# Patient Record
Sex: FEMALE | Race: BLACK OR AFRICAN AMERICAN | NOT HISPANIC OR LATINO | Employment: UNEMPLOYED | URBAN - METROPOLITAN AREA
[De-identification: names, ages, dates, MRNs, and addresses within clinical notes are randomized per-mention and may not be internally consistent; named-entity substitution may affect disease eponyms.]

---
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
## 2020-01-09 ENCOUNTER — ROUTINE PRENATAL (OUTPATIENT)
Dept: OBGYN | Facility: CLINIC | Age: 20
End: 2020-01-09
Payer: MEDICAID

## 2020-01-09 VITALS — DIASTOLIC BLOOD PRESSURE: 90 MMHG | WEIGHT: 154 LBS | SYSTOLIC BLOOD PRESSURE: 128 MMHG | BODY MASS INDEX: 28.17 KG/M2

## 2020-01-09 DIAGNOSIS — O09.93 ENCOUNTER FOR SUPERVISION OF HIGH RISK PREGNANCY IN THIRD TRIMESTER, ANTEPARTUM: Primary | ICD-10-CM

## 2020-01-09 PROCEDURE — 90040 PR PRENATAL FOLLOW UP: CPT | Performed by: NURSE PRACTITIONER

## 2020-01-09 NOTE — PROGRESS NOTES
Pt here today for OB follow up  Reports +FM  WT: 154 lb  BP: 128/90  Pt states no complaints or concerns today  Declines Tdap vaccine  KATIE sheet reprinted and explained today  Good # [phone] or [phone] (pt's mom # Asia Meredith)

## 2020-01-09 NOTE — PROGRESS NOTES
S:  Pt is  at 34w1d for routine OB follow up.  No concerns today. Pt has never gotten growth scan done, will schedule after this visit.  Reports good FM.  Denies VB, LOF, RUCs or vaginal DC.    O:  Please see above vitals.        FHTs: 145        Fundal ht: 32 cm.        S<D        UDS: positive for marijuana    A:  IUP at 34w1d  Patient Active Problem List    Diagnosis Date Noted   • Encounter for supervision of normal pregnancy in second trimester 10/04/2019     Priority: High   • Excessive weight gain during pregnancy in second trimester 2019     Priority: Medium        P:  1.  GBS @ 36 wks.          2.  Continue FKCs.          3.  Questions answered.          4.  Encouraged pt to tour L&D.          5.  Encourage adequate water intake.        6.  F/u 2 wks with Dr. Sofiya Lima, Dr. Weems or Dr. Sahni for next OB visit         7.  Growth scan ordered        8. Declines TDAP

## 2020-01-21 ENCOUNTER — HOSPITAL ENCOUNTER (INPATIENT)
Facility: MEDICAL CENTER | Age: 20
LOS: 5 days | End: 2020-01-26
Attending: OBSTETRICS & GYNECOLOGY | Admitting: OBSTETRICS & GYNECOLOGY
Payer: MEDICAID

## 2020-01-21 DIAGNOSIS — Z98.891 S/P C-SECTION: ICD-10-CM

## 2020-01-21 LAB
ALBUMIN SERPL BCP-MCNC: 3.7 G/DL (ref 3.2–4.9)
ALBUMIN/GLOB SERPL: 1 G/DL
ALP SERPL-CCNC: 105 U/L (ref 30–99)
ALT SERPL-CCNC: 9 U/L (ref 2–50)
ANION GAP SERPL CALC-SCNC: 12 MMOL/L (ref 0–11.9)
APPEARANCE UR: CLEAR
AST SERPL-CCNC: 14 U/L (ref 12–45)
BASOPHILS # BLD AUTO: 0.1 % (ref 0–1.8)
BASOPHILS # BLD: 0.01 K/UL (ref 0–0.12)
BILIRUB SERPL-MCNC: 0.3 MG/DL (ref 0.1–1.5)
BUN SERPL-MCNC: 8 MG/DL (ref 8–22)
CALCIUM SERPL-MCNC: 9 MG/DL (ref 8.5–10.5)
CHLORIDE SERPL-SCNC: 101 MMOL/L (ref 96–112)
CO2 SERPL-SCNC: 22 MMOL/L (ref 20–33)
COLOR UR AUTO: YELLOW
CREAT SERPL-MCNC: 0.87 MG/DL (ref 0.5–1.4)
CREAT UR-MCNC: 101.7 MG/DL
EOSINOPHIL # BLD AUTO: 0.01 K/UL (ref 0–0.51)
EOSINOPHIL NFR BLD: 0.1 % (ref 0–6.9)
ERYTHROCYTE [DISTWIDTH] IN BLOOD BY AUTOMATED COUNT: 41.7 FL (ref 35.9–50)
GLOBULIN SER CALC-MCNC: 3.6 G/DL (ref 1.9–3.5)
GLUCOSE SERPL-MCNC: 86 MG/DL (ref 65–99)
GLUCOSE UR QL STRIP.AUTO: NEGATIVE MG/DL
HCT VFR BLD AUTO: 36.6 % (ref 37–47)
HGB BLD-MCNC: 11.8 G/DL (ref 12–16)
IMM GRANULOCYTES # BLD AUTO: 0.08 K/UL (ref 0–0.11)
IMM GRANULOCYTES NFR BLD AUTO: 1.1 % (ref 0–0.9)
KETONES UR QL STRIP.AUTO: >=160 MG/DL
LEUKOCYTE ESTERASE UR QL STRIP.AUTO: ABNORMAL
LYMPHOCYTES # BLD AUTO: 1.22 K/UL (ref 1–4.8)
LYMPHOCYTES NFR BLD: 17.4 % (ref 22–41)
MAGNESIUM SERPL-MCNC: 5.5 MG/DL (ref 1.5–2.5)
MAGNESIUM SERPL-MCNC: 6.8 MG/DL (ref 1.5–2.5)
MCH RBC QN AUTO: 28 PG (ref 27–33)
MCHC RBC AUTO-ENTMCNC: 32.2 G/DL (ref 33.6–35)
MCV RBC AUTO: 86.9 FL (ref 81.4–97.8)
MONOCYTES # BLD AUTO: 0.45 K/UL (ref 0–0.85)
MONOCYTES NFR BLD AUTO: 6.4 % (ref 0–13.4)
NEUTROPHILS # BLD AUTO: 5.26 K/UL (ref 2–7.15)
NEUTROPHILS NFR BLD: 74.9 % (ref 44–72)
NITRITE UR QL STRIP.AUTO: NEGATIVE
NRBC # BLD AUTO: 0 K/UL
NRBC BLD-RTO: 0 /100 WBC
PH UR STRIP.AUTO: 6.5 [PH] (ref 5–8)
PLATELET # BLD AUTO: 155 K/UL (ref 164–446)
PMV BLD AUTO: 11.2 FL (ref 9–12.9)
POTASSIUM SERPL-SCNC: 3.4 MMOL/L (ref 3.6–5.5)
PROT SERPL-MCNC: 7.3 G/DL (ref 6–8.2)
PROT UR QL STRIP: 30 MG/DL
PROT UR-MCNC: 35 MG/DL (ref 0–15)
PROT/CREAT UR: 344 MG/G (ref 10–107)
RBC # BLD AUTO: 4.21 M/UL (ref 4.2–5.4)
RBC UR QL AUTO: NEGATIVE
SODIUM SERPL-SCNC: 135 MMOL/L (ref 135–145)
SP GR UR: 1.01 (ref 1–1.03)
URATE SERPL-MCNC: 6.6 MG/DL (ref 1.9–8.2)
WBC # BLD AUTO: 7 K/UL (ref 4.8–10.8)

## 2020-01-21 PROCEDURE — 81002 URINALYSIS NONAUTO W/O SCOPE: CPT

## 2020-01-21 PROCEDURE — 700102 HCHG RX REV CODE 250 W/ 637 OVERRIDE(OP): Performed by: OBSTETRICS & GYNECOLOGY

## 2020-01-21 PROCEDURE — 700102 HCHG RX REV CODE 250 W/ 637 OVERRIDE(OP): Performed by: PHYSICIAN ASSISTANT

## 2020-01-21 PROCEDURE — 700105 HCHG RX REV CODE 258: Performed by: OBSTETRICS & GYNECOLOGY

## 2020-01-21 PROCEDURE — 84156 ASSAY OF PROTEIN URINE: CPT

## 2020-01-21 PROCEDURE — 85025 COMPLETE CBC W/AUTO DIFF WBC: CPT

## 2020-01-21 PROCEDURE — A9270 NON-COVERED ITEM OR SERVICE: HCPCS | Performed by: OBSTETRICS & GYNECOLOGY

## 2020-01-21 PROCEDURE — 36415 COLL VENOUS BLD VENIPUNCTURE: CPT

## 2020-01-21 PROCEDURE — 3E0P7VZ INTRODUCTION OF HORMONE INTO FEMALE REPRODUCTIVE, VIA NATURAL OR ARTIFICIAL OPENING: ICD-10-PCS | Performed by: OBSTETRICS & GYNECOLOGY

## 2020-01-21 PROCEDURE — 96372 THER/PROPH/DIAG INJ SC/IM: CPT

## 2020-01-21 PROCEDURE — A9270 NON-COVERED ITEM OR SERVICE: HCPCS | Performed by: PHYSICIAN ASSISTANT

## 2020-01-21 PROCEDURE — 700111 HCHG RX REV CODE 636 W/ 250 OVERRIDE (IP): Performed by: OBSTETRICS & GYNECOLOGY

## 2020-01-21 PROCEDURE — 82570 ASSAY OF URINE CREATININE: CPT

## 2020-01-21 PROCEDURE — 700111 HCHG RX REV CODE 636 W/ 250 OVERRIDE (IP)

## 2020-01-21 PROCEDURE — 770002 HCHG ROOM/CARE - OB PRIVATE (112)

## 2020-01-21 PROCEDURE — 80053 COMPREHEN METABOLIC PANEL: CPT

## 2020-01-21 PROCEDURE — 84550 ASSAY OF BLOOD/URIC ACID: CPT

## 2020-01-21 PROCEDURE — 700101 HCHG RX REV CODE 250: Performed by: OBSTETRICS & GYNECOLOGY

## 2020-01-21 PROCEDURE — 700111 HCHG RX REV CODE 636 W/ 250 OVERRIDE (IP): Performed by: NURSE PRACTITIONER

## 2020-01-21 PROCEDURE — 83735 ASSAY OF MAGNESIUM: CPT

## 2020-01-21 RX ORDER — LABETALOL HYDROCHLORIDE 5 MG/ML
20-80 INJECTION, SOLUTION INTRAVENOUS PRN
Status: DISCONTINUED | OUTPATIENT
Start: 2020-01-21 | End: 2020-01-26

## 2020-01-21 RX ORDER — SODIUM CHLORIDE, SODIUM LACTATE, POTASSIUM CHLORIDE, CALCIUM CHLORIDE 600; 310; 30; 20 MG/100ML; MG/100ML; MG/100ML; MG/100ML
INJECTION, SOLUTION INTRAVENOUS CONTINUOUS
Status: DISCONTINUED | OUTPATIENT
Start: 2020-01-21 | End: 2020-01-26

## 2020-01-21 RX ORDER — MAGNESIUM SULFATE HEPTAHYDRATE 40 MG/ML
2 INJECTION, SOLUTION INTRAVENOUS ONCE
Status: COMPLETED | OUTPATIENT
Start: 2020-01-21 | End: 2020-01-21

## 2020-01-21 RX ORDER — ONDANSETRON 4 MG/1
4 TABLET, ORALLY DISINTEGRATING ORAL ONCE
Status: COMPLETED | OUTPATIENT
Start: 2020-01-21 | End: 2020-01-21

## 2020-01-21 RX ORDER — DEXTROSE, SODIUM CHLORIDE, SODIUM LACTATE, POTASSIUM CHLORIDE, AND CALCIUM CHLORIDE 5; .6; .31; .03; .02 G/100ML; G/100ML; G/100ML; G/100ML; G/100ML
INJECTION, SOLUTION INTRAVENOUS CONTINUOUS
Status: DISCONTINUED | OUTPATIENT
Start: 2020-01-21 | End: 2020-01-24

## 2020-01-21 RX ORDER — HYDRALAZINE HYDROCHLORIDE 20 MG/ML
5-10 INJECTION INTRAMUSCULAR; INTRAVENOUS PRN
Status: DISCONTINUED | OUTPATIENT
Start: 2020-01-21 | End: 2020-01-26

## 2020-01-21 RX ORDER — CALCIUM GLUCONATE 94 MG/ML
1 INJECTION, SOLUTION INTRAVENOUS
Status: DISCONTINUED | OUTPATIENT
Start: 2020-01-21 | End: 2020-01-26

## 2020-01-21 RX ORDER — ACETAMINOPHEN 325 MG/1
650 TABLET ORAL EVERY 6 HOURS PRN
Status: DISCONTINUED | OUTPATIENT
Start: 2020-01-21 | End: 2020-01-23

## 2020-01-21 RX ORDER — MAGNESIUM SULFATE HEPTAHYDRATE 40 MG/ML
INJECTION, SOLUTION INTRAVENOUS
Status: COMPLETED
Start: 2020-01-21 | End: 2020-01-21

## 2020-01-21 RX ORDER — ONDANSETRON 2 MG/ML
4 INJECTION INTRAMUSCULAR; INTRAVENOUS EVERY 6 HOURS PRN
Status: DISCONTINUED | OUTPATIENT
Start: 2020-01-21 | End: 2020-01-26

## 2020-01-21 RX ORDER — MAGNESIUM SULFATE HEPTAHYDRATE 40 MG/ML
4 INJECTION, SOLUTION INTRAVENOUS ONCE
Status: COMPLETED | OUTPATIENT
Start: 2020-01-21 | End: 2020-01-21

## 2020-01-21 RX ORDER — BETAMETHASONE SODIUM PHOSPHATE AND BETAMETHASONE ACETATE 3; 3 MG/ML; MG/ML
12 INJECTION, SUSPENSION INTRA-ARTICULAR; INTRALESIONAL; INTRAMUSCULAR; SOFT TISSUE EVERY 24 HOURS
Status: COMPLETED | OUTPATIENT
Start: 2020-01-21 | End: 2020-01-22

## 2020-01-21 RX ORDER — MAGNESIUM SULFATE HEPTAHYDRATE 40 MG/ML
1.5 INJECTION, SOLUTION INTRAVENOUS CONTINUOUS
Status: DISCONTINUED | OUTPATIENT
Start: 2020-01-21 | End: 2020-01-23

## 2020-01-21 RX ADMIN — ONDANSETRON 4 MG: 4 TABLET, ORALLY DISINTEGRATING ORAL at 10:56

## 2020-01-21 RX ADMIN — DINOPROSTONE 0.5 MG: 0.5 GEL VAGINAL at 14:52

## 2020-01-21 RX ADMIN — MAGNESIUM SULFATE HEPTAHYDRATE 2 G: 40 INJECTION, SOLUTION INTRAVENOUS at 12:08

## 2020-01-21 RX ADMIN — BETAMETHASONE SODIUM PHOSPHATE AND BETAMETHASONE ACETATE 12 MG: 3; 3 INJECTION, SUSPENSION INTRA-ARTICULAR; INTRALESIONAL; INTRAMUSCULAR at 12:45

## 2020-01-21 RX ADMIN — LABETALOL HYDROCHLORIDE 20 MG: 5 INJECTION, SOLUTION INTRAVENOUS at 23:31

## 2020-01-21 RX ADMIN — LABETALOL HYDROCHLORIDE 20 MG: 5 INJECTION, SOLUTION INTRAVENOUS at 11:57

## 2020-01-21 RX ADMIN — ONDANSETRON 4 MG: 2 INJECTION INTRAMUSCULAR; INTRAVENOUS at 18:58

## 2020-01-21 RX ADMIN — SODIUM CHLORIDE 2.5 MILLION UNITS: 9 INJECTION, SOLUTION INTRAVENOUS at 16:00

## 2020-01-21 RX ADMIN — SODIUM CHLORIDE 5 MILLION UNITS: 900 INJECTION INTRAVENOUS at 12:10

## 2020-01-21 RX ADMIN — ACETAMINOPHEN 650 MG: 325 TABLET, FILM COATED ORAL at 18:58

## 2020-01-21 RX ADMIN — MAGNESIUM SULFATE HEPTAHYDRATE 2 G/HR: 40 INJECTION, SOLUTION INTRAVENOUS at 12:43

## 2020-01-21 RX ADMIN — MISOPROSTOL 25 MCG: 100 TABLET ORAL at 21:00

## 2020-01-21 RX ADMIN — SODIUM CHLORIDE, POTASSIUM CHLORIDE, SODIUM LACTATE AND CALCIUM CHLORIDE: 600; 310; 30; 20 INJECTION, SOLUTION INTRAVENOUS at 12:06

## 2020-01-21 RX ADMIN — SODIUM CHLORIDE 2.5 MILLION UNITS: 9 INJECTION, SOLUTION INTRAVENOUS at 19:57

## 2020-01-21 RX ADMIN — MAGNESIUM SULFATE HEPTAHYDRATE 4 G: 40 INJECTION, SOLUTION INTRAVENOUS at 12:18

## 2020-01-21 ASSESSMENT — LIFESTYLE VARIABLES
CONSUMPTION TOTAL: NEGATIVE
ON A TYPICAL DAY WHEN YOU DRINK ALCOHOL HOW MANY DRINKS DO YOU HAVE: 0
AVERAGE NUMBER OF DAYS PER WEEK YOU HAVE A DRINK CONTAINING ALCOHOL: 0
EVER FELT BAD OR GUILTY ABOUT YOUR DRINKING: NO
EVER HAD A DRINK FIRST THING IN THE MORNING TO STEADY YOUR NERVES TO GET RID OF A HANGOVER: NO
HAVE YOU EVER FELT YOU SHOULD CUT DOWN ON YOUR DRINKING: NO
HOW MANY TIMES IN THE PAST YEAR HAVE YOU HAD 5 OR MORE DRINKS IN A DAY: 0
ALCOHOL_USE: NO
EVER_SMOKED: NEVER
TOTAL SCORE: 0
TOTAL SCORE: 0
HAVE PEOPLE ANNOYED YOU BY CRITICIZING YOUR DRINKING: NO
DOES PATIENT WANT TO STOP DRINKING: NO
TOTAL SCORE: 0

## 2020-01-21 ASSESSMENT — COPD QUESTIONNAIRES
COPD SCREENING SCORE: 0
HAVE YOU SMOKED AT LEAST 100 CIGARETTES IN YOUR ENTIRE LIFE: NO/DON'T KNOW
IN THE PAST 12 MONTHS DO YOU DO LESS THAN YOU USED TO BECAUSE OF YOUR BREATHING PROBLEMS: DISAGREE/UNSURE
DO YOU EVER COUGH UP ANY MUCUS OR PHLEGM?: NO/ONLY WITH OCCASIONAL COLDS OR INFECTIONS
DURING THE PAST 4 WEEKS HOW MUCH DID YOU FEEL SHORT OF BREATH: NONE/LITTLE OF THE TIME

## 2020-01-21 ASSESSMENT — PATIENT HEALTH QUESTIONNAIRE - PHQ9
1. LITTLE INTEREST OR PLEASURE IN DOING THINGS: NOT AT ALL
2. FEELING DOWN, DEPRESSED, IRRITABLE, OR HOPELESS: NOT AT ALL
SUM OF ALL RESPONSES TO PHQ9 QUESTIONS 1 AND 2: 0

## 2020-01-21 NOTE — PROGRESS NOTES
1020-pt presents from home with c/o n/v for a few days and round ligament pain, no c/o leaking, bleeding, pain or uc's, states baby is moving normally, placed on external monitors, vs taken and set for q 10 min due to elevation, ua dipped with 1+ protein, pt states that she has had a headache since yesterday, denies visual changes and RUQ pain, reflexes brisk, no clonus no swelling,   1040-TC KARISSA Massey CNM, report given, [facility] labs ordered  1050-labs drawn and sent  1135-TC Dr Weems, pressures elevated again, will be in to see pt  1140-Dr Weems in room, discussed admission for delivery with pt, vtx by US  1150-another elevated pressure, labetalol iv and hypertensive protocol ordered   1157-labetalol given  1200-report given to WILLIAN Seaman RN, POC discussed, transferred to LND for IOL and magnesium start

## 2020-01-21 NOTE — PROGRESS NOTES
Pt examined for prostaglandin gel    SSE: closed cervix. Prostaglandin gel deployed without difficulty.   SVE deferred as nurse's previous check was closed and thick  Bps are 140s/ 80-90s on Mg drip    FHT: 145 / mod sandi / reactive  John Sevier: q 5min    Plan  Recheck in 6hrs.

## 2020-01-21 NOTE — H&P
History and Physical    Mare Eason is a 19 y.o. female  at 35w6d who presents for nausea and vomiting. She has been having nausea for her entire third trimester and has been taking zofran. She ran out and her appt is not until tomorrow. She vomited 2 times today. She also has been having suprapubic pain and round ligament discomfort.   Today she admits to a headache.   First BP on admission was 173/105, 170/94, 140/94, 174/107, and 173/103. Patient denies hx elevated Bps.     Subjective:   CTXs: negative   Pain: negative  LOF: negative  Vaginal bleeding: negative   Fetal movement: positive    ROS: Pertinent positives documented in HPI and all other systems reviewed & are negative    OB History    Para Term  AB Living   1             SAB TAB Ectopic Molar Multiple Live Births                    # Outcome Date GA Lbr Adán/2nd Weight Sex Delivery Anes PTL Lv   1 Current                Past Medical History:   Diagnosis Date   • Head ache    • Physiological ovarian cysts 2015       No past surgical history on file.      Current Facility-Administered Medications:   •  betamethasone acetate-betamethasone sodium phosphate (CELESTONE) injection 12 mg, 12 mg, Intramuscular, Q24HR, Nabeel Dank, D.O.  •  labetalol (NORMODYNE/TRANDATE) injection 20-80 mg, 20-80 mg, Intravenous, PRN **OR** hydrALAZINE (APRESOLINE) injection 5-10 mg, 5-10 mg, Intravenous, PRN, Nabeel Dank, D.O.  •  lactated ringers infusion, , Intravenous, Continuous, Nabeel Lirianost, D.O.  •  ondansetron (ZOFRAN) syringe/vial injection 4 mg, 4 mg, Intravenous, Q6HRS PRN, Nabeel Dank, D.O.  •  magnesium sulfate IVPB premix 4 g, 4 g, Intravenous, Once, Nabeel Dank, D.O.  •  magnesium sulfate IVPB premix 2 g, 2 g, Intravenous, Once, Nabeel Lirianost, D.O.  •  magnesium sulfate 40 g/1000mL infusion, 2 g/hr, Intravenous, Continuous, Nabeel Dank, D.O.  •  calcium GLUConate injection 1 g, 1 g, Intravenous, Once PRN, Nabeel Lirianost,  D.O.  •  penicillin G potassium 5 Million Units in  mL IVPB, 5 Million Units, Intravenous, Once **FOLLOWED BY** penicillin G potassium 2.5 Million Units in  mL IVPB, 2.5 Million Units, Intravenous, Q4HRS, Nabeel Weems D.O.    Allergies: Patient has no known allergies.    Social History     Socioeconomic History   • Marital status: Single     Spouse name: Not on file   • Number of children: Not on file   • Years of education: Not on file   • Highest education level: Not on file   Occupational History   • Not on file   Social Needs   • Financial resource strain: Not on file   • Food insecurity:     Worry: Not on file     Inability: Not on file   • Transportation needs:     Medical: Not on file     Non-medical: Not on file   Tobacco Use   • Smoking status: Never Smoker   • Smokeless tobacco: Never Used   Substance and Sexual Activity   • Alcohol use: No   • Drug use: Yes     Types: Marijuana   • Sexual activity: Not Currently     Partners: Male     Comment: None    Lifestyle   • Physical activity:     Days per week: Not on file     Minutes per session: Not on file   • Stress: Not on file   Relationships   • Social connections:     Talks on phone: Not on file     Gets together: Not on file     Attends Church service: Not on file     Active member of club or organization: Not on file     Attends meetings of clubs or organizations: Not on file     Relationship status: Not on file   • Intimate partner violence:     Fear of current or ex partner: Not on file     Emotionally abused: Not on file     Physically abused: Not on file     Forced sexual activity: Not on file   Other Topics Concern   • Not on file   Social History Narrative   • Not on file     FamHx:   Congenital anomalies denies  Chromosomal disorders denies  Inherited MR denies  Blood dyscrasias denies    Prenatal care with [facility] with following problems:  Patient Active Problem List    Diagnosis Date Noted   • Encounter for supervision of normal  "pregnancy in second trimester 10/04/2019     Priority: High   • Excessive weight gain during pregnancy in second trimester 11/14/2019     Priority: Medium     Objective:      BP (!) 174/107   Pulse 93   Temp 36.8 °C (98.3 °F) (Temporal)   Resp 16   Ht 1.575 m (5' 2.01\")   Wt 69.9 kg (154 lb)   SpO2 98%     General:   no acute distress, alert   Skin:   normal   HEENT:  PERRLA   Lungs:   CTA bilateral   Heart:   chest is clear without rales or wheezing, no pedal edema   Abdomen:   soft, gravid, NT   EFW:  2800gr   Pelvis:  adequate with gynecoid pelvis   FHT:  150 BPM  Accels yes  Decels no  Variability moderate  Category I   Uterine Size: S=D   Presentations: Cephalic on BS us today   Cervix:  deferred to LD     Lab Review  Lab:   Blood type: O+     Recent Results (from the past 5880 hour(s))   POCT Pregnancy    Collection Time: 06/28/19  1:56 PM   Result Value Ref Range    POC Urine Pregnancy Test Positive Negative    Internal Control Positive Positive     Internal Control Negative Negative    POCT Urinalysis    Collection Time: 10/04/19  2:40 PM   Result Value Ref Range    POC Color      POC Appearance      POC Leukocyte Esterase small Negative    POC Nitrites negative Negative    POC Urobiligen      POC Protein negative Negative mg/dL    POC Urine PH 5.5 5.0 - 8.0    POC Blood negative Negative    POC Specific Gravity <=1.005 <1.005 - >1.030    POC Ketones negative Negative mg/dL    POC Bilirubin      POC Glucose negative Negative mg/dL   CBC WITH DIFFERENTIAL    Collection Time: 11/26/19 11:40 AM   Result Value Ref Range    WBC 9.1 4.8 - 10.8 K/uL    RBC 3.92 (L) 4.20 - 5.40 M/uL    Hemoglobin 11.4 (L) 12.0 - 16.0 g/dL    Hematocrit 35.0 (L) 37.0 - 47.0 %    MCV 89.3 81.4 - 97.8 fL    MCH 29.1 27.0 - 33.0 pg    MCHC 32.6 (L) 33.6 - 35.0 g/dL    RDW 41.6 35.9 - 50.0 fL    Platelet Count 160 (L) 164 - 446 K/uL    MPV 11.1 9.0 - 12.9 fL    Neutrophils-Polys 77.70 (H) 44.00 - 72.00 %    Lymphocytes 14.40 (L) " 22.00 - 41.00 %    Monocytes 4.90 0.00 - 13.40 %    Eosinophils 0.20 0.00 - 6.90 %    Basophils 0.30 0.00 - 1.80 %    Immature Granulocytes 2.50 (H) 0.00 - 0.90 %    Nucleated RBC 0.00 /100 WBC    Neutrophils (Absolute) 7.03 2.00 - 7.15 K/uL    Lymphs (Absolute) 1.30 1.00 - 4.80 K/uL    Monos (Absolute) 0.44 0.00 - 0.85 K/uL    Eos (Absolute) 0.02 0.00 - 0.51 K/uL    Baso (Absolute) 0.03 0.00 - 0.12 K/uL    Immature Granulocytes (abs) 0.23 (H) 0.00 - 0.11 K/uL    NRBC (Absolute) 0.00 K/uL   Basic Metabolic Panel    Collection Time: 11/26/19 11:40 AM   Result Value Ref Range    Sodium 137 135 - 145 mmol/L    Potassium 3.7 3.6 - 5.5 mmol/L    Chloride 104 96 - 112 mmol/L    Co2 24 20 - 33 mmol/L    Glucose 96 65 - 99 mg/dL    Bun 11 8 - 22 mg/dL    Creatinine 0.76 0.50 - 1.40 mg/dL    Calcium 9.1 8.5 - 10.5 mg/dL    Anion Gap 9.0 0.0 - 11.9   ESTIMATED GFR    Collection Time: 11/26/19 11:40 AM   Result Value Ref Range    GFR If African American >60 >60 mL/min/1.73 m 2    GFR If Non African American >60 >60 mL/min/1.73 m 2   PERIPHERAL SMEAR REVIEW    Collection Time: 11/26/19 11:40 AM   Result Value Ref Range    Peripheral Smear Review see below    URINALYSIS,CULTURE IF INDICATED    Collection Time: 11/26/19 12:46 PM   Result Value Ref Range    Color Yellow     Character Clear     Specific Gravity 1.011 <1.035    Ph 7.0 5.0 - 8.0    Glucose 500 (A) Negative mg/dL    Ketones Negative Negative mg/dL    Protein Negative Negative mg/dL    Bilirubin Negative Negative    Urobilinogen, Urine 0.2 Negative    Nitrite Negative Negative    Leukocyte Esterase Negative Negative    Occult Blood Negative Negative    Micro Urine Req see below    COMP METABOLIC PANEL    Collection Time: 12/15/19  7:45 AM   Result Value Ref Range    Sodium 136 135 - 145 mmol/L    Potassium 3.2 (L) 3.6 - 5.5 mmol/L    Chloride 107 96 - 112 mmol/L    Co2 21 20 - 33 mmol/L    Anion Gap 8.0 0.0 - 11.9    Glucose 104 (H) 65 - 99 mg/dL    Bun 6 (L) 8 - 22  mg/dL    Creatinine 0.80 0.50 - 1.40 mg/dL    Calcium 9.0 8.5 - 10.5 mg/dL    AST(SGOT) 19 12 - 45 U/L    ALT(SGPT) 15 2 - 50 U/L    Alkaline Phosphatase 79 30 - 99 U/L    Total Bilirubin 0.2 0.1 - 1.5 mg/dL    Albumin 3.7 3.2 - 4.9 g/dL    Total Protein 7.1 6.0 - 8.2 g/dL    Globulin 3.4 1.9 - 3.5 g/dL    A-G Ratio 1.1 g/dL   CBC WITH DIFFERENTIAL    Collection Time: 12/15/19  7:45 AM   Result Value Ref Range    WBC 5.3 4.8 - 10.8 K/uL    RBC 3.96 (L) 4.20 - 5.40 M/uL    Hemoglobin 11.3 (L) 12.0 - 16.0 g/dL    Hematocrit 34.5 (L) 37.0 - 47.0 %    MCV 87.1 81.4 - 97.8 fL    MCH 28.5 27.0 - 33.0 pg    MCHC 32.8 (L) 33.6 - 35.0 g/dL    RDW 40.8 35.9 - 50.0 fL    Platelet Count 144 (L) 164 - 446 K/uL    MPV 10.7 9.0 - 12.9 fL    Neutrophils-Polys 74.90 (H) 44.00 - 72.00 %    Lymphocytes 14.80 (L) 22.00 - 41.00 %    Monocytes 7.80 0.00 - 13.40 %    Eosinophils 0.40 0.00 - 6.90 %    Basophils 0.20 0.00 - 1.80 %    Immature Granulocytes 1.90 (H) 0.00 - 0.90 %    Nucleated RBC 0.00 /100 WBC    Neutrophils (Absolute) 3.95 2.00 - 7.15 K/uL    Lymphs (Absolute) 0.78 (L) 1.00 - 4.80 K/uL    Monos (Absolute) 0.41 0.00 - 0.85 K/uL    Eos (Absolute) 0.02 0.00 - 0.51 K/uL    Baso (Absolute) 0.01 0.00 - 0.12 K/uL    Immature Granulocytes (abs) 0.10 0.00 - 0.11 K/uL    NRBC (Absolute) 0.00 K/uL   ESTIMATED GFR    Collection Time: 12/15/19  7:45 AM   Result Value Ref Range    GFR If African American >60 >60 mL/min/1.73 m 2    GFR If Non African American >60 >60 mL/min/1.73 m 2   URINALYSIS CULTURE, IF INDICATED    Collection Time: 12/15/19  8:30 AM   Result Value Ref Range    Color Yellow     Character Clear     Specific Gravity 1.007 <1.035    Ph 7.0 5.0 - 8.0    Glucose Negative Negative mg/dL    Ketones Negative Negative mg/dL    Protein Negative Negative mg/dL    Bilirubin Negative Negative    Urobilinogen, Urine 0.2 Negative    Nitrite Negative Negative    Leukocyte Esterase Negative Negative    Occult Blood Negative Negative     Micro Urine Req see below    PROTEIN/CREAT RATIO URINE    Collection Time: 01/21/20 10:35 AM   Result Value Ref Range    Total Protein, Urine 35.0 (H) 0.0 - 15.0 mg/dL    Creatinine, Random Urine 101.70 mg/dL    Protein Creatinine Ratio 344 (H) 10 - 107 mg/g   POC UA    Collection Time: 01/21/20 10:45 AM   Result Value Ref Range    POC Color Yellow     POC Appearance Clear     POC Glucose Negative Negative mg/dL    POC Ketones >=160 (A) Negative mg/dL    POC Specific Gravity 1.015 1.005 - 1.030    POC Blood Negative Negative    POC Urine PH 6.5 5.0 - 8.0    POC Protein 30 (A) Negative mg/dL    POC Nitrites Negative Negative    POC Leukocyte Esterase Trace (A) Negative   CBC WITH DIFFERENTIAL    Collection Time: 01/21/20 10:50 AM   Result Value Ref Range    WBC 7.0 4.8 - 10.8 K/uL    RBC 4.21 4.20 - 5.40 M/uL    Hemoglobin 11.8 (L) 12.0 - 16.0 g/dL    Hematocrit 36.6 (L) 37.0 - 47.0 %    MCV 86.9 81.4 - 97.8 fL    MCH 28.0 27.0 - 33.0 pg    MCHC 32.2 (L) 33.6 - 35.0 g/dL    RDW 41.7 35.9 - 50.0 fL    Platelet Count 155 (L) 164 - 446 K/uL    MPV 11.2 9.0 - 12.9 fL    Neutrophils-Polys 74.90 (H) 44.00 - 72.00 %    Lymphocytes 17.40 (L) 22.00 - 41.00 %    Monocytes 6.40 0.00 - 13.40 %    Eosinophils 0.10 0.00 - 6.90 %    Basophils 0.10 0.00 - 1.80 %    Immature Granulocytes 1.10 (H) 0.00 - 0.90 %    Nucleated RBC 0.00 /100 WBC    Neutrophils (Absolute) 5.26 2.00 - 7.15 K/uL    Lymphs (Absolute) 1.22 1.00 - 4.80 K/uL    Monos (Absolute) 0.45 0.00 - 0.85 K/uL    Eos (Absolute) 0.01 0.00 - 0.51 K/uL    Baso (Absolute) 0.01 0.00 - 0.12 K/uL    Immature Granulocytes (abs) 0.08 0.00 - 0.11 K/uL    NRBC (Absolute) 0.00 K/uL   Comp Metabolic Panel    Collection Time: 01/21/20 10:50 AM   Result Value Ref Range    Sodium 135 135 - 145 mmol/L    Potassium 3.4 (L) 3.6 - 5.5 mmol/L    Chloride 101 96 - 112 mmol/L    Co2 22 20 - 33 mmol/L    Anion Gap 12.0 (H) 0.0 - 11.9    Glucose 86 65 - 99 mg/dL    Bun 8 8 - 22 mg/dL    Creatinine  0.87 0.50 - 1.40 mg/dL    Calcium 9.0 8.5 - 10.5 mg/dL    AST(SGOT) 14 12 - 45 U/L    ALT(SGPT) 9 2 - 50 U/L    Alkaline Phosphatase 105 (H) 30 - 99 U/L    Total Bilirubin 0.3 0.1 - 1.5 mg/dL    Albumin 3.7 3.2 - 4.9 g/dL    Total Protein 7.3 6.0 - 8.2 g/dL    Globulin 3.6 (H) 1.9 - 3.5 g/dL    A-G Ratio 1.0 g/dL   URIC ACID    Collection Time: 01/21/20 10:50 AM   Result Value Ref Range    Uric Acid 6.6 1.9 - 8.2 mg/dL   ESTIMATED GFR    Collection Time: 01/21/20 10:50 AM   Result Value Ref Range    GFR If African American >60 >60 mL/min/1.73 m 2    GFR If Non African American >60 >60 mL/min/1.73 m 2        Assessment:   Mare Eason at 35w6d with PEC with SF by BP criteria  Obstetrical history significant for   Patient Active Problem List    Diagnosis Date Noted   • Encounter for supervision of normal pregnancy in second trimester 10/04/2019     Priority: High   • Excessive weight gain during pregnancy in second trimester 11/14/2019     Priority: Medium   .      Plan:     Admit to L&D  GBS unknown  Fetal monitoring/toco  Plan for induction of labor for PEC with SF  Mg drip 6 gram bolus followed by 2gr/hr drip  Labetalol 20mg stat IV for severe range BP; hypertensive protocol ordered  SVE PRN for induction  PCN for GBS unknown  Betamethasone 12mg IM stat and repeat in 24 hours if still pregnant

## 2020-01-21 NOTE — PROGRESS NOTES
1215- Report received from GISELA Jaimes. Mag bolus running. PCN piggy backed running at 200 ml/hr with LR. POC discussed and assumed. Ice cold cloths given for comfort.   1350- Orders from Dr Weems to complete SVE. BP stable at 136/89. Orders to allow pt to drink water and/or juice.   1400- SVE closed/ thick/ high. Pt had a hard time with SVE but tolerated after some couching from RN.   1410- Dr Weems made aware of check. Order for gel IOL.   1452- Dr Weems at bedside to place gel via speculum. Pt put up in stirups and tolerated placement well. Pt educated about importance of remaining head down for the next hr. Pt verbalized understanding.   1848- Pt c/o painful HA rated at 7/10, pt denies visual disturbances. /83. Dr Weems called and made aware, orders to give 650 mg PO tylenol.   1900- Report given to GISELA Koch.

## 2020-01-22 ENCOUNTER — ANESTHESIA (OUTPATIENT)
Dept: OBGYN | Facility: MEDICAL CENTER | Age: 20
End: 2020-01-22
Payer: MEDICAID

## 2020-01-22 ENCOUNTER — ANESTHESIA EVENT (OUTPATIENT)
Dept: OBGYN | Facility: MEDICAL CENTER | Age: 20
End: 2020-01-22
Payer: MEDICAID

## 2020-01-22 LAB — MAGNESIUM SERPL-MCNC: 7 MG/DL (ref 1.5–2.5)

## 2020-01-22 PROCEDURE — 700105 HCHG RX REV CODE 258: Performed by: OBSTETRICS & GYNECOLOGY

## 2020-01-22 PROCEDURE — 700102 HCHG RX REV CODE 250 W/ 637 OVERRIDE(OP): Performed by: OBSTETRICS & GYNECOLOGY

## 2020-01-22 PROCEDURE — 700101 HCHG RX REV CODE 250: Performed by: ADVANCED PRACTICE MIDWIFE

## 2020-01-22 PROCEDURE — 700102 HCHG RX REV CODE 250 W/ 637 OVERRIDE(OP): Performed by: PHYSICIAN ASSISTANT

## 2020-01-22 PROCEDURE — 770002 HCHG ROOM/CARE - OB PRIVATE (112)

## 2020-01-22 PROCEDURE — 700111 HCHG RX REV CODE 636 W/ 250 OVERRIDE (IP): Performed by: ADVANCED PRACTICE MIDWIFE

## 2020-01-22 PROCEDURE — 83735 ASSAY OF MAGNESIUM: CPT

## 2020-01-22 PROCEDURE — A9270 NON-COVERED ITEM OR SERVICE: HCPCS | Performed by: PHYSICIAN ASSISTANT

## 2020-01-22 PROCEDURE — 700105 HCHG RX REV CODE 258: Performed by: ANESTHESIOLOGY

## 2020-01-22 PROCEDURE — 10H07YZ INSERTION OF OTHER DEVICE INTO PRODUCTS OF CONCEPTION, VIA NATURAL OR ARTIFICIAL OPENING: ICD-10-PCS | Performed by: OBSTETRICS & GYNECOLOGY

## 2020-01-22 PROCEDURE — 700111 HCHG RX REV CODE 636 W/ 250 OVERRIDE (IP)

## 2020-01-22 PROCEDURE — 59025 FETAL NON-STRESS TEST: CPT | Mod: 26 | Performed by: OBSTETRICS & GYNECOLOGY

## 2020-01-22 PROCEDURE — 700111 HCHG RX REV CODE 636 W/ 250 OVERRIDE (IP): Performed by: OBSTETRICS & GYNECOLOGY

## 2020-01-22 PROCEDURE — 10907ZC DRAINAGE OF AMNIOTIC FLUID, THERAPEUTIC FROM PRODUCTS OF CONCEPTION, VIA NATURAL OR ARTIFICIAL OPENING: ICD-10-PCS | Performed by: OBSTETRICS & GYNECOLOGY

## 2020-01-22 PROCEDURE — A9270 NON-COVERED ITEM OR SERVICE: HCPCS | Performed by: OBSTETRICS & GYNECOLOGY

## 2020-01-22 PROCEDURE — 700105 HCHG RX REV CODE 258

## 2020-01-22 PROCEDURE — 302128 INFUSION PUMP: Performed by: ADVANCED PRACTICE MIDWIFE

## 2020-01-22 PROCEDURE — 36415 COLL VENOUS BLD VENIPUNCTURE: CPT

## 2020-01-22 PROCEDURE — 3E033VJ INTRODUCTION OF OTHER HORMONE INTO PERIPHERAL VEIN, PERCUTANEOUS APPROACH: ICD-10-PCS | Performed by: OBSTETRICS & GYNECOLOGY

## 2020-01-22 RX ORDER — SODIUM CHLORIDE, SODIUM LACTATE, POTASSIUM CHLORIDE, AND CALCIUM CHLORIDE .6; .31; .03; .02 G/100ML; G/100ML; G/100ML; G/100ML
250 INJECTION, SOLUTION INTRAVENOUS PRN
Status: DISCONTINUED | OUTPATIENT
Start: 2020-01-22 | End: 2020-01-24 | Stop reason: HOSPADM

## 2020-01-22 RX ORDER — LIDOCAINE HYDROCHLORIDE 20 MG/ML
JELLY TOPICAL
Status: COMPLETED | OUTPATIENT
Start: 2020-01-22 | End: 2020-01-22

## 2020-01-22 RX ORDER — SODIUM CHLORIDE, SODIUM LACTATE, POTASSIUM CHLORIDE, AND CALCIUM CHLORIDE .6; .31; .03; .02 G/100ML; G/100ML; G/100ML; G/100ML
1000 INJECTION, SOLUTION INTRAVENOUS
Status: COMPLETED | OUTPATIENT
Start: 2020-01-22 | End: 2020-01-22

## 2020-01-22 RX ORDER — ROPIVACAINE HYDROCHLORIDE 2 MG/ML
INJECTION, SOLUTION EPIDURAL; INFILTRATION; PERINEURAL
Status: COMPLETED
Start: 2020-01-22 | End: 2020-01-22

## 2020-01-22 RX ORDER — SODIUM CHLORIDE, SODIUM LACTATE, POTASSIUM CHLORIDE, CALCIUM CHLORIDE 600; 310; 30; 20 MG/100ML; MG/100ML; MG/100ML; MG/100ML
INJECTION, SOLUTION INTRAVENOUS
Status: COMPLETED
Start: 2020-01-22 | End: 2020-01-22

## 2020-01-22 RX ORDER — ROPIVACAINE HYDROCHLORIDE 2 MG/ML
INJECTION, SOLUTION EPIDURAL; INFILTRATION; PERINEURAL CONTINUOUS
Status: DISCONTINUED | OUTPATIENT
Start: 2020-01-22 | End: 2020-01-24

## 2020-01-22 RX ADMIN — SODIUM CHLORIDE 2.5 MILLION UNITS: 9 INJECTION, SOLUTION INTRAVENOUS at 09:36

## 2020-01-22 RX ADMIN — FENTANYL CITRATE 100 MCG: 0.05 INJECTION, SOLUTION INTRAMUSCULAR; INTRAVENOUS at 09:48

## 2020-01-22 RX ADMIN — MAGNESIUM SULFATE HEPTAHYDRATE 2 G/HR: 40 INJECTION, SOLUTION INTRAVENOUS at 07:44

## 2020-01-22 RX ADMIN — SODIUM CHLORIDE, SODIUM GLUCONATE, SODIUM ACETATE, POTASSIUM CHLORIDE AND MAGNESIUM CHLORIDE: 526; 502; 368; 37; 30 INJECTION, SOLUTION INTRAVENOUS at 19:21

## 2020-01-22 RX ADMIN — ACETAMINOPHEN 650 MG: 325 TABLET, FILM COATED ORAL at 02:23

## 2020-01-22 RX ADMIN — SODIUM CHLORIDE 2.5 MILLION UNITS: 9 INJECTION, SOLUTION INTRAVENOUS at 04:01

## 2020-01-22 RX ADMIN — ONDANSETRON 4 MG: 2 INJECTION INTRAMUSCULAR; INTRAVENOUS at 02:23

## 2020-01-22 RX ADMIN — BETAMETHASONE SODIUM PHOSPHATE AND BETAMETHASONE ACETATE 12 MG: 3; 3 INJECTION, SUSPENSION INTRA-ARTICULAR; INTRALESIONAL; INTRAMUSCULAR at 13:25

## 2020-01-22 RX ADMIN — SODIUM CHLORIDE, SODIUM LACTATE, POTASSIUM CHLORIDE, CALCIUM CHLORIDE AND DEXTROSE MONOHYDRATE: 5; 600; 310; 30; 20 INJECTION, SOLUTION INTRAVENOUS at 18:17

## 2020-01-22 RX ADMIN — MISOPROSTOL 25 MCG: 100 TABLET ORAL at 04:18

## 2020-01-22 RX ADMIN — SODIUM CHLORIDE, POTASSIUM CHLORIDE, SODIUM LACTATE AND CALCIUM CHLORIDE 1000 ML: 600; 310; 30; 20 INJECTION, SOLUTION INTRAVENOUS at 14:10

## 2020-01-22 RX ADMIN — SODIUM CHLORIDE, POTASSIUM CHLORIDE, SODIUM LACTATE AND CALCIUM CHLORIDE: 600; 310; 30; 20 INJECTION, SOLUTION INTRAVENOUS at 01:04

## 2020-01-22 RX ADMIN — ROPIVACAINE HYDROCHLORIDE 200 MG: 2 INJECTION, SOLUTION EPIDURAL; INFILTRATION at 14:54

## 2020-01-22 RX ADMIN — Medication 2 MILLI-UNITS/MIN: at 11:00

## 2020-01-22 RX ADMIN — ONDANSETRON 4 MG: 2 INJECTION INTRAMUSCULAR; INTRAVENOUS at 14:03

## 2020-01-22 RX ADMIN — SODIUM CHLORIDE 2.5 MILLION UNITS: 9 INJECTION, SOLUTION INTRAVENOUS at 12:27

## 2020-01-22 RX ADMIN — SODIUM CHLORIDE 2.5 MILLION UNITS: 9 INJECTION, SOLUTION INTRAVENOUS at 00:02

## 2020-01-22 RX ADMIN — SODIUM CHLORIDE 2.5 MILLION UNITS: 9 INJECTION, SOLUTION INTRAVENOUS at 22:16

## 2020-01-22 RX ADMIN — SODIUM CHLORIDE, SODIUM GLUCONATE, SODIUM ACETATE, POTASSIUM CHLORIDE AND MAGNESIUM CHLORIDE: 526; 502; 368; 37; 30 INJECTION, SOLUTION INTRAVENOUS at 14:30

## 2020-01-22 RX ADMIN — SODIUM CHLORIDE 2.5 MILLION UNITS: 9 INJECTION, SOLUTION INTRAVENOUS at 16:26

## 2020-01-22 RX ADMIN — ROPIVACAINE HYDROCHLORIDE 200 MG: 2 INJECTION, SOLUTION EPIDURAL; INFILTRATION; PERINEURAL at 14:54

## 2020-01-22 RX ADMIN — FENTANYL CITRATE 100 MCG: 0.05 INJECTION, SOLUTION INTRAMUSCULAR; INTRAVENOUS at 12:28

## 2020-01-22 RX ADMIN — FENTANYL CITRATE 100 MCG: 0.05 INJECTION, SOLUTION INTRAMUSCULAR; INTRAVENOUS at 14:02

## 2020-01-22 RX ADMIN — ONDANSETRON 4 MG: 2 INJECTION INTRAMUSCULAR; INTRAVENOUS at 20:33

## 2020-01-22 RX ADMIN — LIDOCAINE HYDROCHLORIDE 1 APPLICATION: 20 JELLY TOPICAL at 09:32

## 2020-01-22 RX ADMIN — SODIUM CHLORIDE, SODIUM LACTATE, POTASSIUM CHLORIDE, AND CALCIUM CHLORIDE 1000 ML: .6; .31; .03; .02 INJECTION, SOLUTION INTRAVENOUS at 14:10

## 2020-01-22 ASSESSMENT — PATIENT HEALTH QUESTIONNAIRE - PHQ9
2. FEELING DOWN, DEPRESSED, IRRITABLE, OR HOPELESS: NOT AT ALL
SUM OF ALL RESPONSES TO PHQ9 QUESTIONS 1 AND 2: 0
1. LITTLE INTEREST OR PLEASURE IN DOING THINGS: NOT AT ALL

## 2020-01-22 NOTE — PROGRESS NOTES
Pt resting comfortably, states she is feeling some UCs, but is also sleeping through them. D/w pt plan to replace another Cytotec as pt is still closed per RN.    NST: Cat 1  TOCO: UCs q 6-8 min  Mag Sulfate continued  BPs: 110s-130/50-70s  Cytotec placed in post fornix    A/P: IUP at 36w0d - Labetalol given 2330 otherwise BPs stable. Continue Mag, PCN for GBS unknown, pain control prn, will place Cooks catheter once 1cm dilated, anticipate .

## 2020-01-22 NOTE — PROGRESS NOTES
EDC - 20 EGA - 36.0    0700 - Report received from BREANA Lorenzo RN. POC discussed, care assumed.   0744 -  Full Assessment complete. VSS. No complaints of regular, painful contractions, ROM, or vaginal bleeding at this time. Pt reports good FM. Pt denies HA/visual disturbances, epigastric pain. DTR's 2+, no clonus. POC discussed with pt and family members, all questions answered.   0800 - ANTON Marcos CNM updated on pt. Orders received for lidcaine jelly for balloon placement for patient comfort. CNM to come to bedside when time to recheck patient. POC discussed with pt and family.   0825 - severe BP noted. BP cuff re-adjusted. BP WNL. ANTON Marcos CNM notified. Will continue to monitor.  930 - ANTON Marcos CNM at bedside. Update and POC discussed with pt and FOB. Lidocaine jelly place cervically  By ANTON Marcos CNM. SVE=1cm/70/-1. Cooks catheter balloon placed by ANTON Marcos CNM. Pt given 100mcg fentanyl IV slow push over 2 min-see MAR for discomfort during balloon placement. 60ml/uterine and 40ml/vaginal side. Orders received to start pitocin at 1100.   1000 - Lab called with critical result of Magnesium at 7.0. Critical lab result read back to .   This critical lab result is within parameters established by Dr. Taylor for this patient. ANTON Marcos CNM also notified of lab result. Orders received to decrease magnesium infusion to 1.5g/hr-See MAR  1228 - pt medicated for contraction pain 7/10 -see MAR  1325 - 2nd dose betamethasone given IM in right vastus-see MAR  1402 - pt medicated for pain 7/10 and nausea-see MAR. Pt requesting epidural. Consent signed.   1438 - Dr. Henry at bedside. Time out called at 1440. Epidural placed at this time by Dr Henry. Test dose at 1450 - No reaction detected - VSS. Dermatome level of T8. Epidural infusion settings are : 8mL/hr continuous infusion, 6mL bolus every 10 minutes with a 30mL/hr dose limit.   1626 - checked balloon for placement. Pt still 1cm. Balloon  still in place. Dr. Taylor and ANTON Marcos CNM updated.  1720 - pt repositioned to left side. EFM and toco adjusted.  1834 - [place] adjusted  1900 - Report given to JEAN-CLAUDE Reese RN. POC discussed.

## 2020-01-22 NOTE — PROGRESS NOTES
S: Pt is laying in bed with partner upon entry to room. Denies pain at current. Reports contractions as pressure but not painful. Discussed graves bulb vs. Cook catheter with patient. She reports understanding.       O:    Vitals:    20 0744 20 0804 20 0826 20 0931   BP:  (!) 164/101 141/100 126/74   Pulse:  83 78 83   Resp: 18      Temp: 36.7 °C (98 °F)      TempSrc: Temporal      SpO2:       Weight:       Height:               FHTs:  Baseline 140, pos accels, no decels, moderatd variability        [place]: Contractions q 3-5 minutes, moderate to palpation        SVE: FT/70/-2 upon initial exam. Cook catheter attempted with use of stylet x1 and unable to place due to patient intolerance. Fentanyl provided to patient for pain relief. Second attempt at Cook catheter was successful and 60ml/40ml placed. Of note, cervix is midposition but not easily manipulated and resistant to digital change.   Resulting SVE: /70/-2     A/P:    1.  IUP @ 36w0d   2.  Cat I FHTs    3.  Preeclampsia with severe features- continue BP monitoring. Magnesium and labetolol per protocol.     4.  Latent labor- discussed initiation of pitocin. Education provided on all interventions with family. May have clear fluids at this time if tolerated.  Anticipate     MJ Obrien, WILLIE

## 2020-01-22 NOTE — ANESTHESIA PREPROCEDURE EVALUATION
Relevant Problems   No relevant active problems       Physical Exam    Airway   Mallampati: II  TM distance: >3 FB  Neck ROM: full       Cardiovascular - normal exam  Rhythm: regular  Rate: normal     Dental - normal exam         Pulmonary   Breath sounds clear to auscultation     Abdominal - normal exam     Neurological - normal exam                 Anesthesia Plan    ASA 3   ASA physical status 3 criteria: hypertension - poorly controlled    Plan - epidural           Plan Factors:   Patient was not previously instructed to abstain from smoking on day of procedure.  Patient did not smoke on day of procedure.                Informed Consent:    Anesthetic plan and risks discussed with patient.    Use of blood products discussed with: patient whom consented to blood products.

## 2020-01-22 NOTE — PROGRESS NOTES
19 y.o.  EDC  (35.6 wks)   1st betamethasone given at 1245   Magnesium running at 2g/hr    1900-report received from KARISSA Wood RN. Tylenol and zofran given, see MAR. She's starting to feel occasional UCs, but is otherwise comfortable. FOB at bedside.   -SVE=closed/thick and firm. Pt not feeling any UCs anymore. RAUL GUTIERREZ updated. Order received to place cytotec and reevaluate in 4-6 hrs.   0100-SVE=unchanged. Pt adolfo too much for RN to place cytotec. Will reevaluate in 2 hours.   0300-RAUL GUTIERREZ updated on pt's UC pattern. PA to place cytotec.   0700-report given to MICHELLE Juarez RN

## 2020-01-22 NOTE — ANESTHESIA PROCEDURE NOTES
Epidural Block  Date/Time: 1/22/2020 2:35 PM  Performed by: Jose Henry M.D.  Authorized by: Jose Henry M.D.     Patient Location:  OB  Start Time:  1/22/2020 2:35 PM  End Time:  1/22/2020 2:44 PM  Reason for Block: labor analgesia    patient identified, IV checked, site marked, risks and benefits discussed, surgical consent, monitors and equipment checked, pre-op evaluation and timeout performed    Patient Position:  Sitting  Prep: ChloraPrep    Monitoring:  Blood pressure and continuous pulse oximetry  Approach:  Midline  Location:  L3-L4  Injection Technique:  MARK air  Strength:  1%  Dose:  3ml  Needle Type:  Tuohy  Needle Gauge:  17 G  Needle Length:  3.5 in  Loss of resistance::  5  Catheter at Skin Depth:  15  Test Dose Result:  Negative

## 2020-01-22 NOTE — PROGRESS NOTES
L&D Progress Note    Name:   Mare Eason   Date/Time:  2020 3:40 PM  Gestational Age:  36w0d  Admit Date:   2020  Admitting Dx:      Pregnancy at 36 weeks gestation  Preeclampsia complicating pregnancy  Indication for care in labor or delivery  Patient is status post prostaglandin gel for cervical ripening followed by 2 doses of Cytotec.  She received cervical balloon this morning for mechanical cervical dilation    Subjective:  Patient reports painful uterine contractions and received 2 doses of IV fentanyl and requested epidural.  Epidural was just performed.  Cervical balloon is still in place.  Patient denies any headache scotomata or right upper quadrant pain.  She reports normal fetal movements without any bleeding or leaking.    Objective:   Vitals:    20 1505 20 1506 20 1510 20 1515   BP:  137/84 128/79 132/82   Pulse: 73 69 78 80   Resp:       Temp:       TempSrc:       SpO2: 100%  100% 98%   Weight:       Height:         Fetal heart variability: moderate.  Fetal heart rate baseline is about 115 bpm with accelerations and no decelerations.  Moderate fetal heart rate variability is present.  On tocometer contractions were 1 to 3 minutes apart.  Fetal heart rate tracing is reactive  Cervical exam: Last exam was 1 cm dilated 75% effaced -1 station prior to cervical balloon placement        Meds:   Epidural : .  yes  Magnesium sulfate: . yes  Pitocin: .  no    Labs:  Recent Results (from the past 72 hour(s))   PROTEIN/CREAT RATIO URINE    Collection Time: 20 10:35 AM   Result Value Ref Range    Total Protein, Urine 35.0 (H) 0.0 - 15.0 mg/dL    Creatinine, Random Urine 101.70 mg/dL    Protein Creatinine Ratio 344 (H) 10 - 107 mg/g   POC UA    Collection Time: 20 10:45 AM   Result Value Ref Range    POC Color Yellow     POC Appearance Clear     POC Glucose Negative Negative mg/dL    POC Ketones >=160 (A) Negative mg/dL    POC Specific Gravity  1.015 1.005 - 1.030    POC Blood Negative Negative    POC Urine PH 6.5 5.0 - 8.0    POC Protein 30 (A) Negative mg/dL    POC Nitrites Negative Negative    POC Leukocyte Esterase Trace (A) Negative   CBC WITH DIFFERENTIAL    Collection Time: 01/21/20 10:50 AM   Result Value Ref Range    WBC 7.0 4.8 - 10.8 K/uL    RBC 4.21 4.20 - 5.40 M/uL    Hemoglobin 11.8 (L) 12.0 - 16.0 g/dL    Hematocrit 36.6 (L) 37.0 - 47.0 %    MCV 86.9 81.4 - 97.8 fL    MCH 28.0 27.0 - 33.0 pg    MCHC 32.2 (L) 33.6 - 35.0 g/dL    RDW 41.7 35.9 - 50.0 fL    Platelet Count 155 (L) 164 - 446 K/uL    MPV 11.2 9.0 - 12.9 fL    Neutrophils-Polys 74.90 (H) 44.00 - 72.00 %    Lymphocytes 17.40 (L) 22.00 - 41.00 %    Monocytes 6.40 0.00 - 13.40 %    Eosinophils 0.10 0.00 - 6.90 %    Basophils 0.10 0.00 - 1.80 %    Immature Granulocytes 1.10 (H) 0.00 - 0.90 %    Nucleated RBC 0.00 /100 WBC    Neutrophils (Absolute) 5.26 2.00 - 7.15 K/uL    Lymphs (Absolute) 1.22 1.00 - 4.80 K/uL    Monos (Absolute) 0.45 0.00 - 0.85 K/uL    Eos (Absolute) 0.01 0.00 - 0.51 K/uL    Baso (Absolute) 0.01 0.00 - 0.12 K/uL    Immature Granulocytes (abs) 0.08 0.00 - 0.11 K/uL    NRBC (Absolute) 0.00 K/uL   Comp Metabolic Panel    Collection Time: 01/21/20 10:50 AM   Result Value Ref Range    Sodium 135 135 - 145 mmol/L    Potassium 3.4 (L) 3.6 - 5.5 mmol/L    Chloride 101 96 - 112 mmol/L    Co2 22 20 - 33 mmol/L    Anion Gap 12.0 (H) 0.0 - 11.9    Glucose 86 65 - 99 mg/dL    Bun 8 8 - 22 mg/dL    Creatinine 0.87 0.50 - 1.40 mg/dL    Calcium 9.0 8.5 - 10.5 mg/dL    AST(SGOT) 14 12 - 45 U/L    ALT(SGPT) 9 2 - 50 U/L    Alkaline Phosphatase 105 (H) 30 - 99 U/L    Total Bilirubin 0.3 0.1 - 1.5 mg/dL    Albumin 3.7 3.2 - 4.9 g/dL    Total Protein 7.3 6.0 - 8.2 g/dL    Globulin 3.6 (H) 1.9 - 3.5 g/dL    A-G Ratio 1.0 g/dL   URIC ACID    Collection Time: 01/21/20 10:50 AM   Result Value Ref Range    Uric Acid 6.6 1.9 - 8.2 mg/dL   ESTIMATED GFR    Collection Time: 01/21/20 10:50 AM    Result Value Ref Range    GFR If African American >60 >60 mL/min/1.73 m 2    GFR If Non African American >60 >60 mL/min/1.73 m 2   SERIUM MAGNESIUM LEVEL 2 HRS AFTER LOADING DOSE INFUSED    Collection Time: 01/21/20  3:17 PM   Result Value Ref Range    Magnesium 5.5 (HH) 1.5 - 2.5 mg/dL   SERUM MAGNESIUM LEVELS EVERY 6 HRS UNTIL DESIRED RANGE (5-8 MG/DL) ACHIEVED    Collection Time: 01/21/20  9:07 PM   Result Value Ref Range    Magnesium 6.8 (HH) 1.5 - 2.5 mg/dL   SERUM MAGNESIUM LEVELS DAILY X 3 DAYS, STARTING AFTER DESIRED MAGNESIUM LEVEL ACHIEVED    Collection Time: 01/22/20  8:48 AM   Result Value Ref Range    Magnesium 7.0 (HH) 1.5 - 2.5 mg/dL         Assessment:   Gestational Age: 36w0d  Induction of labor for preeclampsia  Patient is on magnesium sulfate for seizure prophylaxis  Patient is undergoing mechanical cervical dilation with cervical balloon  Fetal heart rate tracing is reassuring/reactive    Patient Active Problem List    Diagnosis Date Noted   • Encounter for supervision of normal pregnancy in second trimester 10/04/2019     Priority: High   • Excessive weight gain during pregnancy in second trimester 11/14/2019     Priority: Medium       Plan:  Plan of care discussed with patient and patient agrees with management  We will continue with present management    Sunil Taylor M.D.

## 2020-01-23 LAB
ALBUMIN SERPL BCP-MCNC: 3.2 G/DL (ref 3.2–4.9)
ALBUMIN/GLOB SERPL: 0.9 G/DL
ALP SERPL-CCNC: 107 U/L (ref 30–99)
ALT SERPL-CCNC: 10 U/L (ref 2–50)
ANION GAP SERPL CALC-SCNC: 11 MMOL/L (ref 0–11.9)
AST SERPL-CCNC: 17 U/L (ref 12–45)
BILIRUB SERPL-MCNC: 0.2 MG/DL (ref 0.1–1.5)
BUN SERPL-MCNC: 7 MG/DL (ref 8–22)
CALCIUM SERPL-MCNC: 8.6 MG/DL (ref 8.5–10.5)
CHLORIDE SERPL-SCNC: 100 MMOL/L (ref 96–112)
CO2 SERPL-SCNC: 24 MMOL/L (ref 20–33)
CREAT SERPL-MCNC: 0.98 MG/DL (ref 0.5–1.4)
EKG IMPRESSION: NORMAL
ERYTHROCYTE [DISTWIDTH] IN BLOOD BY AUTOMATED COUNT: 44.9 FL (ref 35.9–50)
GLOBULIN SER CALC-MCNC: 3.5 G/DL (ref 1.9–3.5)
GLUCOSE SERPL-MCNC: 114 MG/DL (ref 65–99)
HCT VFR BLD AUTO: 37.5 % (ref 37–47)
HGB BLD-MCNC: 11.9 G/DL (ref 12–16)
MAGNESIUM SERPL-MCNC: 6.3 MG/DL (ref 1.5–2.5)
MCH RBC QN AUTO: 28.3 PG (ref 27–33)
MCHC RBC AUTO-ENTMCNC: 31.7 G/DL (ref 33.6–35)
MCV RBC AUTO: 89.3 FL (ref 81.4–97.8)
PLATELET # BLD AUTO: 180 K/UL (ref 164–446)
PMV BLD AUTO: 11.7 FL (ref 9–12.9)
POTASSIUM SERPL-SCNC: 4.1 MMOL/L (ref 3.6–5.5)
PROT SERPL-MCNC: 6.7 G/DL (ref 6–8.2)
RBC # BLD AUTO: 4.2 M/UL (ref 4.2–5.4)
SODIUM SERPL-SCNC: 135 MMOL/L (ref 135–145)
WBC # BLD AUTO: 13.6 K/UL (ref 4.8–10.8)

## 2020-01-23 PROCEDURE — 700102 HCHG RX REV CODE 250 W/ 637 OVERRIDE(OP): Performed by: STUDENT IN AN ORGANIZED HEALTH CARE EDUCATION/TRAINING PROGRAM

## 2020-01-23 PROCEDURE — 59515 CESAREAN DELIVERY: CPT | Performed by: OBSTETRICS & GYNECOLOGY

## 2020-01-23 PROCEDURE — 700111 HCHG RX REV CODE 636 W/ 250 OVERRIDE (IP): Performed by: ADVANCED PRACTICE MIDWIFE

## 2020-01-23 PROCEDURE — 36415 COLL VENOUS BLD VENIPUNCTURE: CPT

## 2020-01-23 PROCEDURE — 80053 COMPREHEN METABOLIC PANEL: CPT

## 2020-01-23 PROCEDURE — 93010 ELECTROCARDIOGRAM REPORT: CPT | Performed by: INTERNAL MEDICINE

## 2020-01-23 PROCEDURE — 700105 HCHG RX REV CODE 258: Performed by: OBSTETRICS & GYNECOLOGY

## 2020-01-23 PROCEDURE — 700111 HCHG RX REV CODE 636 W/ 250 OVERRIDE (IP): Performed by: OBSTETRICS & GYNECOLOGY

## 2020-01-23 PROCEDURE — 700111 HCHG RX REV CODE 636 W/ 250 OVERRIDE (IP)

## 2020-01-23 PROCEDURE — 700105 HCHG RX REV CODE 258: Performed by: ANESTHESIOLOGY

## 2020-01-23 PROCEDURE — A9270 NON-COVERED ITEM OR SERVICE: HCPCS | Performed by: OBSTETRICS & GYNECOLOGY

## 2020-01-23 PROCEDURE — 700111 HCHG RX REV CODE 636 W/ 250 OVERRIDE (IP): Performed by: NURSE PRACTITIONER

## 2020-01-23 PROCEDURE — 700102 HCHG RX REV CODE 250 W/ 637 OVERRIDE(OP): Performed by: OBSTETRICS & GYNECOLOGY

## 2020-01-23 PROCEDURE — 85027 COMPLETE CBC AUTOMATED: CPT

## 2020-01-23 PROCEDURE — A9270 NON-COVERED ITEM OR SERVICE: HCPCS | Performed by: ANESTHESIOLOGY

## 2020-01-23 PROCEDURE — 304966 HCHG RECOVERY SVSC TIME ADDL 1/2 HR: Performed by: OBSTETRICS & GYNECOLOGY

## 2020-01-23 PROCEDURE — 700111 HCHG RX REV CODE 636 W/ 250 OVERRIDE (IP): Performed by: ANESTHESIOLOGY

## 2020-01-23 PROCEDURE — 306828 HCHG ANES-TIME GENERAL: Performed by: OBSTETRICS & GYNECOLOGY

## 2020-01-23 PROCEDURE — 83735 ASSAY OF MAGNESIUM: CPT

## 2020-01-23 PROCEDURE — 93005 ELECTROCARDIOGRAM TRACING: CPT | Performed by: OBSTETRICS & GYNECOLOGY

## 2020-01-23 PROCEDURE — 700102 HCHG RX REV CODE 250 W/ 637 OVERRIDE(OP): Performed by: ANESTHESIOLOGY

## 2020-01-23 PROCEDURE — 700111 HCHG RX REV CODE 636 W/ 250 OVERRIDE (IP): Performed by: STUDENT IN AN ORGANIZED HEALTH CARE EDUCATION/TRAINING PROGRAM

## 2020-01-23 PROCEDURE — 700101 HCHG RX REV CODE 250: Performed by: ANESTHESIOLOGY

## 2020-01-23 PROCEDURE — 700101 HCHG RX REV CODE 250

## 2020-01-23 PROCEDURE — 770002 HCHG ROOM/CARE - OB PRIVATE (112)

## 2020-01-23 PROCEDURE — A9270 NON-COVERED ITEM OR SERVICE: HCPCS | Performed by: STUDENT IN AN ORGANIZED HEALTH CARE EDUCATION/TRAINING PROGRAM

## 2020-01-23 PROCEDURE — 305385 HCHG SURGICAL SERVICES 1/4 HOUR: Performed by: OBSTETRICS & GYNECOLOGY

## 2020-01-23 PROCEDURE — 304964 HCHG RECOVERY ROOM TIME 1HR: Performed by: OBSTETRICS & GYNECOLOGY

## 2020-01-23 RX ORDER — MORPHINE SULFATE 0.5 MG/ML
INJECTION, SOLUTION EPIDURAL; INTRATHECAL; INTRAVENOUS PRN
Status: DISCONTINUED | OUTPATIENT
Start: 2020-01-23 | End: 2020-01-23 | Stop reason: SURG

## 2020-01-23 RX ORDER — CITRIC ACID/SODIUM CITRATE 334-500MG
30 SOLUTION, ORAL ORAL ONCE
Status: COMPLETED | OUTPATIENT
Start: 2020-01-23 | End: 2020-01-23

## 2020-01-23 RX ORDER — HYDROMORPHONE HYDROCHLORIDE 1 MG/ML
0.6 INJECTION, SOLUTION INTRAMUSCULAR; INTRAVENOUS; SUBCUTANEOUS
Status: DISCONTINUED | OUTPATIENT
Start: 2020-01-23 | End: 2020-01-24 | Stop reason: HOSPADM

## 2020-01-23 RX ORDER — OXYCODONE HCL 5 MG/5 ML
5 SOLUTION, ORAL ORAL
Status: COMPLETED | OUTPATIENT
Start: 2020-01-23 | End: 2020-01-23

## 2020-01-23 RX ORDER — OXYCODONE HCL 5 MG/5 ML
SOLUTION, ORAL ORAL
Status: COMPLETED
Start: 2020-01-23 | End: 2020-01-23

## 2020-01-23 RX ORDER — ROPIVACAINE HYDROCHLORIDE 2 MG/ML
INJECTION, SOLUTION EPIDURAL; INFILTRATION; PERINEURAL
Status: COMPLETED
Start: 2020-01-23 | End: 2020-01-23

## 2020-01-23 RX ORDER — VITAMIN A ACETATE, BETA CAROTENE, ASCORBIC ACID, CHOLECALCIFEROL, .ALPHA.-TOCOPHEROL ACETATE, DL-, THIAMINE MONONITRATE, RIBOFLAVIN, NIACINAMIDE, PYRIDOXINE HYDROCHLORIDE, FOLIC ACID, CYANOCOBALAMIN, CALCIUM CARBONATE, FERROUS FUMARATE, ZINC OXIDE, CUPRIC OXIDE 3080; 12; 120; 400; 1; 1.84; 3; 20; 22; 920; 25; 200; 27; 10; 2 [IU]/1; UG/1; MG/1; [IU]/1; MG/1; MG/1; MG/1; MG/1; MG/1; [IU]/1; MG/1; MG/1; MG/1; MG/1; MG/1
1 TABLET, FILM COATED ORAL EVERY MORNING
Status: DISCONTINUED | OUTPATIENT
Start: 2020-01-24 | End: 2020-01-26 | Stop reason: HOSPADM

## 2020-01-23 RX ORDER — DIPHENHYDRAMINE HYDROCHLORIDE 50 MG/ML
12.5 INJECTION INTRAMUSCULAR; INTRAVENOUS
Status: DISCONTINUED | OUTPATIENT
Start: 2020-01-23 | End: 2020-01-24 | Stop reason: HOSPADM

## 2020-01-23 RX ORDER — MAGNESIUM SULFATE HEPTAHYDRATE 40 MG/ML
2 INJECTION, SOLUTION INTRAVENOUS CONTINUOUS
Status: DISCONTINUED | OUTPATIENT
Start: 2020-01-23 | End: 2020-01-24

## 2020-01-23 RX ORDER — ACETAMINOPHEN 500 MG
1000 TABLET ORAL EVERY 6 HOURS
Status: DISCONTINUED | OUTPATIENT
Start: 2020-01-23 | End: 2020-01-23

## 2020-01-23 RX ORDER — OXYCODONE HYDROCHLORIDE 5 MG/1
5 TABLET ORAL EVERY 4 HOURS PRN
Status: ACTIVE | OUTPATIENT
Start: 2020-01-23 | End: 2020-01-24

## 2020-01-23 RX ORDER — HYDROMORPHONE HYDROCHLORIDE 1 MG/ML
0.4 INJECTION, SOLUTION INTRAMUSCULAR; INTRAVENOUS; SUBCUTANEOUS
Status: DISCONTINUED | OUTPATIENT
Start: 2020-01-23 | End: 2020-01-24 | Stop reason: HOSPADM

## 2020-01-23 RX ORDER — ONDANSETRON 4 MG/1
4 TABLET, ORALLY DISINTEGRATING ORAL EVERY 4 HOURS PRN
Status: DISCONTINUED | OUTPATIENT
Start: 2020-01-23 | End: 2020-01-26 | Stop reason: HOSPADM

## 2020-01-23 RX ORDER — KETAMINE HYDROCHLORIDE 50 MG/ML
INJECTION, SOLUTION INTRAMUSCULAR; INTRAVENOUS PRN
Status: DISCONTINUED | OUTPATIENT
Start: 2020-01-23 | End: 2020-01-23 | Stop reason: SURG

## 2020-01-23 RX ORDER — METOCLOPRAMIDE HYDROCHLORIDE 5 MG/ML
10 INJECTION INTRAMUSCULAR; INTRAVENOUS ONCE
Status: COMPLETED | OUTPATIENT
Start: 2020-01-23 | End: 2020-01-23

## 2020-01-23 RX ORDER — METHYLERGONOVINE MALEATE 0.2 MG/ML
INJECTION INTRAVENOUS
Status: COMPLETED
Start: 2020-01-23 | End: 2020-01-23

## 2020-01-23 RX ORDER — SODIUM CHLORIDE, SODIUM GLUCONATE, SODIUM ACETATE, POTASSIUM CHLORIDE AND MAGNESIUM CHLORIDE 526; 502; 368; 37; 30 MG/100ML; MG/100ML; MG/100ML; MG/100ML; MG/100ML
INJECTION, SOLUTION INTRAVENOUS
Status: DISCONTINUED | OUTPATIENT
Start: 2020-01-22 | End: 2020-01-23 | Stop reason: SURG

## 2020-01-23 RX ORDER — BISACODYL 10 MG
10 SUPPOSITORY, RECTAL RECTAL PRN
Status: DISCONTINUED | OUTPATIENT
Start: 2020-01-23 | End: 2020-01-26 | Stop reason: HOSPADM

## 2020-01-23 RX ORDER — OXYCODONE HCL 5 MG/5 ML
10 SOLUTION, ORAL ORAL
Status: COMPLETED | OUTPATIENT
Start: 2020-01-23 | End: 2020-01-23

## 2020-01-23 RX ORDER — IBUPROFEN 800 MG/1
800 TABLET ORAL EVERY 8 HOURS PRN
Status: DISCONTINUED | OUTPATIENT
Start: 2020-01-23 | End: 2020-01-23

## 2020-01-23 RX ORDER — LIDOCAINE HCL/EPINEPHRINE/PF 2%-1:200K
VIAL (ML) INJECTION PRN
Status: DISCONTINUED | OUTPATIENT
Start: 2020-01-23 | End: 2020-01-23 | Stop reason: HOSPADM

## 2020-01-23 RX ORDER — HYDROMORPHONE HYDROCHLORIDE 1 MG/ML
0.2 INJECTION, SOLUTION INTRAMUSCULAR; INTRAVENOUS; SUBCUTANEOUS
Status: DISCONTINUED | OUTPATIENT
Start: 2020-01-23 | End: 2020-01-24

## 2020-01-23 RX ORDER — DIPHENHYDRAMINE HYDROCHLORIDE 50 MG/ML
25 INJECTION INTRAMUSCULAR; INTRAVENOUS EVERY 6 HOURS PRN
Status: DISCONTINUED | OUTPATIENT
Start: 2020-01-23 | End: 2020-01-26 | Stop reason: HOSPADM

## 2020-01-23 RX ORDER — OXYCODONE HYDROCHLORIDE 5 MG/1
5 TABLET ORAL EVERY 4 HOURS PRN
Status: DISCONTINUED | OUTPATIENT
Start: 2020-01-23 | End: 2020-01-26 | Stop reason: HOSPADM

## 2020-01-23 RX ORDER — DOCUSATE SODIUM 100 MG/1
100 CAPSULE, LIQUID FILLED ORAL 2 TIMES DAILY PRN
Status: DISCONTINUED | OUTPATIENT
Start: 2020-01-23 | End: 2020-01-26 | Stop reason: HOSPADM

## 2020-01-23 RX ORDER — NIFEDIPINE 10 MG/1
10 CAPSULE ORAL ONCE
Status: COMPLETED | OUTPATIENT
Start: 2020-01-23 | End: 2020-01-23

## 2020-01-23 RX ORDER — ACETAMINOPHEN 500 MG
1000 TABLET ORAL EVERY 6 HOURS
Status: DISPENSED | OUTPATIENT
Start: 2020-01-23 | End: 2020-01-24

## 2020-01-23 RX ORDER — CARBOPROST TROMETHAMINE 250 UG/ML
INJECTION, SOLUTION INTRAMUSCULAR
Status: COMPLETED
Start: 2020-01-23 | End: 2020-01-23

## 2020-01-23 RX ORDER — OXYCODONE HYDROCHLORIDE 10 MG/1
10 TABLET ORAL EVERY 4 HOURS PRN
Status: DISCONTINUED | OUTPATIENT
Start: 2020-01-23 | End: 2020-01-26 | Stop reason: HOSPADM

## 2020-01-23 RX ORDER — CITRIC ACID/SODIUM CITRATE 334-500MG
SOLUTION, ORAL ORAL
Status: COMPLETED
Start: 2020-01-23 | End: 2020-01-23

## 2020-01-23 RX ORDER — KETOROLAC TROMETHAMINE 30 MG/ML
30 INJECTION, SOLUTION INTRAMUSCULAR; INTRAVENOUS EVERY 6 HOURS
Status: DISCONTINUED | OUTPATIENT
Start: 2020-01-24 | End: 2020-01-23

## 2020-01-23 RX ORDER — CEFAZOLIN SODIUM 1 G/3ML
INJECTION, POWDER, FOR SOLUTION INTRAMUSCULAR; INTRAVENOUS PRN
Status: DISCONTINUED | OUTPATIENT
Start: 2020-01-23 | End: 2020-01-23 | Stop reason: SURG

## 2020-01-23 RX ORDER — OXYCODONE HYDROCHLORIDE 10 MG/1
10 TABLET ORAL EVERY 4 HOURS PRN
Status: DISCONTINUED | OUTPATIENT
Start: 2020-01-23 | End: 2020-01-24

## 2020-01-23 RX ORDER — DIPHENHYDRAMINE HCL 25 MG
25 TABLET ORAL EVERY 6 HOURS PRN
Status: DISCONTINUED | OUTPATIENT
Start: 2020-01-23 | End: 2020-01-26 | Stop reason: HOSPADM

## 2020-01-23 RX ORDER — SODIUM CHLORIDE, SODIUM LACTATE, POTASSIUM CHLORIDE, CALCIUM CHLORIDE 600; 310; 30; 20 MG/100ML; MG/100ML; MG/100ML; MG/100ML
INJECTION, SOLUTION INTRAVENOUS CONTINUOUS
Status: DISCONTINUED | OUTPATIENT
Start: 2020-01-23 | End: 2020-01-24

## 2020-01-23 RX ORDER — HALOPERIDOL 5 MG/ML
1 INJECTION INTRAMUSCULAR
Status: DISCONTINUED | OUTPATIENT
Start: 2020-01-23 | End: 2020-01-24 | Stop reason: HOSPADM

## 2020-01-23 RX ORDER — METOPROLOL TARTRATE 1 MG/ML
1 INJECTION, SOLUTION INTRAVENOUS
Status: DISCONTINUED | OUTPATIENT
Start: 2020-01-23 | End: 2020-01-24 | Stop reason: HOSPADM

## 2020-01-23 RX ORDER — ONDANSETRON 2 MG/ML
INJECTION INTRAMUSCULAR; INTRAVENOUS PRN
Status: DISCONTINUED | OUTPATIENT
Start: 2020-01-23 | End: 2020-01-23 | Stop reason: SURG

## 2020-01-23 RX ORDER — HYDRALAZINE HYDROCHLORIDE 20 MG/ML
5 INJECTION INTRAMUSCULAR; INTRAVENOUS
Status: DISCONTINUED | OUTPATIENT
Start: 2020-01-23 | End: 2020-01-24 | Stop reason: HOSPADM

## 2020-01-23 RX ORDER — NIFEDIPINE 10 MG/1
10 CAPSULE ORAL ONCE
Status: COMPLETED | OUTPATIENT
Start: 2020-01-23 | End: 2020-01-24

## 2020-01-23 RX ORDER — HYDROMORPHONE HYDROCHLORIDE 1 MG/ML
0.4 INJECTION, SOLUTION INTRAMUSCULAR; INTRAVENOUS; SUBCUTANEOUS
Status: DISCONTINUED | OUTPATIENT
Start: 2020-01-23 | End: 2020-01-24

## 2020-01-23 RX ORDER — SIMETHICONE 80 MG
80 TABLET,CHEWABLE ORAL 3 TIMES DAILY PRN
Status: DISCONTINUED | OUTPATIENT
Start: 2020-01-23 | End: 2020-01-26 | Stop reason: HOSPADM

## 2020-01-23 RX ORDER — HYDROMORPHONE HYDROCHLORIDE 1 MG/ML
0.2 INJECTION, SOLUTION INTRAMUSCULAR; INTRAVENOUS; SUBCUTANEOUS
Status: DISCONTINUED | OUTPATIENT
Start: 2020-01-23 | End: 2020-01-24 | Stop reason: HOSPADM

## 2020-01-23 RX ORDER — ONDANSETRON 2 MG/ML
4 INJECTION INTRAMUSCULAR; INTRAVENOUS
Status: COMPLETED | OUTPATIENT
Start: 2020-01-23 | End: 2020-01-23

## 2020-01-23 RX ORDER — KETOROLAC TROMETHAMINE 30 MG/ML
30 INJECTION, SOLUTION INTRAMUSCULAR; INTRAVENOUS EVERY 6 HOURS
Status: COMPLETED | OUTPATIENT
Start: 2020-01-24 | End: 2020-01-24

## 2020-01-23 RX ORDER — SODIUM CHLORIDE, SODIUM GLUCONATE, SODIUM ACETATE, POTASSIUM CHLORIDE AND MAGNESIUM CHLORIDE 526; 502; 368; 37; 30 MG/100ML; MG/100ML; MG/100ML; MG/100ML; MG/100ML
1500 INJECTION, SOLUTION INTRAVENOUS ONCE
Status: COMPLETED | OUTPATIENT
Start: 2020-01-23 | End: 2020-01-23

## 2020-01-23 RX ORDER — MEPERIDINE HYDROCHLORIDE 25 MG/ML
12.5 INJECTION INTRAMUSCULAR; INTRAVENOUS; SUBCUTANEOUS
Status: DISCONTINUED | OUTPATIENT
Start: 2020-01-23 | End: 2020-01-24 | Stop reason: HOSPADM

## 2020-01-23 RX ORDER — ACETAMINOPHEN 325 MG/1
650 TABLET ORAL EVERY 4 HOURS PRN
Status: DISCONTINUED | OUTPATIENT
Start: 2020-01-23 | End: 2020-01-26

## 2020-01-23 RX ORDER — MIDAZOLAM HYDROCHLORIDE 1 MG/ML
INJECTION INTRAMUSCULAR; INTRAVENOUS PRN
Status: DISCONTINUED | OUTPATIENT
Start: 2020-01-23 | End: 2020-01-23 | Stop reason: SURG

## 2020-01-23 RX ORDER — IBUPROFEN 800 MG/1
800 TABLET ORAL EVERY 8 HOURS
Status: DISCONTINUED | OUTPATIENT
Start: 2020-01-24 | End: 2020-01-23

## 2020-01-23 RX ORDER — DEXAMETHASONE SODIUM PHOSPHATE 4 MG/ML
INJECTION, SOLUTION INTRA-ARTICULAR; INTRALESIONAL; INTRAMUSCULAR; INTRAVENOUS; SOFT TISSUE PRN
Status: DISCONTINUED | OUTPATIENT
Start: 2020-01-23 | End: 2020-01-23 | Stop reason: SURG

## 2020-01-23 RX ADMIN — SODIUM CHLORIDE, POTASSIUM CHLORIDE, SODIUM LACTATE AND CALCIUM CHLORIDE: 600; 310; 30; 20 INJECTION, SOLUTION INTRAVENOUS at 08:54

## 2020-01-23 RX ADMIN — ONDANSETRON 4 MG: 2 INJECTION INTRAMUSCULAR; INTRAVENOUS at 19:50

## 2020-01-23 RX ADMIN — METOCLOPRAMIDE 10 MG: 5 INJECTION, SOLUTION INTRAMUSCULAR; INTRAVENOUS at 19:03

## 2020-01-23 RX ADMIN — OXYCODONE HYDROCHLORIDE 5 MG: 5 SOLUTION ORAL at 20:58

## 2020-01-23 RX ADMIN — SODIUM CHLORIDE, SODIUM GLUCONATE, SODIUM ACETATE, POTASSIUM CHLORIDE AND MAGNESIUM CHLORIDE 1000 ML: 526; 502; 368; 37; 30 INJECTION, SOLUTION INTRAVENOUS at 18:48

## 2020-01-23 RX ADMIN — SODIUM CHLORIDE 2.5 MILLION UNITS: 9 INJECTION, SOLUTION INTRAVENOUS at 06:00

## 2020-01-23 RX ADMIN — MAGNESIUM SULFATE HEPTAHYDRATE 1.5 G/HR: 40 INJECTION, SOLUTION INTRAVENOUS at 08:55

## 2020-01-23 RX ADMIN — OXYTOCIN 20 ML: 10 INJECTION, SOLUTION INTRAMUSCULAR; INTRAVENOUS at 19:41

## 2020-01-23 RX ADMIN — SODIUM CHLORIDE 2.5 MILLION UNITS: 9 INJECTION, SOLUTION INTRAVENOUS at 02:00

## 2020-01-23 RX ADMIN — AZITHROMYCIN MONOHYDRATE 500 MG: 500 INJECTION, POWDER, LYOPHILIZED, FOR SOLUTION INTRAVENOUS at 18:45

## 2020-01-23 RX ADMIN — ROPIVACAINE HYDROCHLORIDE: 2 INJECTION, SOLUTION EPIDURAL; INFILTRATION; PERINEURAL at 02:15

## 2020-01-23 RX ADMIN — ROPIVACAINE HYDROCHLORIDE 200 MG: 2 INJECTION, SOLUTION EPIDURAL; INFILTRATION at 12:05

## 2020-01-23 RX ADMIN — ONDANSETRON 4 MG: 4 TABLET, ORALLY DISINTEGRATING ORAL at 08:53

## 2020-01-23 RX ADMIN — Medication 20 MILLI-UNITS/MIN: at 10:04

## 2020-01-23 RX ADMIN — SODIUM CHLORIDE 2.5 MILLION UNITS: 9 INJECTION, SOLUTION INTRAVENOUS at 14:29

## 2020-01-23 RX ADMIN — DEXAMETHASONE SODIUM PHOSPHATE 4 MG: 4 INJECTION, SOLUTION INTRA-ARTICULAR; INTRALESIONAL; INTRAMUSCULAR; INTRAVENOUS; SOFT TISSUE at 19:50

## 2020-01-23 RX ADMIN — ACETAMINOPHEN 650 MG: 325 TABLET, FILM COATED ORAL at 16:36

## 2020-01-23 RX ADMIN — FAMOTIDINE 20 MG: 10 INJECTION INTRAVENOUS at 19:03

## 2020-01-23 RX ADMIN — NIFEDIPINE 10 MG: 10 CAPSULE ORAL at 22:17

## 2020-01-23 RX ADMIN — SODIUM CHLORIDE 2.5 MILLION UNITS: 9 INJECTION, SOLUTION INTRAVENOUS at 10:39

## 2020-01-23 RX ADMIN — MIDAZOLAM HYDROCHLORIDE 1 MG: 1 INJECTION, SOLUTION INTRAMUSCULAR; INTRAVENOUS at 19:58

## 2020-01-23 RX ADMIN — CEFAZOLIN 2 G: 330 INJECTION, POWDER, FOR SOLUTION INTRAMUSCULAR; INTRAVENOUS at 19:24

## 2020-01-23 RX ADMIN — SIMETHICONE CHEW TAB 80 MG 80 MG: 80 TABLET ORAL at 14:26

## 2020-01-23 RX ADMIN — FENTANYL CITRATE 25 MCG: 0.05 INJECTION, SOLUTION INTRAMUSCULAR; INTRAVENOUS at 20:27

## 2020-01-23 RX ADMIN — SODIUM CITRATE AND CITRIC ACID MONOHYDRATE 30 ML: 500; 334 SOLUTION ORAL at 19:02

## 2020-01-23 RX ADMIN — ONDANSETRON 4 MG: 2 INJECTION INTRAMUSCULAR; INTRAVENOUS at 22:38

## 2020-01-23 RX ADMIN — KETAMINE HYDROCHLORIDE 10 MG: 50 INJECTION INTRAMUSCULAR; INTRAVENOUS at 19:59

## 2020-01-23 RX ADMIN — FENTANYL CITRATE 25 MCG: 0.05 INJECTION, SOLUTION INTRAMUSCULAR; INTRAVENOUS at 19:57

## 2020-01-23 RX ADMIN — ROPIVACAINE HYDROCHLORIDE: 2 INJECTION, SOLUTION EPIDURAL; INFILTRATION at 02:15

## 2020-01-23 RX ADMIN — LIDOCAINE HYDROCHLORIDE,EPINEPHRINE BITARTRATE 20 ML: 20; .005 INJECTION, SOLUTION EPIDURAL; INFILTRATION; INTRACAUDAL; PERINEURAL at 19:25

## 2020-01-23 RX ADMIN — CARBOPROST TROMETHAMINE 250 MCG: 250 INJECTION, SOLUTION INTRAMUSCULAR at 20:20

## 2020-01-23 RX ADMIN — KETAMINE HYDROCHLORIDE 10 MG: 50 INJECTION INTRAMUSCULAR; INTRAVENOUS at 19:38

## 2020-01-23 RX ADMIN — MAGNESIUM SULFATE IN WATER 2 G/HR: 40 INJECTION, SOLUTION INTRAVENOUS at 21:05

## 2020-01-23 RX ADMIN — OXYTOCIN 2000 ML/HR: 10 INJECTION, SOLUTION INTRAMUSCULAR; INTRAVENOUS at 19:45

## 2020-01-23 RX ADMIN — MORPHINE SULFATE 1.5 MG: 0.5 INJECTION, SOLUTION EPIDURAL; INTRATHECAL; INTRAVENOUS at 20:18

## 2020-01-23 RX ADMIN — FENTANYL CITRATE 100 MCG: 0.05 INJECTION, SOLUTION INTRAMUSCULAR; INTRAVENOUS at 21:19

## 2020-01-23 RX ADMIN — OXYTOCIN 125 ML/HR: 10 INJECTION, SOLUTION INTRAMUSCULAR; INTRAVENOUS at 21:22

## 2020-01-23 ASSESSMENT — PATIENT HEALTH QUESTIONNAIRE - PHQ9
SUM OF ALL RESPONSES TO PHQ9 QUESTIONS 1 AND 2: 0
1. LITTLE INTEREST OR PLEASURE IN DOING THINGS: NOT AT ALL
2. FEELING DOWN, DEPRESSED, IRRITABLE, OR HOPELESS: NOT AT ALL

## 2020-01-23 ASSESSMENT — PAIN SCALES - GENERAL
PAIN_LEVEL: 0
PAIN_LEVEL: 1

## 2020-01-23 NOTE — PROGRESS NOTES
Mare Eason   36w0d    Subjective: Pt comfortable with epidural in place.      uterine contractions:yes, pain: .no  nausea/vomiting: .noepigastric pain:.no:      fetal movement: normal, vaginal bleeding: .no    Objective:   Vitals:    01/22/20 1948 01/22/20 2007 01/22/20 2027 01/22/20 2048   BP: 111/55 111/58 129/83 125/79   Pulse: 97 (!) 110 93 85   Resp:       Temp:       TempSrc:       SpO2:       Weight:       Height:         Fetal heart variability: moderate  Fetal Heart Rate decelerations: none  Fetal Heart Rate accelerations: yes  Baseline FHR: 125 per minute  Contractions: 2.5-3 minutes apart  Cervical Exam external os 2 cm, internal os 3-4 cm/70%/-3 but ballotable  Fetal position: Cephalic  Membranes: ruptured: .no  AROM: .no, Meconium: .N\A  IUPC: .no, FSE .no    Meds:     Epidural : .yes  Magnesium sulfate: .yes  Pitocin: .yes at 16 mu/min    Labs:    Lab:   Recent Results (from the past 48 hour(s))   PROTEIN/CREAT RATIO URINE    Collection Time: 01/21/20 10:35 AM   Result Value Ref Range    Total Protein, Urine 35.0 (H) 0.0 - 15.0 mg/dL    Creatinine, Random Urine 101.70 mg/dL    Protein Creatinine Ratio 344 (H) 10 - 107 mg/g   POC UA    Collection Time: 01/21/20 10:45 AM   Result Value Ref Range    POC Color Yellow     POC Appearance Clear     POC Glucose Negative Negative mg/dL    POC Ketones >=160 (A) Negative mg/dL    POC Specific Gravity 1.015 1.005 - 1.030    POC Blood Negative Negative    POC Urine PH 6.5 5.0 - 8.0    POC Protein 30 (A) Negative mg/dL    POC Nitrites Negative Negative    POC Leukocyte Esterase Trace (A) Negative   CBC WITH DIFFERENTIAL    Collection Time: 01/21/20 10:50 AM   Result Value Ref Range    WBC 7.0 4.8 - 10.8 K/uL    RBC 4.21 4.20 - 5.40 M/uL    Hemoglobin 11.8 (L) 12.0 - 16.0 g/dL    Hematocrit 36.6 (L) 37.0 - 47.0 %    MCV 86.9 81.4 - 97.8 fL    MCH 28.0 27.0 - 33.0 pg    MCHC 32.2 (L) 33.6 - 35.0 g/dL    RDW 41.7 35.9 - 50.0 fL    Platelet Count 155  (L) 164 - 446 K/uL    MPV 11.2 9.0 - 12.9 fL    Neutrophils-Polys 74.90 (H) 44.00 - 72.00 %    Lymphocytes 17.40 (L) 22.00 - 41.00 %    Monocytes 6.40 0.00 - 13.40 %    Eosinophils 0.10 0.00 - 6.90 %    Basophils 0.10 0.00 - 1.80 %    Immature Granulocytes 1.10 (H) 0.00 - 0.90 %    Nucleated RBC 0.00 /100 WBC    Neutrophils (Absolute) 5.26 2.00 - 7.15 K/uL    Lymphs (Absolute) 1.22 1.00 - 4.80 K/uL    Monos (Absolute) 0.45 0.00 - 0.85 K/uL    Eos (Absolute) 0.01 0.00 - 0.51 K/uL    Baso (Absolute) 0.01 0.00 - 0.12 K/uL    Immature Granulocytes (abs) 0.08 0.00 - 0.11 K/uL    NRBC (Absolute) 0.00 K/uL   Comp Metabolic Panel    Collection Time: 01/21/20 10:50 AM   Result Value Ref Range    Sodium 135 135 - 145 mmol/L    Potassium 3.4 (L) 3.6 - 5.5 mmol/L    Chloride 101 96 - 112 mmol/L    Co2 22 20 - 33 mmol/L    Anion Gap 12.0 (H) 0.0 - 11.9    Glucose 86 65 - 99 mg/dL    Bun 8 8 - 22 mg/dL    Creatinine 0.87 0.50 - 1.40 mg/dL    Calcium 9.0 8.5 - 10.5 mg/dL    AST(SGOT) 14 12 - 45 U/L    ALT(SGPT) 9 2 - 50 U/L    Alkaline Phosphatase 105 (H) 30 - 99 U/L    Total Bilirubin 0.3 0.1 - 1.5 mg/dL    Albumin 3.7 3.2 - 4.9 g/dL    Total Protein 7.3 6.0 - 8.2 g/dL    Globulin 3.6 (H) 1.9 - 3.5 g/dL    A-G Ratio 1.0 g/dL   URIC ACID    Collection Time: 01/21/20 10:50 AM   Result Value Ref Range    Uric Acid 6.6 1.9 - 8.2 mg/dL   ESTIMATED GFR    Collection Time: 01/21/20 10:50 AM   Result Value Ref Range    GFR If African American >60 >60 mL/min/1.73 m 2    GFR If Non African American >60 >60 mL/min/1.73 m 2   SERIUM MAGNESIUM LEVEL 2 HRS AFTER LOADING DOSE INFUSED    Collection Time: 01/21/20  3:17 PM   Result Value Ref Range    Magnesium 5.5 (HH) 1.5 - 2.5 mg/dL   SERUM MAGNESIUM LEVELS EVERY 6 HRS UNTIL DESIRED RANGE (5-8 MG/DL) ACHIEVED    Collection Time: 01/21/20  9:07 PM   Result Value Ref Range    Magnesium 6.8 (HH) 1.5 - 2.5 mg/dL   SERUM MAGNESIUM LEVELS DAILY X 3 DAYS, STARTING AFTER DESIRED MAGNESIUM LEVEL  ACHIEVED    Collection Time: 01/22/20  8:48 AM   Result Value Ref Range    Magnesium 7.0 (HH) 1.5 - 2.5 mg/dL       Ass:   36w0d IOL for severe preeclampsia, currently on magnesium sulfate   BP's 120-130/60-80   S/p prostaglandin gel, misoprostol x 2, CRB with pitocin  Labor State: Early latent labor.  GBS unknown being treated with penicillin    P.   1. CRB removed manually  2. Will position in high fowlers and continue pitocin to try to bring the head down more.  Reassess in 1-2 hours and AROM if appropriate  3. Continue magnesium sulfate infusion and hypertensive protocol    WILLIE Eid Dr attending MD

## 2020-01-23 NOTE — PROGRESS NOTES
Mare Eason   36w0d    Subjective: Pt resting comfortably with epidural in place.  She has a mild headache but declines tylenol at this time.     uterine contractions:yes, pain: .no  nausea/vomiting: .yes, mild nausea epigastric pain:.no:      fetal movement: normal, vaginal bleeding: .no    Objective:   Vitals:    01/22/20 2027 01/22/20 2048 01/22/20 2200 01/22/20 2226   BP: 129/83 125/79 135/91 139/88   Pulse: 93 85 75 78   Resp:       Temp:       TempSrc:       SpO2:   99%    Weight:       Height:         Fetal heart variability: moderate  Fetal Heart Rate decelerations: none  Fetal Heart Rate accelerations: yes  Baseline FHR: 125 per minute  Contractions: 1.5-7 minutes apart  Cervical Exam 3/80/-2  Fetal position: Cephalic  Membranes: ruptured: .yes  AROM: .yes, Meconium: .no  IUPC: .yes, FSE .no    Meds:     Epidural : .yes  Magnesium sulfate: .yes  Pitocin: .yes at 18 mu/min    Labs:    Lab:   Recent Results (from the past 48 hour(s))   PROTEIN/CREAT RATIO URINE    Collection Time: 01/21/20 10:35 AM   Result Value Ref Range    Total Protein, Urine 35.0 (H) 0.0 - 15.0 mg/dL    Creatinine, Random Urine 101.70 mg/dL    Protein Creatinine Ratio 344 (H) 10 - 107 mg/g   POC UA    Collection Time: 01/21/20 10:45 AM   Result Value Ref Range    POC Color Yellow     POC Appearance Clear     POC Glucose Negative Negative mg/dL    POC Ketones >=160 (A) Negative mg/dL    POC Specific Gravity 1.015 1.005 - 1.030    POC Blood Negative Negative    POC Urine PH 6.5 5.0 - 8.0    POC Protein 30 (A) Negative mg/dL    POC Nitrites Negative Negative    POC Leukocyte Esterase Trace (A) Negative   CBC WITH DIFFERENTIAL    Collection Time: 01/21/20 10:50 AM   Result Value Ref Range    WBC 7.0 4.8 - 10.8 K/uL    RBC 4.21 4.20 - 5.40 M/uL    Hemoglobin 11.8 (L) 12.0 - 16.0 g/dL    Hematocrit 36.6 (L) 37.0 - 47.0 %    MCV 86.9 81.4 - 97.8 fL    MCH 28.0 27.0 - 33.0 pg    MCHC 32.2 (L) 33.6 - 35.0 g/dL    RDW 41.7 35.9 -  50.0 fL    Platelet Count 155 (L) 164 - 446 K/uL    MPV 11.2 9.0 - 12.9 fL    Neutrophils-Polys 74.90 (H) 44.00 - 72.00 %    Lymphocytes 17.40 (L) 22.00 - 41.00 %    Monocytes 6.40 0.00 - 13.40 %    Eosinophils 0.10 0.00 - 6.90 %    Basophils 0.10 0.00 - 1.80 %    Immature Granulocytes 1.10 (H) 0.00 - 0.90 %    Nucleated RBC 0.00 /100 WBC    Neutrophils (Absolute) 5.26 2.00 - 7.15 K/uL    Lymphs (Absolute) 1.22 1.00 - 4.80 K/uL    Monos (Absolute) 0.45 0.00 - 0.85 K/uL    Eos (Absolute) 0.01 0.00 - 0.51 K/uL    Baso (Absolute) 0.01 0.00 - 0.12 K/uL    Immature Granulocytes (abs) 0.08 0.00 - 0.11 K/uL    NRBC (Absolute) 0.00 K/uL   Comp Metabolic Panel    Collection Time: 01/21/20 10:50 AM   Result Value Ref Range    Sodium 135 135 - 145 mmol/L    Potassium 3.4 (L) 3.6 - 5.5 mmol/L    Chloride 101 96 - 112 mmol/L    Co2 22 20 - 33 mmol/L    Anion Gap 12.0 (H) 0.0 - 11.9    Glucose 86 65 - 99 mg/dL    Bun 8 8 - 22 mg/dL    Creatinine 0.87 0.50 - 1.40 mg/dL    Calcium 9.0 8.5 - 10.5 mg/dL    AST(SGOT) 14 12 - 45 U/L    ALT(SGPT) 9 2 - 50 U/L    Alkaline Phosphatase 105 (H) 30 - 99 U/L    Total Bilirubin 0.3 0.1 - 1.5 mg/dL    Albumin 3.7 3.2 - 4.9 g/dL    Total Protein 7.3 6.0 - 8.2 g/dL    Globulin 3.6 (H) 1.9 - 3.5 g/dL    A-G Ratio 1.0 g/dL   URIC ACID    Collection Time: 01/21/20 10:50 AM   Result Value Ref Range    Uric Acid 6.6 1.9 - 8.2 mg/dL   ESTIMATED GFR    Collection Time: 01/21/20 10:50 AM   Result Value Ref Range    GFR If African American >60 >60 mL/min/1.73 m 2    GFR If Non African American >60 >60 mL/min/1.73 m 2   SERIUM MAGNESIUM LEVEL 2 HRS AFTER LOADING DOSE INFUSED    Collection Time: 01/21/20  3:17 PM   Result Value Ref Range    Magnesium 5.5 (HH) 1.5 - 2.5 mg/dL   SERUM MAGNESIUM LEVELS EVERY 6 HRS UNTIL DESIRED RANGE (5-8 MG/DL) ACHIEVED    Collection Time: 01/21/20  9:07 PM   Result Value Ref Range    Magnesium 6.8 (HH) 1.5 - 2.5 mg/dL   SERUM MAGNESIUM LEVELS DAILY X 3 DAYS, STARTING AFTER  DESIRED MAGNESIUM LEVEL ACHIEVED    Collection Time: 01/22/20  8:48 AM   Result Value Ref Range    Magnesium 7.0 (HH) 1.5 - 2.5 mg/dL       Ass:   36w0d IOL for severe preeclampsia, currently on magnesium sulfate  BP's normal with only 1 elevated at 135/91  S/p prostaglandin gel, mosoprostol x 2, CRB and now pitocin  Labor State: Early latent labor.  GBS unknown treated adequately with penicillin    P.   1. AROM performed easily for large amount of clear fluid  2. Contine pitocin to maintain adequate MVUs  3. Reassess 2-3 hours or PRN.    WILLIE Eid Dr attending MD

## 2020-01-23 NOTE — PROGRESS NOTES
1900- report received from ANNETTE Juarez RN pt currently sleeping. Did not disturb  2030- Zofran given See MAR    2145- K. Stori at bedside for balloon removal. SVE 3-4/70/ballotable. POC to keep increasing pitocin, recheck at 0000 for potential AROM. Pt will be repositioned. Every 30 minutes.   0000- Janie Stori at bedside for SVE 3-4/80/-2. AROM cl. IUPC inserted.   0400-Janie Stori at bedside for   SVE 3-4/90/-2.  0500- Pitocin turned off for 30 minutes per KJesús Stori. Will restart at 10 miliunits/hr  0529- Pitocin restarted See MAR   0700- report given to BLAYNE Marin RN

## 2020-01-23 NOTE — CARE PLAN
Problem: Pain  Goal: Patient will have relaxed facial expressions and be able to rest between uterine contractions  Outcome: PROGRESSING AS EXPECTED     Problem: Risk for Infection, Impaired Wound Healing  Goal: Remain free from signs and symptoms of infection  Outcome: PROGRESSING AS EXPECTED     Problem: Safety  Goal: Will remain free from injury  Outcome: PROGRESSING AS EXPECTED     Problem: Infection  Goal: Will remain free from infection  Outcome: PROGRESSING AS EXPECTED

## 2020-01-23 NOTE — PROGRESS NOTES
Marejuhi Eason   36w1d    Subjective: Pt sleeping between position changes    uterine contractions:yes, pain: .no  nausea/vomiting: .noepigastric pain:.no:      fetal movement: normal, vaginal bleeding: .no    Objective:   Vitals:    01/23/20 0100 01/23/20 0130 01/23/20 0200 01/23/20 0300   BP: 113/59 129/77 135/82 130/74   Pulse: 87 80 87 95   Resp:       Temp:  37 °C (98.6 °F)     TempSrc:  Temporal     SpO2: 97% 94% 93% 96%   Weight:       Height:         Fetal heart variability: moderate  Fetal Heart Rate decelerations:     Fetal Heart Rate accelerations: yes  Baseline FHR: 120 per minute  Contractions: ineffective pattern, coupling and tripling, -200  Cervical Exam 3-4/80/-2  Fetal position: Cephalic  Membranes: ruptured: .yes  AROM: .yes, Meconium: .no  IUPC: .yes, FSE .no    Meds:     Epidural : .yes  Magnesium sulfate: .yes  Pitocin: .yes at 16 mu/min    Labs:    Lab:   Recent Results (from the past 48 hour(s))   PROTEIN/CREAT RATIO URINE    Collection Time: 01/21/20 10:35 AM   Result Value Ref Range    Total Protein, Urine 35.0 (H) 0.0 - 15.0 mg/dL    Creatinine, Random Urine 101.70 mg/dL    Protein Creatinine Ratio 344 (H) 10 - 107 mg/g   POC UA    Collection Time: 01/21/20 10:45 AM   Result Value Ref Range    POC Color Yellow     POC Appearance Clear     POC Glucose Negative Negative mg/dL    POC Ketones >=160 (A) Negative mg/dL    POC Specific Gravity 1.015 1.005 - 1.030    POC Blood Negative Negative    POC Urine PH 6.5 5.0 - 8.0    POC Protein 30 (A) Negative mg/dL    POC Nitrites Negative Negative    POC Leukocyte Esterase Trace (A) Negative   CBC WITH DIFFERENTIAL    Collection Time: 01/21/20 10:50 AM   Result Value Ref Range    WBC 7.0 4.8 - 10.8 K/uL    RBC 4.21 4.20 - 5.40 M/uL    Hemoglobin 11.8 (L) 12.0 - 16.0 g/dL    Hematocrit 36.6 (L) 37.0 - 47.0 %    MCV 86.9 81.4 - 97.8 fL    MCH 28.0 27.0 - 33.0 pg    MCHC 32.2 (L) 33.6 - 35.0 g/dL    RDW 41.7 35.9 - 50.0 fL     Platelet Count 155 (L) 164 - 446 K/uL    MPV 11.2 9.0 - 12.9 fL    Neutrophils-Polys 74.90 (H) 44.00 - 72.00 %    Lymphocytes 17.40 (L) 22.00 - 41.00 %    Monocytes 6.40 0.00 - 13.40 %    Eosinophils 0.10 0.00 - 6.90 %    Basophils 0.10 0.00 - 1.80 %    Immature Granulocytes 1.10 (H) 0.00 - 0.90 %    Nucleated RBC 0.00 /100 WBC    Neutrophils (Absolute) 5.26 2.00 - 7.15 K/uL    Lymphs (Absolute) 1.22 1.00 - 4.80 K/uL    Monos (Absolute) 0.45 0.00 - 0.85 K/uL    Eos (Absolute) 0.01 0.00 - 0.51 K/uL    Baso (Absolute) 0.01 0.00 - 0.12 K/uL    Immature Granulocytes (abs) 0.08 0.00 - 0.11 K/uL    NRBC (Absolute) 0.00 K/uL   Comp Metabolic Panel    Collection Time: 01/21/20 10:50 AM   Result Value Ref Range    Sodium 135 135 - 145 mmol/L    Potassium 3.4 (L) 3.6 - 5.5 mmol/L    Chloride 101 96 - 112 mmol/L    Co2 22 20 - 33 mmol/L    Anion Gap 12.0 (H) 0.0 - 11.9    Glucose 86 65 - 99 mg/dL    Bun 8 8 - 22 mg/dL    Creatinine 0.87 0.50 - 1.40 mg/dL    Calcium 9.0 8.5 - 10.5 mg/dL    AST(SGOT) 14 12 - 45 U/L    ALT(SGPT) 9 2 - 50 U/L    Alkaline Phosphatase 105 (H) 30 - 99 U/L    Total Bilirubin 0.3 0.1 - 1.5 mg/dL    Albumin 3.7 3.2 - 4.9 g/dL    Total Protein 7.3 6.0 - 8.2 g/dL    Globulin 3.6 (H) 1.9 - 3.5 g/dL    A-G Ratio 1.0 g/dL   URIC ACID    Collection Time: 01/21/20 10:50 AM   Result Value Ref Range    Uric Acid 6.6 1.9 - 8.2 mg/dL   ESTIMATED GFR    Collection Time: 01/21/20 10:50 AM   Result Value Ref Range    GFR If African American >60 >60 mL/min/1.73 m 2    GFR If Non African American >60 >60 mL/min/1.73 m 2   SERIUM MAGNESIUM LEVEL 2 HRS AFTER LOADING DOSE INFUSED    Collection Time: 01/21/20  3:17 PM   Result Value Ref Range    Magnesium 5.5 (HH) 1.5 - 2.5 mg/dL   SERUM MAGNESIUM LEVELS EVERY 6 HRS UNTIL DESIRED RANGE (5-8 MG/DL) ACHIEVED    Collection Time: 01/21/20  9:07 PM   Result Value Ref Range    Magnesium 6.8 (HH) 1.5 - 2.5 mg/dL   SERUM MAGNESIUM LEVELS DAILY X 3 DAYS, STARTING AFTER DESIRED  MAGNESIUM LEVEL ACHIEVED    Collection Time: 01/22/20  8:48 AM   Result Value Ref Range    Magnesium 7.0 (HH) 1.5 - 2.5 mg/dL       Ass:   36w1d IOL for severe preeclampsia  S/p gel, miso and cook now on pitocin  Labor State: Early latent labor.  GBS unknown adequately tx with penicillin    P.   1. Report to Dr Taylor  2. Will continue with pitocin augmentation  3. Reassess in 2-4 hours or PRN    WILLIE Eid Dr attending MD

## 2020-01-23 NOTE — PROGRESS NOTES
Labor Progress Note:      S:  Pt reports doing well.  Denies HA/abd pain/vision changes.  Comfortable w/ epidural.    Vitals:    20 0746 20 0807 20 0826 20 0846   BP: 144/87 159/102 139/91 142/93   Pulse:  71 72 84   Resp:       Temp:       TempSrc:       SpO2: 99%  100% 99%   Weight:       Height:           SVE: deferred; 3-/-2 @ 0730    FHT: 120/ variability/mod variability/no decels  toco: q2-3 ctx, MVUs  Approximately 180      A/P: 19 y.o.  @ 36w1d admitted for IOL for preE w/ severe features  - labor: pt still latent, s/p cervical ripening and now on pit and s/p AROM around midnight last night.  Pit currently @ 18mUnits, cont to titrate  - preE with severe features by Bps.  Serum labs wnl on arrival, will repeat now   Excellent UOP, pt asx    - FHT: cat I  - GBS: unknown, on PCN  - treated for CT in October w/ neg test of reinfection in Nov  -  Rh +/-, RI      Gianna Temple MD  [facility], [facility]        1630 Addendum:  Late entry secondary to pt care.  Pt seen around 1440.  Uncomfortable with ctx. Otherwise no complaints    Patient Vitals for the past 4 hrs:   BP Pulse SpO2   20 1600 121/76 88 96 %   20 1526 127/81 86 98 %   20 1508 142/86 83 --   20 1429 154/91 84 --   20 1400 (!) 166/103 80 100 %   20 1330 158/96 72 100 %   20 1300 (!) 161/94 72 100 %     Repeat SVE     FHT cat I  MVUs 160, still inadequate    UOP >100cc    Cont pit; prolonged latent phase.  Ruptured since around midnight (16hrs), if without change at next assessment will recommend c/s for failed induction.      Gianna Temple MD  [facility], [facility]      181 Addendum:  Repeat SVE unchanged; .      Discussed lack of change despite injection started approximately 2 and half days ago.  Has been ruptured now for 18 hours, on Pitocin for greater than 30 hours without entry into active phase.  Recommend primary   delivery no for failed induction of labor.  Discussed that the maternal and fetal status currently is reassuring and is not an emergency, but that the longer she stays at this dilation the less likely it is we will be able to get her into labor and the greater her surgical risk would be if and when she does end up needing a .    Pt initially wants some time to consider, would like to call and discuss with her mother.  On return to the room, patient reports she does desire a primary  delivery.  Her mother is on the way and she would like to wait until her mother arrives to start.  I discussed w/ pt risks of surgery including pain, bleeding, infection, risk of damage to intraabdominal structures including bowel/bladder/ureters.  Pt confirms she is accepting of blood transfusion in case of emergency.  Reviewed risks of transfusion including transfusion reaction (fever, damage to heart/lungs/kidneys), risk of exposure to infectious disease including HIV and hepatitis.  All questions answered.  Consent signed      Gianna Temple MD  [facility], [facility]

## 2020-01-24 LAB
ALBUMIN SERPL BCP-MCNC: 2.9 G/DL (ref 3.2–4.9)
ALBUMIN SERPL BCP-MCNC: 3.2 G/DL (ref 3.2–4.9)
ALBUMIN/GLOB SERPL: 1 G/DL
ALBUMIN/GLOB SERPL: 1 G/DL
ALP SERPL-CCNC: 104 U/L (ref 30–99)
ALP SERPL-CCNC: 93 U/L (ref 30–99)
ALT SERPL-CCNC: 10 U/L (ref 2–50)
ALT SERPL-CCNC: 11 U/L (ref 2–50)
ANION GAP SERPL CALC-SCNC: 13 MMOL/L (ref 0–11.9)
ANION GAP SERPL CALC-SCNC: 8 MMOL/L (ref 0–11.9)
AST SERPL-CCNC: 19 U/L (ref 12–45)
AST SERPL-CCNC: 21 U/L (ref 12–45)
BILIRUB SERPL-MCNC: 0.2 MG/DL (ref 0.1–1.5)
BILIRUB SERPL-MCNC: 0.2 MG/DL (ref 0.1–1.5)
BUN SERPL-MCNC: 7 MG/DL (ref 8–22)
BUN SERPL-MCNC: 8 MG/DL (ref 8–22)
CALCIUM SERPL-MCNC: 7.6 MG/DL (ref 8.5–10.5)
CALCIUM SERPL-MCNC: 8.3 MG/DL (ref 8.5–10.5)
CHLORIDE SERPL-SCNC: 100 MMOL/L (ref 96–112)
CHLORIDE SERPL-SCNC: 95 MMOL/L (ref 96–112)
CO2 SERPL-SCNC: 25 MMOL/L (ref 20–33)
CO2 SERPL-SCNC: 30 MMOL/L (ref 20–33)
CREAT SERPL-MCNC: 0.75 MG/DL (ref 0.5–1.4)
CREAT SERPL-MCNC: 0.87 MG/DL (ref 0.5–1.4)
ERYTHROCYTE [DISTWIDTH] IN BLOOD BY AUTOMATED COUNT: 42.3 FL (ref 35.9–50)
GLOBULIN SER CALC-MCNC: 3 G/DL (ref 1.9–3.5)
GLOBULIN SER CALC-MCNC: 3.1 G/DL (ref 1.9–3.5)
GLUCOSE SERPL-MCNC: 100 MG/DL (ref 65–99)
GLUCOSE SERPL-MCNC: 139 MG/DL (ref 65–99)
HCT VFR BLD AUTO: 35.3 % (ref 37–47)
HGB BLD-MCNC: 11.7 G/DL (ref 12–16)
MAGNESIUM SERPL-MCNC: 6.3 MG/DL (ref 1.5–2.5)
MAGNESIUM SERPL-MCNC: 6.9 MG/DL (ref 1.5–2.5)
MCH RBC QN AUTO: 28.7 PG (ref 27–33)
MCHC RBC AUTO-ENTMCNC: 33.1 G/DL (ref 33.6–35)
MCV RBC AUTO: 86.5 FL (ref 81.4–97.8)
PLATELET # BLD AUTO: 167 K/UL (ref 164–446)
PMV BLD AUTO: 11.3 FL (ref 9–12.9)
POTASSIUM SERPL-SCNC: 3.7 MMOL/L (ref 3.6–5.5)
POTASSIUM SERPL-SCNC: 3.9 MMOL/L (ref 3.6–5.5)
PROT SERPL-MCNC: 5.9 G/DL (ref 6–8.2)
PROT SERPL-MCNC: 6.3 G/DL (ref 6–8.2)
RBC # BLD AUTO: 4.08 M/UL (ref 4.2–5.4)
SODIUM SERPL-SCNC: 133 MMOL/L (ref 135–145)
SODIUM SERPL-SCNC: 138 MMOL/L (ref 135–145)
WBC # BLD AUTO: 15 K/UL (ref 4.8–10.8)

## 2020-01-24 PROCEDURE — 85027 COMPLETE CBC AUTOMATED: CPT

## 2020-01-24 PROCEDURE — 700102 HCHG RX REV CODE 250 W/ 637 OVERRIDE(OP): Performed by: ANESTHESIOLOGY

## 2020-01-24 PROCEDURE — 700111 HCHG RX REV CODE 636 W/ 250 OVERRIDE (IP): Performed by: OBSTETRICS & GYNECOLOGY

## 2020-01-24 PROCEDURE — 59514 CESAREAN DELIVERY ONLY: CPT

## 2020-01-24 PROCEDURE — 83735 ASSAY OF MAGNESIUM: CPT

## 2020-01-24 PROCEDURE — 700102 HCHG RX REV CODE 250 W/ 637 OVERRIDE(OP): Performed by: OBSTETRICS & GYNECOLOGY

## 2020-01-24 PROCEDURE — 700105 HCHG RX REV CODE 258: Performed by: OBSTETRICS & GYNECOLOGY

## 2020-01-24 PROCEDURE — A9270 NON-COVERED ITEM OR SERVICE: HCPCS | Performed by: ANESTHESIOLOGY

## 2020-01-24 PROCEDURE — 770002 HCHG ROOM/CARE - OB PRIVATE (112)

## 2020-01-24 PROCEDURE — 80053 COMPREHEN METABOLIC PANEL: CPT

## 2020-01-24 PROCEDURE — A9270 NON-COVERED ITEM OR SERVICE: HCPCS | Performed by: OBSTETRICS & GYNECOLOGY

## 2020-01-24 PROCEDURE — 36415 COLL VENOUS BLD VENIPUNCTURE: CPT

## 2020-01-24 PROCEDURE — 700111 HCHG RX REV CODE 636 W/ 250 OVERRIDE (IP): Performed by: STUDENT IN AN ORGANIZED HEALTH CARE EDUCATION/TRAINING PROGRAM

## 2020-01-24 PROCEDURE — 303615 HCHG EPIDURAL/SPINAL ANESTHESIA FOR LABOR

## 2020-01-24 RX ORDER — HYDRALAZINE HYDROCHLORIDE 20 MG/ML
5 INJECTION INTRAMUSCULAR; INTRAVENOUS ONCE
Status: COMPLETED | OUTPATIENT
Start: 2020-01-24 | End: 2020-01-24

## 2020-01-24 RX ORDER — NIFEDIPINE 10 MG/1
10 CAPSULE ORAL ONCE
Status: COMPLETED | OUTPATIENT
Start: 2020-01-24 | End: 2020-01-24

## 2020-01-24 RX ORDER — NIFEDIPINE 30 MG/1
30 TABLET, EXTENDED RELEASE ORAL
Status: DISCONTINUED | OUTPATIENT
Start: 2020-01-24 | End: 2020-01-26 | Stop reason: HOSPADM

## 2020-01-24 RX ADMIN — NIFEDIPINE 10 MG: 10 CAPSULE ORAL at 00:19

## 2020-01-24 RX ADMIN — ACETAMINOPHEN 1000 MG: 500 TABLET, FILM COATED ORAL at 14:24

## 2020-01-24 RX ADMIN — KETOROLAC TROMETHAMINE 30 MG: 30 INJECTION, SOLUTION INTRAMUSCULAR at 18:11

## 2020-01-24 RX ADMIN — ONDANSETRON 4 MG: 4 TABLET, ORALLY DISINTEGRATING ORAL at 01:55

## 2020-01-24 RX ADMIN — KETOROLAC TROMETHAMINE 30 MG: 30 INJECTION, SOLUTION INTRAMUSCULAR at 01:21

## 2020-01-24 RX ADMIN — KETOROLAC TROMETHAMINE 30 MG: 30 INJECTION, SOLUTION INTRAMUSCULAR at 12:04

## 2020-01-24 RX ADMIN — HYDRALAZINE HYDROCHLORIDE 5 MG: 20 INJECTION INTRAMUSCULAR; INTRAVENOUS at 01:18

## 2020-01-24 RX ADMIN — OXYCODONE HYDROCHLORIDE 10 MG: 10 TABLET ORAL at 01:09

## 2020-01-24 RX ADMIN — ACETAMINOPHEN 1000 MG: 500 TABLET, FILM COATED ORAL at 09:30

## 2020-01-24 RX ADMIN — DIPHENHYDRAMINE HYDROCHLORIDE 25 MG: 50 INJECTION INTRAMUSCULAR; INTRAVENOUS at 01:55

## 2020-01-24 RX ADMIN — MAGNESIUM SULFATE IN WATER 2 G/HR: 40 INJECTION, SOLUTION INTRAVENOUS at 16:15

## 2020-01-24 RX ADMIN — ACETAMINOPHEN 1000 MG: 500 TABLET, FILM COATED ORAL at 00:20

## 2020-01-24 RX ADMIN — NIFEDIPINE 30 MG: 30 TABLET, FILM COATED, EXTENDED RELEASE ORAL at 06:14

## 2020-01-24 RX ADMIN — HYDRALAZINE HYDROCHLORIDE 5 MG: 20 INJECTION INTRAMUSCULAR; INTRAVENOUS at 00:47

## 2020-01-24 RX ADMIN — SODIUM CHLORIDE, POTASSIUM CHLORIDE, SODIUM LACTATE AND CALCIUM CHLORIDE: 600; 310; 30; 20 INJECTION, SOLUTION INTRAVENOUS at 12:25

## 2020-01-24 RX ADMIN — NIFEDIPINE 10 MG: 10 CAPSULE ORAL at 06:52

## 2020-01-24 RX ADMIN — KETOROLAC TROMETHAMINE 30 MG: 30 INJECTION, SOLUTION INTRAMUSCULAR at 06:15

## 2020-01-24 NOTE — CONSULTS
Per MD's note, MOB does not want to breast feed.  Diet order for infant reads Similac formula only.

## 2020-01-24 NOTE — PROGRESS NOTES
On call note :   19 y.o.  , female , S/P C/S at 36.1 weeks for Failed IOL. Patient with Elevated BP: with severe Features .   PAtient now POD # 1 , with need for post op BP cpontrol , via IV meds . Patient now on PO meds , and feels well   Past Medical History:   Diagnosis Date   • Head ache    • Physiological ovarian cysts 2015     Patient Active Problem List    Diagnosis Date Noted   • Encounter for supervision of normal pregnancy in second trimester 10/04/2019     Priority: High   • Excessive weight gain during pregnancy in second trimester 2019     Priority: Medium     OB History    Para Term  AB Living   1 1 0 1 0 1   SAB TAB Ectopic Molar Multiple Live Births   0 0 0 0 0 1       Current Facility-Administered Medications:   •  NIFEdipine SR (PROCARDIA-XL) tablet 30 mg, 30 mg, Oral, Q DAY, Gianna Temple M.D., 30 mg at 20 0614  •  acetaminophen (TYLENOL) tablet 650 mg, 650 mg, Oral, Q4HRS PRN, Nabeel Weems D.O.  •  ondansetron (ZOFRAN ODT) dispertab 4 mg, 4 mg, Oral, Q4HRS PRN, Stewart Olsen M.D., 4 mg at 20 0155  •  simethicone (MYLICON) chewable tab 80 mg, 80 mg, Oral, TID PRN, Stewart Olsen M.D., 80 mg at 20 1426  •  acetaminophen (TYLENOL) tablet 1,000 mg, 1,000 mg, Oral, Q6HR, Clay Patel M.D., 1,000 mg at 20 1424  •  oxyCODONE immediate-release (ROXICODONE) tablet 5 mg, 5 mg, Oral, Q4HRS PRN, Clay Patel M.D.  •  magnesium sulfate 40 g/1000mL infusion, 2 g/hr, Intravenous, Continuous, Gianna Temple M.D., Last Rate: 50 mL/hr at 20, 2 g/hr at 20  •  docusate sodium (COLACE) capsule 100 mg, 100 mg, Oral, BID PRN, Gianna Temple M.D.  •  bisacodyl (DULCOLAX) suppository 10 mg, 10 mg, Rectal, PRN, Gianna Temple M.D.  •  magnesium hydroxide (MILK OF MAGNESIA) suspension 30 mL, 30 mL, Oral, Q6HRS PRN, Gianna Temple M.D.  •  prenatal plus vitamin (STUARTNATAL 1+1) 27-1 MG tablet 1 Tab,  1 Tab, Oral, QAM, Gianna Temple M.D.  •  ketorolac (TORADOL) injection 30 mg, 30 mg, Intravenous, Q6HRS, 30 mg at 01/24/20 1204 **OR** ketorolac (TORADOL) injection 30 mg, 30 mg, Intramuscular, Q6HRS, Gianna Temple M.D.  •  oxyCODONE immediate-release (ROXICODONE) tablet 5 mg, 5 mg, Oral, Q4HRS PRN, Gianna Temple M.D.  •  oxyCODONE immediate release (ROXICODONE) tablet 10 mg, 10 mg, Oral, Q4HRS PRN, Gianna Temple M.D.  •  diphenhydrAMINE (BENADRYL) tablet/capsule 25 mg, 25 mg, Oral, Q6HRS PRN **OR** diphenhydrAMINE (BENADRYL) injection 25 mg, 25 mg, Intravenous, Q6HRS PRN, Gianna Temple M.D., 25 mg at 01/24/20 0155  •  oxytocin (PITOCIN) 20 UNITS/1000ML LR (postpartum),  mL/hr, Intravenous, Continuous, Angeles Negro, C.N.M., Last Rate: 125 mL/hr at 01/23/20 2122, 125 mL/hr at 01/23/20 2122  •  [DISCONTINUED] fentaNYL (SUBLIMAZE) injection 100 mcg, 100 mcg, Intravenous, Q HOUR PRN, 100 mcg at 01/23/20 2119 **OR** fentaNYL (SUBLIMAZE) injection 50 mcg, 50 mcg, Intravenous, Q HOUR PRN, Dennis Marcos, C.N.M.  •  labetalol (NORMODYNE/TRANDATE) injection 20-80 mg, 20-80 mg, Intravenous, PRN, 20 mg at 01/21/20 2331 **OR** hydrALAZINE (APRESOLINE) injection 5-10 mg, 5-10 mg, Intravenous, PRN, Nabeel Weems, D.O.  •  lactated ringers infusion, , Intravenous, Continuous, Nabeel Weems, D.O., Last Rate: 75 mL/hr at 01/24/20 1225  •  ondansetron (ZOFRAN) syringe/vial injection 4 mg, 4 mg, Intravenous, Q6HRS PRN, Nabeel Weems D.O., 4 mg at 01/22/20 2033  •  calcium GLUConate injection 1 g, 1 g, Intravenous, Once PRN, Nabeel Weems D.O.      Vitals:    01/24/20 0800 01/24/20 0900 01/24/20 1034 01/24/20 1200   BP: 132/72 139/79 135/94    Pulse: 90 (!) 104 69    Resp:    17   Temp:    36.6 °C (97.8 °F)   TempSrc:    Temporal   SpO2: 99% 99%     Weight:       Height:         I/O last 3 completed shifts:  In: 2587.5 [I.V.:2387.5]  Out: 19672 [Urine:84074]    Alert , oriented ,  NAD  Abd: soft , bandage dry , fundus firm   + BS   Ext : min- Trace Edema     Labs :   Last labs at midnite     Ass:   POD  # 1 , C/S for Failed IOL  Severe BP elevations   On continuous Magnesium     P.   Check CMP   D/C magnesium , this PM

## 2020-01-24 NOTE — ANESTHESIA POSTPROCEDURE EVALUATION
Patient: Mare Eason    Procedure Summary     Date:  20 Room / Location:  [facility] OR 02 / LABOR AND DELIVERY    Anesthesia Start:  1430 Anesthesia Stop:      Procedure:   SECTION, PRIMARY (N/A Abdomen) Diagnosis:       Failure to progress in labor      (same delivered)    Surgeon:  Gianna Temple M.D. Responsible Provider:  Clay Patel M.D.    Anesthesia Type:  epidural ASA Status:  3 - Emergent          Final Anesthesia Type: epidural  Last vitals  BP   Blood Pressure: 157/104    Temp   36.8 °C (98.3 °F)    Pulse   Pulse: (!) 103   Resp   18    SpO2   98 %      Anesthesia Post Evaluation    Patient location during evaluation: PACU  Patient participation: complete - patient participated  Level of consciousness: awake and alert  Pain score: 1    Airway patency: patent  Anesthetic complications: no  Cardiovascular status: hemodynamically stable  Respiratory status: acceptable  Hydration status: euvolemic    PONV: none           Nurse Pain Score: 5 (NPRS)

## 2020-01-24 NOTE — PROGRESS NOTES
2150- Ania Garner notifed. Pt has low HR and /110> STAT EKG ordered and recent   2207- EKG tech at bedside   2217- 10 mg nifedipine given per MD order  2250- 10 more mg of nifedipine ordered.> med held per MD  current /87.

## 2020-01-24 NOTE — ANESTHESIA POSTPROCEDURE EVALUATION
Patient: Mare Eason    Procedure Summary     Date:  20 Room / Location:  [facility] OR 02 / LABOR AND DELIVERY    Anesthesia Start:  1430 Anesthesia Stop:      Procedure:   SECTION, PRIMARY (N/A Abdomen) Diagnosis:       Failure to progress in labor      (same delivered)    Surgeon:  Gianna Temple M.D. Responsible Provider:  Clay Patel M.D.    Anesthesia Type:  epidural ASA Status:  3 - Emergent          Final Anesthesia Type: epidural  Last vitals  BP   Blood Pressure: 157/104    Temp   36.8 °C (98.3 °F)    Pulse   Pulse: (!) 103   Resp   18    SpO2   98 %      Anesthesia Post Evaluation    Patient location during evaluation: PACU  Patient participation: complete - patient participated  Level of consciousness: awake and alert  Pain score: 0    Airway patency: patent  Anesthetic complications: no  Cardiovascular status: hemodynamically stable  Respiratory status: acceptable  Hydration status: euvolemic    PONV: none           Nurse Pain Score: 5 (NPRS)

## 2020-01-24 NOTE — ANESTHESIA TIME REPORT
Anesthesia Start and Stop Event Times     Date Time Event    2020 1430 Ready for Procedure     1430 Anesthesia Start    2020 Anesthesia Stop        Responsible Staff  20 to 20    Name Role Begin End    Jose Henry M.D. Anesth 1430 0649    Clay Patel M.D. Anesth 0649         Preop Diagnosis (Free Text):  Pre-op Diagnosis     Failure to progress        Preop Diagnosis (Codes):  Diagnosis Information     Diagnosis Code(s): Failure to progress in labor [O62.2]        Post op Diagnosis  Status post primary low transverse  section      Premium Reason  A. 3PM - 7AM    Comments: Elaine 20 14:30 - 06:46 on 20 then RENU 20 06:46-19:24, then c section E, 19:24-20:30

## 2020-01-24 NOTE — PROGRESS NOTES
0040) Assumed care of patient at this time    0047) Prn hydralazine administered per MD order    0118) Report to Dr. Garner concerning patient's BP, repeat 5 mg dose of hydralazine ordered by MD, orders verified and administered per MD order.     0153) Critical magnesium result of 6.3 received from Lab, critical lab level read back to Eneida to confirm, report to Dr. Garner to update.     0630) Dr. Temple in to see patient at bedside    0700) Report to JEAN-CLAUDE Oliveira RN, POC discussed

## 2020-01-24 NOTE — ANESTHESIA QCDR
2019 [facility] Clinical Data Registry (for Quality Improvement)     Postoperative nausea/vomiting risk protocol (Adult = 18 yrs and Pediatric 3-17 yrs)- (430 and 463)  General inhalation anesthetic (NOT TIVA) with PONV risk factors: Yes  Provision of anti-emetic therapy with at least 2 different classes of agents: Yes   Patient DID NOT receive anti-emetic therapy and reason is documented in Medical Record:  N/A    Multimodal Pain Management- (477)  Non-emergent surgery AND patient age >= 18:   Use of Multimodal Pain Management, two or more drugs and/or interventions, NOT including systemic opioids:   Exception: Documented allergy to multiple classes of analgesics:     Smoking Abstinence (404)  Patient is current smoker (cigarette, pipe, e-cig, marijuanna):   Elective Surgery:   Abstinence instructions provided prior to day of surgery:   Patient abstained from smoking on day of surgery:     Pre-Op Beta-Blocker in Isolated CABG (44)  Isolated CABG AND patient age >= 18:   Beta-blocker admin within 24 hours of surgical incision:   Exception:of medical reason(s) for not administering beta blocker within 24 hours prior to surgical incision (e.g., not  indicated,other medical reason):     PACU assessment of acute postoperative pain prior to Anesthesia Care End- Applies to Patients Age = 18- (ABG7)  Initial PACU pain score is which of the following: < 7/10  Patient unable to report pain score: N/A    Post-anesthetic transfer of care checklist/protocol to PACU/ICU- (426 and 427)  Upon conclusion of case, patient transferred to which of the following locations: PACU/Non-ICU  Use of transfer checklist/protocol:   Exclusion: Service Performed in Patient Hospital Room (and thus did not require transfer):   Unplanned admission to ICU related to anesthesia service up through end of PACU care- (MD51)  Unplanned admission to ICU (not initially anticipated at anesthesia start time): No

## 2020-01-24 NOTE — PROGRESS NOTES
PostPartum day #1:    Subjective:   Pt reports feeling well, pain is tolerable with pain medication.  Pt hs not yet ambulated.  Has Ribera catheter in place.  Denies headache, vision changes, right upper quadrant pain.  Is tolerating clears and feels very hungry.  Does not want to breast-feed.  Has not yet been able to see baby.  Minimal VB.    24hr VS:  Temp:  [36.2 °C (97.1 °F)-37.4 °C (99.3 °F)] 36.2 °C (97.1 °F)  Pulse:  [] 73  Resp:  [16] 16  BP: ()/() 165/102  SpO2:  [96 %-100 %] 97 %        Intake/Output Summary (Last 24 hours) at 1/24/2020 0652  Last data filed at 1/24/2020 0600  Gross per 24 hour   Intake 500 ml   Output 03573 ml   Net -9500 ml     UOP this AM >100cc/hr    gen: AAO, NAD  CV: RRR  Resp: clear to auscultation bilaterally  Abd: soft, ND, appropriately tender to palpation, no rebound/guarding, +BS; fundus palpable below umbilcus   Incision: Dressing in place, clean/dry/intact,  Ext: NT, tr edema bilaterally, 2+DTRs    Meds:   Current Facility-Administered Medications:   •  NIFEdipine SR (PROCARDIA-XL) tablet 30 mg, 30 mg, Oral, Q DAY, Gianna Temple M.D., 30 mg at 01/24/20 0614  •  NIFEdipine IR (PROCARDIA) capsule 10 mg, 10 mg, Oral, Once, Gianna Temple M.D.  •  acetaminophen (TYLENOL) tablet 650 mg, 650 mg, Oral, Q4HRS PRN, Nabeel Weems D.O.  •  ondansetron (ZOFRAN ODT) dispertab 4 mg, 4 mg, Oral, Q4HRS PRN, Stewart Olsen M.D., 4 mg at 01/24/20 0155  •  simethicone (MYLICON) chewable tab 80 mg, 80 mg, Oral, TID PRN, Stewart Olsen M.D., 80 mg at 01/23/20 1426  •  acetaminophen (TYLENOL) tablet 1,000 mg, 1,000 mg, Oral, Q6HR, Clay Patel M.D., Stopped at 01/24/20 0315  •  oxyCODONE immediate-release (ROXICODONE) tablet 5 mg, 5 mg, Oral, Q4HRS PRN, Clay Patel M.D.  •  magnesium sulfate 40 g/1000mL infusion, 2 g/hr, Intravenous, Continuous, Gianna Temple M.D., Last Rate: 50 mL/hr at 01/23/20 2105, 2 g/hr at 01/23/20 2105  •  docusate  sodium (COLACE) capsule 100 mg, 100 mg, Oral, BID PRN, Gianna Temple M.D.  •  bisacodyl (DULCOLAX) suppository 10 mg, 10 mg, Rectal, PRN, Gianna Temple M.D.  •  magnesium hydroxide (MILK OF MAGNESIA) suspension 30 mL, 30 mL, Oral, Q6HRS PRN, Gianna Temple M.D.  •  prenatal plus vitamin (STUARTNATAL 1+1) 27-1 MG tablet 1 Tab, 1 Tab, Oral, QAM, Gianna Temple M.D.  •  ketorolac (TORADOL) injection 30 mg, 30 mg, Intravenous, Q6HRS, 30 mg at 01/24/20 0615 **OR** ketorolac (TORADOL) injection 30 mg, 30 mg, Intramuscular, Q6HRS, Gianna Temple M.D.  •  oxyCODONE immediate-release (ROXICODONE) tablet 5 mg, 5 mg, Oral, Q4HRS PRN, Gianna Temple M.D.  •  oxyCODONE immediate release (ROXICODONE) tablet 10 mg, 10 mg, Oral, Q4HRS PRN, Gianna Temple M.D.  •  diphenhydrAMINE (BENADRYL) tablet/capsule 25 mg, 25 mg, Oral, Q6HRS PRN **OR** diphenhydrAMINE (BENADRYL) injection 25 mg, 25 mg, Intravenous, Q6HRS PRN, Gianna Temple M.D., 25 mg at 01/24/20 0155  •  oxytocin (PITOCIN) 20 UNITS/1000ML LR (postpartum),  mL/hr, Intravenous, Continuous, Angeles Negro C.N.M., Last Rate: 125 mL/hr at 01/23/20 2122, 125 mL/hr at 01/23/20 2122  •  [DISCONTINUED] fentaNYL (SUBLIMAZE) injection 100 mcg, 100 mcg, Intravenous, Q HOUR PRN, 100 mcg at 01/23/20 2119 **OR** fentaNYL (SUBLIMAZE) injection 50 mcg, 50 mcg, Intravenous, Q HOUR PRN, Carrissia Marcos, C.N.MJesús  •  labetalol (NORMODYNE/TRANDATE) injection 20-80 mg, 20-80 mg, Intravenous, PRN, 20 mg at 01/21/20 2331 **OR** hydrALAZINE (APRESOLINE) injection 5-10 mg, 5-10 mg, Intravenous, PRN, Nabeel Weems, D.O.  •  lactated ringers infusion, , Intravenous, Continuous, Nabeel Weems, D.O., Last Rate: 75 mL/hr at 01/23/20 0854  •  ondansetron (ZOFRAN) syringe/vial injection 4 mg, 4 mg, Intravenous, Q6HRS PRN, Nabeel Weems, D.O., 4 mg at 01/22/20 2033  •  calcium GLUConate injection 1 g, 1 g, Intravenous, Once PRN, Nabeel Weems,  D.O.      Lab:   Results for MASOOD RIVERS (MRN [id number]) as of 2020 06:20   Ref. Range 2020 00:44   Sodium Latest Ref Range: 135 - 145 mmol/L 138   Potassium Latest Ref Range: 3.6 - 5.5 mmol/L 3.7   Chloride Latest Ref Range: 96 - 112 mmol/L 100   Co2 Latest Ref Range: 20 - 33 mmol/L 25   Anion Gap Latest Ref Range: 0.0 - 11.9  13.0 (H)   Glucose Latest Ref Range: 65 - 99 mg/dL 139 (H)   Bun Latest Ref Range: 8 - 22 mg/dL 7 (L)   Creatinine Latest Ref Range: 0.50 - 1.40 mg/dL 0.87   GFR If  Latest Ref Range: >60 mL/min/1.73 m 2 >60   GFR If Non  Latest Ref Range: >60 mL/min/1.73 m 2 >60   Calcium Latest Ref Range: 8.5 - 10.5 mg/dL 8.3 (L)   AST(SGOT) Latest Ref Range: 12 - 45 U/L 21   ALT(SGPT) Latest Ref Range: 2 - 50 U/L 10   Alkaline Phosphatase Latest Ref Range: 30 - 99 U/L 104 (H)   Total Bilirubin Latest Ref Range: 0.1 - 1.5 mg/dL 0.2   Albumin Latest Ref Range: 3.2 - 4.9 g/dL 3.2   Total Protein Latest Ref Range: 6.0 - 8.2 g/dL 6.3   Globulin Latest Ref Range: 1.9 - 3.5 g/dL 3.1   A-G Ratio Latest Units: g/dL 1.0   Magnesium Latest Ref Range: 1.5 - 2.5 mg/dL 6.3 (HH)     Results for MASOOD RIVERS (MRN [id number]) as of 2020 06:20   Ref. Range 2020 00:44   WBC Latest Ref Range: 4.8 - 10.8 K/uL 15.0 (H)   RBC Latest Ref Range: 4.20 - 5.40 M/uL 4.08 (L)   Hemoglobin Latest Ref Range: 12.0 - 16.0 g/dL 11.7 (L)   Hematocrit Latest Ref Range: 37.0 - 47.0 % 35.3 (L)   MCV Latest Ref Range: 81.4 - 97.8 fL 86.5   MCH Latest Ref Range: 27.0 - 33.0 pg 28.7   MCHC Latest Ref Range: 33.6 - 35.0 g/dL 33.1 (L)   RDW Latest Ref Range: 35.9 - 50.0 fL 42.3   Platelet Count Latest Ref Range: 164 - 446 K/uL 167     A/P: 19 y.o.  POD 1 s/p primary LTCS  delivery @ 36w1d for failed IOL for preE with severe features.  - Doing well postpartum, awaiting return of bowel function as well as ambulation and spontaneous void after magnesium  sulfate discontinued this evening.  Postop hemoglobin 11.7  - preE w/ SF by Bps.  S/p IV antihypertensives initially on admission then again after c/s overnight required 10mg IR nifediping (additional 10 given then immediately vomited back up), 5mg IV hydralazine x2, started on procardia 30XL daily.  Additional 10mg IR nifedipine now for persistently severe Bps, may need to uptitrate [id number] (just got first dose approx 30min ago)   Serum labs wnl, repeated this AM   Mag sulfate x24hrs postpartum, due off today @ 1945  - infant: in  nursery; does not desire to BF  - Rh+/RI  - contraception: undecided  - ppx: SCDs      dispo: cont postop care, anticipate transfer to postpartum if blood pressure stabilizes    Gianna Temple MD  [facility], Women's Health

## 2020-01-24 NOTE — PROGRESS NOTES
0700- report received from Real HIGGINS, POC discussed  0730- infant brought to room, bands verified  0745- assessments completed on pt and infant. Pt instructed to call RN with any needs or questions  0945- pt assisted with bed bath. Pt stood at side of bed with RN, pt stable on feet.  1900- report to Gerhard HIGGINS

## 2020-01-24 NOTE — OR SURGEON
Operative Report:  20      PreOp Diagnosis:   1. SIUP @ 36w1d  2. Pre-eclampsia with severe features  3. Failed induction of labor    PostOp Diagnosis:   1.  Pre-eclampsia with severe features  2. S/p primary low transverse  delivery    Procedure(s):   SECTION, PRIMARY - Wound Class: Clean Contaminated    Surgeon(s):  Gianna Temple M.D.    Indication: 20yo G1 admitted @ 35w6d for IOL secondary to preeclampsia with severe features.  The patient underwent cervical ripening followed by Pitocin for greater than 30 hours and rupture of membranes for greater than 18 hours without progression to active phase of labor.  The patient was taken for primary  delivery for failed induction of labor.      Anesthesiologist/Type of Anesthesia:  Anesthesiologist: Jose Henry M.D.; Clay Patel M.D./Spinal    Surgical Staff:  Circulator: Kristen Reese R.N.  Scrub Person: Ludmilajenniffer Romeroes  First Assist: Angeles Negro C.N.M.  L&D Baby  Nurse: Cris aCstanon R.N.    Specimens removed if any  * No specimens in log *    Estimated Blood Loss: 800cc  IVF: 1000cc   UOP: 250cc    Findings: normal uterus, tubes, ovaries.  Vigorous female infant, vertex presentation.  Apgars 8 and 8, weight 2145g.    Complications: none    Principal Procedure As Follows:  The pt ws taken to the operating room where epidural anesthesia was found to be adequate.  The patient was prepped and draped in a normal supine position with a wedge under the right hip.  A pfannensteil incision was made and taken down to the fascia with the scalpel, which was incised bilaterally with the scalpel.  The fascial incision was extended laterally with the Chaves scissors.  The rectus muscles were dissected off the rectus sheath with Chaves scissors superiorly and inferiorly.  There was separation of the rectus sheath superiorly and the peritoneum was entered bluntly, extended with stretching.  The bladder blade was placed.   The lower uterine segment was incised transversely and the endometrial cavity entered with a blunt finger.  The hysterotomy was extended with cephalad-caudad stretching.  The infant's head was manually elevated to the level of the hysterotomy and delivered, followed atraumatically by the anterior shoulder, posterior shoulder and body with help of fundal pressure.  The infant was stimulated, bulb suctioned, and cord clamping was delayed x30 seconds then the cord was clamped x2, cut and the infant handed to nurse.  Cord gases were sent.  The placenta delivered with gentle cord traction and uterine massage and was noted to be intact with 3-vessel cord. The uterus was exteriorized, cleared of all clots and debris and closed in running locked fashion with 0 chromic.  There was an inferior extension noted on the right side which was also closed with a running lock fashion with hemostasis noted.  Additional figure-of-eight was required to the left of midline. The uterus was returned to the abdomen, the gutters cleared of all clots and debris and the hysterotomy re-examined and noted to be hemostatic.  The rectus muscles were inspected, found to be hemostatic.  The fascia was closed with 0 Vicryl.  The subcutaneous tissues was irrigated. Bleeders were coagulated with the bovie.  The subcutaneous tissues were approximated with running 2-0 plain and the skin with subcuticular suture with 4-0 monocryl.  Dermabond was placed, let dry and followed by pressure dressing.  All counts were correct x3.  The uterus was expressed with some additional clots found, no active bleeding noted however there was concern for mild lower uterine segment atony for which 0.25mg was given IM prior to leaving the OR.  Pt went to recovery in good condition.  Infant went to  nursery.      Kayleen Temple M.D.

## 2020-01-24 NOTE — ANESTHESIA PREPROCEDURE EVALUATION
Relevant Problems   No relevant active problems       Physical Exam    Airway   Mallampati: II  TM distance: >3 FB  Neck ROM: full       Cardiovascular - normal exam  Rhythm: regular  Rate: normal  (-) murmur     Dental - normal exam         Pulmonary - normal exam  Breath sounds clear to auscultation     Abdominal    Neurological - normal exam                 Anesthesia Plan    ASA 2- EMERGENT   ASA physical status emergent criteria: non-reassuring fetal heart tones    Plan - epidural                 Postoperative Plan: Postoperative administration of opioids is intended.    Pertinent diagnostic labs and testing reviewed    Informed Consent:    Anesthetic plan and risks discussed with patient.

## 2020-01-25 PROCEDURE — A9270 NON-COVERED ITEM OR SERVICE: HCPCS | Performed by: OBSTETRICS & GYNECOLOGY

## 2020-01-25 PROCEDURE — A9270 NON-COVERED ITEM OR SERVICE: HCPCS | Performed by: STUDENT IN AN ORGANIZED HEALTH CARE EDUCATION/TRAINING PROGRAM

## 2020-01-25 PROCEDURE — 770002 HCHG ROOM/CARE - OB PRIVATE (112)

## 2020-01-25 PROCEDURE — 700102 HCHG RX REV CODE 250 W/ 637 OVERRIDE(OP): Performed by: STUDENT IN AN ORGANIZED HEALTH CARE EDUCATION/TRAINING PROGRAM

## 2020-01-25 PROCEDURE — 700102 HCHG RX REV CODE 250 W/ 637 OVERRIDE(OP): Performed by: OBSTETRICS & GYNECOLOGY

## 2020-01-25 PROCEDURE — 700112 HCHG RX REV CODE 229: Performed by: OBSTETRICS & GYNECOLOGY

## 2020-01-25 RX ORDER — NIFEDIPINE 10 MG/1
10 CAPSULE ORAL ONCE
Status: COMPLETED | OUTPATIENT
Start: 2020-01-25 | End: 2020-01-25

## 2020-01-25 RX ORDER — LABETALOL 100 MG/1
200 TABLET, FILM COATED ORAL TWICE DAILY
Status: DISCONTINUED | OUTPATIENT
Start: 2020-01-25 | End: 2020-01-26 | Stop reason: HOSPADM

## 2020-01-25 RX ORDER — IBUPROFEN 200 MG
950 CAPSULE ORAL DAILY
Status: DISCONTINUED | OUTPATIENT
Start: 2020-01-25 | End: 2020-01-26 | Stop reason: HOSPADM

## 2020-01-25 RX ADMIN — ACETAMINOPHEN 650 MG: 325 TABLET, FILM COATED ORAL at 15:21

## 2020-01-25 RX ADMIN — OXYCODONE HYDROCHLORIDE 5 MG: 5 TABLET ORAL at 06:07

## 2020-01-25 RX ADMIN — CALCIUM CITRATE 200 MG (950 MG) TABLET 950 MG: at 15:21

## 2020-01-25 RX ADMIN — OXYCODONE HYDROCHLORIDE 5 MG: 5 TABLET ORAL at 21:02

## 2020-01-25 RX ADMIN — ACETAMINOPHEN 650 MG: 325 TABLET, FILM COATED ORAL at 21:02

## 2020-01-25 RX ADMIN — VITAMIN A, VITAMIN C, VITAMIN D-3, VITAMIN E, VITAMIN B-1, VITAMIN B-2, NIACIN, VITAMIN B-6, CALCIUM, IRON, ZINC, COPPER 1 TABLET: 4000; 120; 400; 22; 1.84; 3; 20; 10; 1; 12; 200; 27; 25; 2 TABLET ORAL at 06:07

## 2020-01-25 RX ADMIN — NIFEDIPINE 30 MG: 30 TABLET, FILM COATED, EXTENDED RELEASE ORAL at 06:07

## 2020-01-25 RX ADMIN — NIFEDIPINE 10 MG: 10 CAPSULE ORAL at 06:58

## 2020-01-25 RX ADMIN — DOCUSATE SODIUM 100 MG: 100 CAPSULE, LIQUID FILLED ORAL at 06:07

## 2020-01-25 RX ADMIN — LABETALOL HYDROCHLORIDE 200 MG: 100 TABLET, FILM COATED ORAL at 21:02

## 2020-01-25 RX ADMIN — LABETALOL HYDROCHLORIDE 200 MG: 100 TABLET, FILM COATED ORAL at 08:23

## 2020-01-25 ASSESSMENT — EDINBURGH POSTNATAL DEPRESSION SCALE (EPDS)
I HAVE FELT SAD OR MISERABLE: NO, NOT AT ALL
I HAVE BEEN ANXIOUS OR WORRIED FOR NO GOOD REASON: NO, NOT AT ALL
THE THOUGHT OF HARMING MYSELF HAS OCCURRED TO ME: NEVER
I HAVE BLAMED MYSELF UNNECESSARILY WHEN THINGS WENT WRONG: NO, NEVER
I HAVE BEEN ABLE TO LAUGH AND SEE THE FUNNY SIDE OF THINGS: AS MUCH AS I ALWAYS COULD
I HAVE FELT SCARED OR PANICKY FOR NO GOOD REASON: NO, NOT AT ALL
THINGS HAVE BEEN GETTING ON TOP OF ME: NO, I HAVE BEEN COPING AS WELL AS EVER
I HAVE BEEN SO UNHAPPY THAT I HAVE HAD DIFFICULTY SLEEPING: NOT AT ALL
I HAVE LOOKED FORWARD WITH ENJOYMENT TO THINGS: AS MUCH AS I EVER DID
I HAVE BEEN SO UNHAPPY THAT I HAVE BEEN CRYING: NO, NEVER

## 2020-01-25 NOTE — PROGRESS NOTES
"Name:   Mare Eason   Date/Time:  1/25/2020 7:08 AM  Gestational Age:  36w1d  Admit Date:   1/21/2020  Admitting Dx:   Pregnancy  Indication for care in labor or delivery    POD # 2 from C/S for failed IOL secondary to preeclampsia with severe features. Patient has been off magnesium since 1900 on 1/24.    Subjective:   Abdominal pain yes   Ambulating   yes  Tolerating PO  yes  Flatus    yes  Bleeding   yes  Voiding   yes   Dizziness   no  Breast feeding  no  Breast tenderness  no    BP (!) 166/102   Pulse 80   Temp 37.1 °C (98.8 °F) (Temporal)   Resp 17   Ht 1.575 m (5' 2.01\")   Wt 69.9 kg (154 lb)   SpO2 98%   Breast Exam: Tenderness .no, Engourgement .yes, Mastitis .no  Abdomen soft, non-tender. BS normal. No masses,  No organomegaly  Incisionno evidence of infection, separation or keloid formation.  Fundus Tenderness:no, Below umbilicus:Yes, 1cm below  Perineumperineum intact  Extremitiesno cyanosis, clubbing, no edema    Meds:   No current facility-administered medications on file prior to encounter.      Current Outpatient Medications on File Prior to Encounter   Medication Sig Dispense Refill   • ondansetron (ZOFRAN ODT) 4 MG TABLET DISPERSIBLE Take 1 Tab by mouth every 8 hours as needed for Nausea. 10 Tab 1   • metoclopramide (REGLAN) 10 MG Tab 1 tab po at hs, and 1 tab po 30 minutes prior to meals (Patient not taking: Reported on 1/9/2020) 30 Tab 1   • ondansetron (ZOFRAN ODT) 4 MG TABLET DISPERSIBLE Take 1 Tab by mouth every 8 hours as needed. 10 Tab 0   • Prenatal MV-Min-Fe Fum-FA-DHA (PRENATAL 1 PO) Take  by mouth.         Lab results:  Calcium: 7.6  AST: 19  ALT: 11      Assessment and Plan  good candidate for Nexplanon   PPD#2    # Preeclampsia:  Patient has been off Mag since 1900 yesterday. Had labile high pressures this AM. Was given 10 Nifedipine IR.  - Procardia 30 XL daily  - Labetalol 200 BID  - Will monitor closely    # Hypocalcemia:  - Calcitrate 950 daily    No areas of " skin breakdown/redness; surgical incision intact/healing, Taking adequate diet and fluids, No heavy bleeding or foul vaginal discharge, voiding without difficulty     Continue routine postpartum care, encourage ambulation, pain control, anticipate D/C home PPD#3-4.    Case was discussed with the attending physician, Dr. Durán.     Stweart Olsen M.D.

## 2020-01-25 NOTE — PROGRESS NOTES
Patient up from L&D. Report received from GISELA Riggs. Bands verified. Cuddles tag on and flashing. Whiteboards updated, POC discussed. Declines pain at this time and will call when needing pain medication. Patient oriented to unit and room. Call light and emergency call light use discussed. Call light within reach. Patient encouraged to call with any needs and or concerns.

## 2020-01-25 NOTE — DISCHARGE PLANNING
Discharge Planning Assessment Post Partum     Reason for Referral: History of THC.  Infant is negative.  Address: [address] [place] [address]. [address] [address]  Phone: [phone]  Type of Living Situation: living with FOB and mother  Mom Diagnosis: Pregnancy,   Baby Diagnosis:   Primary Language: English     Name of Baby: Omar Eason (: 20)  Father of the Baby: Elbert Eason   Involved in baby’s care? Yes  Contact Information: same as MOB's number: [phone]     Prenatal Care: Yes  Mom's PCP: None  PCP for new baby: Pediatrician list provided     Support System: FOB  Coping/Bonding between mother & baby: Yes  Source of Feeding: bottle feeding  Supplies for Infant: prepared for infant     Mom's Insurance: Medicaid  Baby Covered on Insurance:Yes  Mother Employed/School: Not currently  Other children in the home/names & ages: none, 1st baby     Financial Hardship/Income: denies   Mom's Mental status: alert and oriented  Services used prior to admit: Medicaid and WIC     CPS History: No  Psychiatric History: No  Domestic Violence History: No  Drug/ETOH History: history of marijuana use-MOB stated she stopped using when she became pregnant.  Infant's UDS is negative.     Resources Provided: pediatrician list, children and family resource list, and post partum support resources were provided  Referrals Made: diaper bank referral provided      Clearance for Discharge: Infant is cleared to discharge home with parents.

## 2020-01-25 NOTE — PROGRESS NOTES
1900 Report received from JEAN-CLAUDE Oliveira RN at bedside and assumed care. POC discussed with pt and s/o and encouraged to state needs or questions at any time.    1910 Dr. Durán given update, orders received to DC magnesium and transfer pt to PP.    2050 Pt assisted up to the bathroom and voided.    2100 Pt transferred to PP via wheelchair. Infant transferred via crib cart by father alongside RN. Pt and infant stable.    2105 Report given to Ajit HIGGINS at bedside. Bands verified and cuddles active.

## 2020-01-25 NOTE — CARE PLAN
Problem: Altered physiologic condition related to postoperative  delivery  Goal: Patient physiologically stable as evidenced by normal lochia, palpable uterine involution and vital signs within normal limits  Outcome: PROGRESSING AS EXPECTED  Note:   Some elevated pressure noted. Other VSS. Fundus firm with minimal lochia noted. Patient educated on when to pull emergency call light.      Problem: Potential for postpartum infection related to surgical incision, compromised uterine condition, urinary tract or respiratory compromise  Goal: Patient will be afebrile and free from signs and symptoms of infection  Outcome: PROGRESSING AS EXPECTED  Note:   Patient afebrile. No s/sx of infection. Incision NASIM, well approximated. No redness or drainage noted.

## 2020-01-26 PROCEDURE — 700102 HCHG RX REV CODE 250 W/ 637 OVERRIDE(OP): Performed by: OBSTETRICS & GYNECOLOGY

## 2020-01-26 PROCEDURE — A9270 NON-COVERED ITEM OR SERVICE: HCPCS | Performed by: OBSTETRICS & GYNECOLOGY

## 2020-01-26 PROCEDURE — 700111 HCHG RX REV CODE 636 W/ 250 OVERRIDE (IP): Performed by: NURSE PRACTITIONER

## 2020-01-26 PROCEDURE — 90715 TDAP VACCINE 7 YRS/> IM: CPT | Performed by: NURSE PRACTITIONER

## 2020-01-26 PROCEDURE — A9270 NON-COVERED ITEM OR SERVICE: HCPCS | Performed by: STUDENT IN AN ORGANIZED HEALTH CARE EDUCATION/TRAINING PROGRAM

## 2020-01-26 PROCEDURE — 700102 HCHG RX REV CODE 250 W/ 637 OVERRIDE(OP): Performed by: STUDENT IN AN ORGANIZED HEALTH CARE EDUCATION/TRAINING PROGRAM

## 2020-01-26 PROCEDURE — 700111 HCHG RX REV CODE 636 W/ 250 OVERRIDE (IP): Performed by: STUDENT IN AN ORGANIZED HEALTH CARE EDUCATION/TRAINING PROGRAM

## 2020-01-26 PROCEDURE — 90471 IMMUNIZATION ADMIN: CPT

## 2020-01-26 RX ORDER — LABETALOL 200 MG/1
200 TABLET, FILM COATED ORAL 2 TIMES DAILY
Qty: 60 TAB | Refills: 1 | Status: SHIPPED | OUTPATIENT
Start: 2020-01-26 | End: 2020-03-19

## 2020-01-26 RX ORDER — ACETAMINOPHEN 325 MG/1
650 TABLET ORAL EVERY 4 HOURS PRN
Qty: 30 TAB | Refills: 0 | COMMUNITY
Start: 2020-01-26 | End: 2023-04-07 | Stop reason: CLARIF

## 2020-01-26 RX ORDER — OXYCODONE HYDROCHLORIDE 5 MG/1
5 TABLET ORAL EVERY 4 HOURS PRN
Qty: 20 TAB | Refills: 0 | Status: SHIPPED | OUTPATIENT
Start: 2020-01-26 | End: 2020-02-02

## 2020-01-26 RX ORDER — PSEUDOEPHEDRINE HCL 30 MG
100 TABLET ORAL 2 TIMES DAILY PRN
Qty: 60 CAP | Refills: 1 | Status: SHIPPED | OUTPATIENT
Start: 2020-01-26 | End: 2020-03-19

## 2020-01-26 RX ORDER — MEDROXYPROGESTERONE ACETATE 150 MG/ML
150 INJECTION, SUSPENSION INTRAMUSCULAR ONCE
Status: COMPLETED | OUTPATIENT
Start: 2020-01-26 | End: 2020-01-26

## 2020-01-26 RX ORDER — NIFEDIPINE 30 MG
30 TABLET, EXTENDED RELEASE ORAL DAILY
Qty: 30 TAB | Refills: 1 | Status: SHIPPED | OUTPATIENT
Start: 2020-01-27 | End: 2020-03-19

## 2020-01-26 RX ORDER — ACETAMINOPHEN 325 MG/1
650 TABLET ORAL EVERY 4 HOURS PRN
Status: DISCONTINUED | OUTPATIENT
Start: 2020-01-26 | End: 2020-01-26 | Stop reason: HOSPADM

## 2020-01-26 RX ADMIN — OXYCODONE HYDROCHLORIDE 10 MG: 10 TABLET ORAL at 04:35

## 2020-01-26 RX ADMIN — VITAMIN A, VITAMIN C, VITAMIN D-3, VITAMIN E, VITAMIN B-1, VITAMIN B-2, NIACIN, VITAMIN B-6, CALCIUM, IRON, ZINC, COPPER 1 TABLET: 4000; 120; 400; 22; 1.84; 3; 20; 10; 1; 12; 200; 27; 25; 2 TABLET ORAL at 05:38

## 2020-01-26 RX ADMIN — MEDROXYPROGESTERONE ACETATE 150 MG: 150 INJECTION, SUSPENSION, EXTENDED RELEASE INTRAMUSCULAR at 09:06

## 2020-01-26 RX ADMIN — CALCIUM CITRATE 200 MG (950 MG) TABLET 950 MG: at 05:38

## 2020-01-26 RX ADMIN — LABETALOL HYDROCHLORIDE 200 MG: 100 TABLET, FILM COATED ORAL at 08:31

## 2020-01-26 RX ADMIN — NIFEDIPINE 30 MG: 30 TABLET, FILM COATED, EXTENDED RELEASE ORAL at 05:39

## 2020-01-26 RX ADMIN — ACETAMINOPHEN 650 MG: 325 TABLET, FILM COATED ORAL at 05:42

## 2020-01-26 RX ADMIN — TETANUS TOXOID, REDUCED DIPHTHERIA TOXOID AND ACELLULAR PERTUSSIS VACCINE, ADSORBED 0.5 ML: 5; 2.5; 8; 8; 2.5 SUSPENSION INTRAMUSCULAR at 09:22

## 2020-01-26 NOTE — CARE PLAN
Problem: Alteration in comfort related to surgical incision and/or after birth pains  Goal: Patient is able to ambulate, care for self and infant with acceptable pain level  Intervention: Administer pain meds as requested by patient and ordered by MD/DO/CNM  Note:   Pain medicine given as requested

## 2020-01-26 NOTE — DISCHARGE SUMMARY
Discharge Summary:      Mare Eason      Admit Date:   2020  Discharge Date:  2020     Admitting diagnosis:  Pregnancy  Indication for care in labor or delivery  Discharge Diagnosis: Status post  for failure of induction of labor for Pre-eclampsia with severe features.  Pregnancy Complications: preeclampsia with severe features  Tubal Ligation:  no        History:  Past Medical History:   Diagnosis Date   • Head ache    • Physiological ovarian cysts 2015     OB History    Para Term  AB Living   1 1   1   1   SAB TAB Ectopic Molar Multiple Live Births           0 1      # Outcome Date GA Lbr Adán/2nd Weight Sex Delivery Anes PTL Lv   1  20 36w1d  2.145 kg (4 lb 11.7 oz) F CS-LTranv EPI, Spinal Y GUILHERME        Patient has no known allergies.  Patient Active Problem List    Diagnosis Date Noted   • Encounter for supervision of normal pregnancy in second trimester 10/04/2019     Priority: High   • Excessive weight gain during pregnancy in second trimester 2019     Priority: Medium        Hospital Course:   19 y.o. , now para 1, was admitted with the above mentioned diagnosis, underwent Induction of Labor,  for failure of induction secondary to preeclampsia with severe features. Patient postpartum course was complicated by preeclampsia and patient was on Magnesium. Patient was started on Procardia and labetalol with good pressure control. Patient had  progressive advancement in diet , ambulation and toleration of oral analgesia. Patient without complaints today and desires discharge. BP stable for >24 hours. Will have close follow up for BP check at C.    Vitals:    20 2101 20 2200 20 0200 20 0600   BP: 136/95 123/65 142/98 144/90   Pulse:  84 77 78   Resp:  16 18 18   Temp:  36.4 °C (97.6 °F) 37.1 °C (98.7 °F) 36.6 °C (97.8 °F)   TempSrc:  Temporal Temporal Temporal   SpO2:  96% 99% 97%   Weight:       Height:            Current Facility-Administered Medications   Medication Dose   • tetanus-dipth-acell pertussis (Tdap) inj 0.5 mL  0.5 mL   • acetaminophen (TYLENOL) tablet 650 mg  650 mg   • labetalol (NORMODYNE) tablet 200 mg  200 mg   • calcium citrate (CALCITRATE) tablet 950 mg  950 mg   • NIFEdipine SR (PROCARDIA-XL) tablet 30 mg  30 mg   • ondansetron (ZOFRAN ODT) dispertab 4 mg  4 mg   • simethicone (MYLICON) chewable tab 80 mg  80 mg   • docusate sodium (COLACE) capsule 100 mg  100 mg   • bisacodyl (DULCOLAX) suppository 10 mg  10 mg   • magnesium hydroxide (MILK OF MAGNESIA) suspension 30 mL  30 mL   • prenatal plus vitamin (STUARTNATAL 1+1) 27-1 MG tablet 1 Tab  1 Tab   • oxyCODONE immediate-release (ROXICODONE) tablet 5 mg  5 mg   • oxyCODONE immediate release (ROXICODONE) tablet 10 mg  10 mg   • diphenhydrAMINE (BENADRYL) tablet/capsule 25 mg  25 mg    Or   • diphenhydrAMINE (BENADRYL) injection 25 mg  25 mg   • oxytocin (PITOCIN) 20 UNITS/1000ML LR (postpartum)   mL/hr   • fentaNYL (SUBLIMAZE) injection 50 mcg  50 mcg       Exam:  General: No acute distress, lying comfortably in bed  HEENT: moist mucus membranes  Cardiovascular: RRR, no murmurs  Respiratory: Clear to auscultation bilaterally  Abdomen: Abdomen soft, non-tender. BS normal. No masses,  No organomegaly  Fundus Non Tender: yes, 1cm below umbicus  Incision: no evidence of infection, separation or keloid formation.  Extremity: extremities, peripheral pulses and reflexes normal, no edema, redness or tenderness in the calves or thighs     Labs:  Recent Labs     01/23/20  0913 01/24/20  0044   WBC 13.6* 15.0*   RBC 4.20 4.08*   HEMOGLOBIN 11.9* 11.7*   HEMATOCRIT 37.5 35.3*   MCV 89.3 86.5   MCH 28.3 28.7   MCHC 31.7* 33.1*   RDW 44.9 42.3   PLATELETCT 180 167   MPV 11.7 11.3        Activity:   Discharge to home  Pelvic Rest x 6 weeks    Assessment:  Post partum course complicated by preeclampsia with severe range pressures. Patient was on Magnesium.  BP control was established with procardia and labetalol. She will follow up with [facility] for BP check this week.  Good candidate for Nexplanon. Will receive Depo prior to discharge.   Discharge Assessment: No areas of skin breakdown/redness; surgical incision intact/healing, No heavy bleeding or foul vaginal discharge , Voiding without difficulty     Follow up: .[facility] 1 week for incision check. [facility] this week for BP check.    To resume daily PNV and iron supplement if needed with hydration.   Patient to RT [facility] or ER if any of the following occur:  Fever over 100.5  Severe abdominal pain  Red streaks or painful masses in the breasts  Foul smelling discharge or lochia  Heavy vaginal bleeding saturating a pad per hour  S/s of PP depression     Discharge Meds:   Current Outpatient Medications   Medication Sig Dispense Refill   • acetaminophen (TYLENOL) 325 MG Tab Take 2 Tabs by mouth every four hours as needed. 30 Tab 0   • docusate sodium 100 MG Cap Take 100 mg by mouth 2 times a day as needed for Constipation. 60 Cap 1   • labetalol (NORMODYNE) 200 MG Tab Take 1 Tab by mouth 2 Times a Day. 60 Tab 1   • [START ON 1/27/2020] NIFEdipine SR (ADALAT CC) 30 MG CR tablet Take 1 Tab by mouth every day. 30 Tab 1   • oxyCODONE immediate-release (ROXICODONE) 5 MG Tab Take 1 Tab by mouth every four hours as needed for up to 7 days. 20 Tab 0     Case discussed with attending physician, Dr. Salazar.    Stewart Olsen M.D.

## 2020-01-26 NOTE — PROGRESS NOTES
1930 Received awake on bed, Assessment done incision clean and dry, Pt denies pain at this time, Encourage more ambulation, Needs attended.

## 2020-01-26 NOTE — DISCHARGE INSTRUCTIONS
POSTPARTUM DISCHARGE INSTRUCTIONS FOR MOM    YOB: 2000   Age: 19 y.o.               Admit Date: 2020     Discharge Date: 2020  Attending Doctor:  Nabeel Weems D.O.                  Allergies:  Patient has no known allergies.    Discharged to home by car. Discharged via wheelchair, hospital escort: Yes.  Special equipment needed: Not Applicable  Belongings with: Personal  Be sure to schedule a follow-up appointment with your primary care doctor or any specialists as instructed.     Discharge Plan:   Diet Plan: Discussed  Activity Level: Discussed  Confirmed Follow up Appointment: Patient to Call and Schedule Appointment  Confirmed Symptoms Management: Discussed  Medication Reconciliation Updated: Yes  Influenza Vaccine Indication: Not indicated: Previously immunized this influenza season and > 8 years of age    REASONS TO CALL YOUR OBSTETRICIAN:  1.   Persistent fever or shaking chills (Temperature higher than 100.4)  2.   Heavy bleeding (soaking more than 1 pad per hour); Passing clots  3.   Foul odor from vagina  4.   Mastitis (Breast infection; breast pain, chills, fever, redness)  5.   Urinary pain, burning or frequency  6.   Episiotomy infection  7.   Abdominal incision infection  8.   Severe depression longer than 24 hours    HAND WASHING  · Prior to handling the baby.  · Before breastfeeding or bottle feeding baby.  · After using the bathroom or changing the baby's diaper.    WOUND CARE  Ask your physician for additional care instructions.  In general:    ·  Incision:      · Keep clean and dry.    · Do NOT lift anything heavier than your baby for up to 6 weeks.    · There should not be any opening or pus.      VAGINAL CARE  · Nothing inside vagina for 6 weeks: no sexual intercourse, tampons or douching.  · Bleeding may continue for 2-4 weeks.  Amount may vary.    · Call your physician for heavy bleeding which means soaking more than 1 pad per hour    BIRTH CONTROL  · It is  "possible to become pregnant at any time after delivery and while breastfeeding.  · Plan to discuss a method of birth control with your physician at your follow up visit. visit.    DIET AND ELIMINATION  · Eating more fiber (bran cereal, fruits, and vegetables) and drinking plenty of fluids will help to avoid constipation.  · Urinary frequency after childbirth is normal.    POSTPARTUM BLUES  During the first few days after birth, you may experience a sense of the \"blues\" which may include impatience, irritability or even crying.  These feeling come and go quickly.  However, as many as 1 in 10 women experience emotional symptoms known as postpartum depression.    Postpartum depression:  May start as early as the second or third day after delivery or take several weeks or months to develop.  Symptoms of \"blues\" are present, but are more intense:  Crying spells; loss of appetite; feelings of hopelessness or loss of control; fear of touching the baby; over concern or no concern at all about the baby; little or no concern about your own appearance/caring for yourself; and/or inability to sleep or excessive sleeping.  Contact your physician if you are experiencing any of these symptoms.    Crisis Hotline:  · [place] Crisis Hotline:  7-438-SKQGIKD  Or 1-204.986.2203  · [place] Crisis Hotline:  1-504.187.9035  Or 174-949-3404    PREVENTING SHAKEN BABY:  If you are angry or stressed, PUT THE BABY IN THE CRIB, step away, take some deep breaths, and wait until you are calm to care for the baby.  DO NOT SHAKE THE BABY.  You are not alone, call a supporter for help.    · Crisis [facility] Center 24/7 crisis line 415-909-2479 or 1-628.227.1410  · You can also text them, text \"ANSWER\" to 667785    QUIT SMOKING/TOBACCO USE:  I understand the use of any tobacco products increases my chance of suffering from future heart disease and could cause other illnesses which may shorten my life. Quitting the use of tobacco products is the single most " important thing I can do to improve my health. For further information on smoking / tobacco cessation call a Toll Free Quit Line at 1-313.132.8224 (*National Cancer [place]) or 1-355.694.8593 (American Lung Association) or you can access the web based program at www.lungusa.org.    · [place] Tobacco Users Help Line:  (300) 870-5657       Toll Free: 1-978.108.4663  · Quit Tobacco Program [facility] (894)221-8424    DEPRESSION / SUICIDE RISK:  As you are discharged from this [facility], it is important to learn how to keep safe from harming yourself.    Recognize the warning signs:  · Abrupt changes in personality, positive or negative- including increase in energy   · Giving away possessions  · Change in eating patterns- significant weight changes-  positive or negative  · Change in sleeping patterns- unable to sleep or sleeping all the time   · Unwillingness or inability to communicate  · Depression  · Unusual sadness, discouragement and loneliness  · Talk of wanting to die  · Neglect of personal appearance   · Rebelliousness- reckless behavior  · Withdrawal from people/activities they love  · Confusion- inability to concentrate     If you or a loved one observes any of these behaviors or has concerns about self-harm, here's what you can do:  · Talk about it- your feelings and reasons for harming yourself  · Remove any means that you might use to hurt yourself (examples: pills, rope, extension cords, firearm)  · Get professional help from the community (Mental Health, Substance Abuse, psychological counseling)  · Do not be alone:Call your Safe Contact- someone whom you trust who will be there for you.  · Call your local CRISIS HOTLINE 118-2555 or 424-782-5563  · Call your local Children's Mobile Crisis Response Team [place] (714) 151-1403 or www.[employer] Online Scheduling  · Call the toll free National Suicide Prevention Hotlines   · National Suicide Prevention Lifeline 149-044-MLHX  (8368)  · [facility] 800-SUICIDE (741-0875)    DISCHARGE SURVEY:  Thank you for choosing [facility].  We hope we provided you with very good care.  You may be receiving a survey in the mail.  Please fill it out.  Your opinion is valuable to us.    ADDITIONAL EDUCATIONAL MATERIALS GIVEN TO PATIENT:        My signature on this form indicates that:  1.  I have reviewed and understand the above information  2.  My questions regarding this information have been answered to my satisfaction.  3.  I have formulated a plan with my discharge nurse to obtain my prescribed medication for home.  Please follow up at [facility] this week for BP check.  Please follow up with [facility] 1 week after delivery for incision check.    Medications:  Labetalol in the morning and at night  (blood pressure medication)  Procardia in the morning (blood pressure medication)  Docusate sodium in the morning and at night (stool softener)  Ibuprofen every 6 hours as needed for pain.  Oxycodone every 6 hours as needed for severe pain.    Strict pelvic rest for 6 weeks, this means no sexual intercourse, no tampon use, no vaginal douching. Nothing in the vagina until cleared by physician at 6 week postpartum visit.   Please walk daily as pain and comfort permit.

## 2020-01-26 NOTE — CARE PLAN
Problem: Potential for postpartum infection related to surgical incision, compromised uterine condition, urinary tract or respiratory compromise  Goal: Patient will be afebrile and free from signs and symptoms of infection  Note:   Patient is free from any signs or symptoms of infection.      Problem: Potential knowledge deficit related to lack of understanding of self and  care  Goal: Patient will demonstrate ability to care for self and infant  Note:   Patient demonstrates ability to care for self and infant.

## 2020-01-26 NOTE — CARE PLAN
Problem: Altered physiologic condition related to postoperative  delivery  Goal: Patient physiologically stable as evidenced by normal lochia, palpable uterine involution and vital signs within normal limits  Intervention: Massage fundus as necessary to prevent excessive lochia  Note:   Fundal massage done with scant bleeding

## 2020-01-27 VITALS
RESPIRATION RATE: 18 BRPM | HEIGHT: 62 IN | DIASTOLIC BLOOD PRESSURE: 90 MMHG | HEART RATE: 78 BPM | WEIGHT: 154 LBS | OXYGEN SATURATION: 97 % | TEMPERATURE: 97.8 F | BODY MASS INDEX: 28.34 KG/M2 | SYSTOLIC BLOOD PRESSURE: 144 MMHG

## 2020-03-19 ENCOUNTER — POST PARTUM (OUTPATIENT)
Dept: OBGYN | Facility: CLINIC | Age: 20
End: 2020-03-19
Payer: MEDICAID

## 2020-03-19 DIAGNOSIS — Z87.59 HISTORY OF PREGNANCY INDUCED HYPERTENSION: ICD-10-CM

## 2020-03-19 DIAGNOSIS — R53.83 FATIGUE, UNSPECIFIED TYPE: ICD-10-CM

## 2020-03-19 DIAGNOSIS — Z30.09 ENCOUNTER FOR OTHER GENERAL COUNSELING OR ADVICE ON CONTRACEPTION: ICD-10-CM

## 2020-03-19 DIAGNOSIS — Z98.891 HISTORY OF CESAREAN SECTION, LOW TRANSVERSE: ICD-10-CM

## 2020-03-19 PROCEDURE — 0503F POSTPARTUM CARE VISIT: CPT | Performed by: ADVANCED PRACTICE MIDWIFE

## 2020-03-19 NOTE — PROGRESS NOTES
Subjective   Subjective:    Mare Eason is a 19 y.o. female who presents for her postpartum exam 5 weeks LTCS. Her prenatal course was complicated by preeclampsia with severe features.  Her delivery was complicated by failure to progress pass 4 cm after 18 hours. She subsequently had  delivery. She denies dysuria, vaginal bleeding, odor, itching or breast problems. Her method of feeding infant is bottlefeeding. She desires a Nexplanon  for her birth control method but did receive a Depo shot postpartum. Reports sex prior to this appointment. Eating a regular diet without difficulty. Bowel movement are Normal.  Patient denies crying spells, irritability, or mood swings. Never had pap testing.     Problem List     Patient Active Problem List    Diagnosis Date Noted   • Encounter for supervision of normal pregnancy in second trimester 10/04/2019     Priority: High   • Excessive weight gain during pregnancy in second trimester 2019     Priority: Medium       Objective    EDPS 0      Lab:No results found for this or any previous visit (from the past 1008 hour(s)).  Vitals:    20 0740   BP: 124/82   Weight: 62.6 kg (138 lb)     Vitals:    20 0740   BP: 124/82     Objective    Well nourished female in no acute distress  A& O x 3  Heart RRR  LCTAB  No edema noted and pulses WNL bilaterally in lower extremities; no claudication  BS x 4; no guarding or tenderness  Breasts: no erythema or discharge. No masses or tenderness.     Genitourinary: Pelvic exam was performed with patient supine. External genitalia with no abnormal pigmentation, labial fusion, rash, tenderness, lesion or injury to the labia bilaterally. Vagina is moist with no lesions, foul discharge, erythema, tenderness or bleeding. Mild discomfort with manipulation of introitus.     Assessment   Assessment:    1. Postpartum Exam  2. General Counseling and Advice on Contraception  3. Screening for Postpartum  Depression    Plan   Plan:    1. Breastfeeding support   2. Continue PNV   3. Contraceptive counseling- Desires Nexplanon and this is ordered  4. Discussed diet, exercise and resumption of sexual activity.  Discussed signs and symptoms of stress incontinence and reviewed pelvic floor exercises.    5. Preconception guidance for next pregnancy if applicable. Hypertension risk factors. Folic acid for all women of childbearing age. Patient to complete CBC in upcoming weeks. At this time, no antihypertensives indicated.   7.  Follow up in 4 or PRN

## 2020-03-20 VITALS — WEIGHT: 143 LBS | DIASTOLIC BLOOD PRESSURE: 82 MMHG | SYSTOLIC BLOOD PRESSURE: 124 MMHG | BODY MASS INDEX: 26.15 KG/M2

## 2020-03-20 PROBLEM — O26.02 EXCESSIVE WEIGHT GAIN DURING PREGNANCY IN SECOND TRIMESTER: Status: RESOLVED | Noted: 2019-11-14 | Resolved: 2020-03-20

## 2020-03-20 PROBLEM — Z98.891 HISTORY OF CESAREAN SECTION, LOW TRANSVERSE: Status: ACTIVE | Noted: 2020-03-20

## 2020-03-20 PROBLEM — Z87.59 HISTORY OF PREGNANCY INDUCED HYPERTENSION: Status: ACTIVE | Noted: 2020-03-20

## 2020-03-20 PROBLEM — Z34.92 ENCOUNTER FOR SUPERVISION OF NORMAL PREGNANCY IN SECOND TRIMESTER: Status: RESOLVED | Noted: 2019-10-04 | Resolved: 2020-03-20

## 2020-03-20 ASSESSMENT — FIBROSIS 4 INDEX: FIB4 SCORE: 0.65

## 2020-04-20 ENCOUNTER — GYNECOLOGY VISIT (OUTPATIENT)
Dept: OBGYN | Facility: CLINIC | Age: 20
End: 2020-04-20
Payer: MEDICAID

## 2020-04-20 VITALS — DIASTOLIC BLOOD PRESSURE: 78 MMHG | RESPIRATION RATE: 18 BRPM | HEART RATE: 84 BPM | SYSTOLIC BLOOD PRESSURE: 118 MMHG

## 2020-04-20 DIAGNOSIS — Z30.017 NEXPLANON INSERTION: ICD-10-CM

## 2020-04-20 LAB
INT CON NEG: NEGATIVE
INT CON POS: POSITIVE
POC URINE PREGNANCY TEST: NEGATIVE

## 2020-04-20 PROCEDURE — 81025 URINE PREGNANCY TEST: CPT | Performed by: ADVANCED PRACTICE MIDWIFE

## 2020-04-20 PROCEDURE — 11981 INSERTION DRUG DLVR IMPLANT: CPT | Performed by: ADVANCED PRACTICE MIDWIFE

## 2020-04-20 NOTE — PROGRESS NOTES
Mare Eason is a 19 y.o. female who presents for insertion of nexplanon        HPI Comments: Pt presents for nexplanon insertion.  No LMP recorded.  .  Review of Systems   Pertinent positives documented in HPI and all other systems reviewed & are negative      Past Medical History:   Diagnosis Date   • Head ache    • Physiological ovarian cysts 4/2015       Medications:   Current Outpatient Medications Ordered in Epic   Medication Sig Dispense Refill   • Prenatal Multivit-Min-Fe-FA (PRENATAL VITAMINS) 0.8 MG Tab Take 1 Tab by mouth every day. 30 Tab 3   • acetaminophen (TYLENOL) 325 MG Tab Take 2 Tabs by mouth every four hours as needed. 30 Tab 0     Current Facility-Administered Medications Ordered in Epic   Medication Dose Route Frequency Provider Last Rate Last Dose   • etonogestrel (NEXPLANON) implant 1 Each  1 Each Subcutaneous Once Dennis Marcos, C.N.M.             Objective  Well nourished female in no acute distress  A& O x 3  Respirations even and non labored.  Skin is moist, without erythema or rashes  Patient with normal mood and affect; good insight and judgement      Procedure note: Nexplanon insertion    All risks, benefits and potential side effects of the Nexplanon are discussed at length  Risks to include: Implant migrating, implant breaking in the arm, infection at the insertion/removal site, difficult removal of the device.  Side effects: irregular unpredictable bleeding, acne, mood changes, weight changes, headaches, dizziness  Benefits: most women (about 70%) have very little or no bleeding on the implant, decrease in cramping, strong reliable birth control at 99.6 % effective, private, totally reversible long term birth control method of 3 years   She verbalizes consent.        Urine hCG negative    Patient positioned supine with nondominant arm exposed.    Medial epicondyle of left arm palpated    Surgical shaka placed 8-10 cm medial to the medial epicondyle    Betadine  prep the skin    Local anesthesia-1% lidocaine injected using a 1 1/2 inch 27 gauge needle     Implant inserted via the Nexplanon insertion device subdermally in a sterile fashion    The patient and provider can feel the implant under the skin and the blue end of the insertion device is present indicating implant insertion    Steristrip and sterile 4x4's     Pressure bandage placed    Patient instructed to remove dressing  in 24 hours    Patient instructed to use a band-aid for 2 days there after    Patient tolerated the procedure well        Patient given Postoperative Advice  Take NSAIDS and tylenol for pain, ice packs prn bruising/discomfort  Remove gauze wrap after 24 hrs, or at anytime it becomes uncomfortable   Steri Strips to be removed at 3-4 days  RTC as needed

## 2020-04-20 NOTE — PROCEDURES
Nexplanon Insertion  Date/Time: 4/20/2020 11:38 AM  Performed by: Dennis Marcos C.N.M.  Authorized by: Dennis Marcos C.N.M.   Preparation: Patient was prepped and draped in the usual sterile fashion.  Local anesthesia used: yes    Anesthesia:  Local anesthesia used: yes  Local Anesthetic: lidocaine 1% without epinephrine  Anesthetic total: 3 mL    Sedation:  Patient sedated: no    Patient tolerance: Patient tolerated the procedure well with no immediate complications

## 2020-11-15 ENCOUNTER — HOSPITAL ENCOUNTER (EMERGENCY)
Facility: MEDICAL CENTER | Age: 20
End: 2020-11-16
Attending: EMERGENCY MEDICINE
Payer: MEDICAID

## 2020-11-15 DIAGNOSIS — B96.89 BACTERIAL VAGINOSIS: ICD-10-CM

## 2020-11-15 DIAGNOSIS — N76.0 BACTERIAL VAGINOSIS: ICD-10-CM

## 2020-11-15 DIAGNOSIS — N89.8 VAGINA ITCHING: ICD-10-CM

## 2020-11-15 LAB
APPEARANCE UR: CLEAR
BILIRUB UR QL STRIP.AUTO: NEGATIVE
COLOR UR: YELLOW
GLUCOSE UR STRIP.AUTO-MCNC: NEGATIVE MG/DL
HCG UR QL: NEGATIVE
KETONES UR STRIP.AUTO-MCNC: ABNORMAL MG/DL
LEUKOCYTE ESTERASE UR QL STRIP.AUTO: NEGATIVE
MICRO URNS: ABNORMAL
NITRITE UR QL STRIP.AUTO: NEGATIVE
PH UR STRIP.AUTO: 7.5 [PH] (ref 5–8)
PROT UR QL STRIP: NEGATIVE MG/DL
RBC UR QL AUTO: NEGATIVE
SP GR UR STRIP.AUTO: 1.02

## 2020-11-15 PROCEDURE — 81003 URINALYSIS AUTO W/O SCOPE: CPT

## 2020-11-15 PROCEDURE — 81025 URINE PREGNANCY TEST: CPT

## 2020-11-15 PROCEDURE — 87491 CHLMYD TRACH DNA AMP PROBE: CPT

## 2020-11-15 PROCEDURE — 87591 N.GONORRHOEAE DNA AMP PROB: CPT

## 2020-11-15 PROCEDURE — 99284 EMERGENCY DEPT VISIT MOD MDM: CPT

## 2020-11-15 ASSESSMENT — FIBROSIS 4 INDEX: FIB4 SCORE: 0.69

## 2020-11-16 VITALS
OXYGEN SATURATION: 100 % | DIASTOLIC BLOOD PRESSURE: 86 MMHG | BODY MASS INDEX: 25.48 KG/M2 | HEART RATE: 60 BPM | SYSTOLIC BLOOD PRESSURE: 129 MMHG | HEIGHT: 62 IN | WEIGHT: 138.45 LBS | RESPIRATION RATE: 16 BRPM | TEMPERATURE: 97.3 F

## 2020-11-16 LAB
BACTERIA GENITAL QL WET PREP: NORMAL
SIGNIFICANT IND 70042: NORMAL
SITE SITE: NORMAL
SOURCE SOURCE: NORMAL

## 2020-11-16 RX ORDER — METRONIDAZOLE 250 MG/1
250 TABLET ORAL 2 TIMES DAILY
Qty: 14 TAB | Refills: 0 | Status: SHIPPED | OUTPATIENT
Start: 2020-11-16 | End: 2020-11-23

## 2020-11-16 NOTE — ED PROVIDER NOTES
ED Provider Note    CHIEF COMPLAINT  Chief Complaint   Patient presents with   • Vaginal Itching     Reports vaginal itching and irritation x approx 1 week. Denies fevers, and pain, flank pain.         HPI    Primary care provider: Pcp Pt States None   History obtained from: Patient  History limited by: None     Mare Keren Eason is a 20 y.o. female who presents to the ED complaining of vaginal itching and discomfort with mild clear discharge for about a week.  She denies any pain.  She denies fever/chills/shortness of breath or difficulty breathing/nausea/vomiting/diarrhea/constipation/dysuria/rash.  She denies possibility of pregnancy and reports that she is on birth control.  She denies history of sexually transmitted fashions.  She denies recent travels or known ill contacts.    REVIEW OF SYSTEMS  Please see HPI for pertinent positives/negatives.  All other systems reviewed and are negative.     PAST MEDICAL HISTORY  Past Medical History:   Diagnosis Date   • Physiological ovarian cysts 2015   • Head ache         SURGICAL HISTORY  Past Surgical History:   Procedure Laterality Date   • PB  DELIVERY ONLY N/A 2020    Procedure:  SECTION, PRIMARY;  Surgeon: Gianna Temple M.D.;  Location: LABOR AND DELIVERY;  Service: Obstetrics        SOCIAL HISTORY  Social History     Tobacco Use   • Smoking status: Never Smoker   • Smokeless tobacco: Never Used   Substance and Sexual Activity   • Alcohol use: No   • Drug use: Yes     Types: Marijuana   • Sexual activity: Not Currently     Partners: Male     Comment: None         FAMILY HISTORY  Family History   Problem Relation Age of Onset   • No Known Problems Mother    • No Known Problems Sister    • No Known Problems Brother         CURRENT MEDICATIONS  Home Medications    **Home medications have not yet been reviewed for this encounter**          ALLERGIES  No Known Allergies     PHYSICAL EXAM  VITAL SIGNS: /78   Pulse 85    "Temp 36.3 °C (97.4 °F) (Temporal)   Resp 16   Ht 1.575 m (5' 2\")   Wt 62.8 kg (138 lb 7.2 oz)   LMP 02/18/2020 (Approximate)   SpO2 94%   BMI 25.32 kg/m²  @CARMENCITA[[id number]::@     Pulse ox interpretation: 94% I interpret this pulse ox as normal     Constitutional: Well developed, well nourished, alert in no apparent distress, nontoxic appearance    HENT: No external signs of trauma, normocephalic, mask on due to COVID-19 pandemic  Eyes: PERRL, conjunctiva without erythema, no discharge, no icterus    Neck: Soft and supple, trachea midline, no stridor, no tenderness, no LAD, no JVD, good ROM    Cardiovascular: Regular rate and rhythm, no murmurs/rubs/gallops, strong distal pulses and good perfusion    Thorax & Lungs: No respiratory distress, CTAB   Abdomen: Soft, nontender, nondistended, no guarding, no rebound, normal BS    : Female chaperon present for exam.  NEFG, vaginal canal without discharge/blood/FB, cervix closed, no CMT, no uterine TTP, no AT or palpable mass    Back: No CVAT    Extremities: No cyanosis, no edema, no gross deformity, good ROM, no tenderness, intact distal pulses with brisk cap refill    Skin: Warm, dry, no pallor/cyanosis, no rash noted    Lymphatic: No lymphadenopathy noted    Neuro: A/O times 3, no focal deficits noted, ambulating without difficulty  Psychiatric: Cooperative, normal mood and affect, normal judgement, appropriate for clinical situation        DIAGNOSTIC STUDIES / PROCEDURES        LABS  All labs reviewed by me.     Results for orders placed or performed during the hospital encounter of 11/15/20   WET PREP    Specimen: Vaginal; Genital   Result Value Ref Range    Significant Indicator NEG     Source GEN     Site VAGINAL     Wet Prep For Parasites       Few clue cells seen.  No yeast.  No motile Trichomonas seen.  Few WBCs seen.     Chlamydia/GC PCR Urine Or Swab    Specimen: Genital   Result Value Ref Range    Source Vaginal    URINALYSIS   Result Value Ref Range    Color " Yellow     Character Clear     Specific Gravity 1.020 <1.035    Ph 7.5 5.0 - 8.0    Glucose Negative Negative mg/dL    Ketones Trace (A) Negative mg/dL    Protein Negative Negative mg/dL    Bilirubin Negative Negative    Nitrite Negative Negative    Leukocyte Esterase Negative Negative    Occult Blood Negative Negative    Micro Urine Req see below    HCG QUALITATIVE UR   Result Value Ref Range    Beta-Hcg Urine Negative Negative        RADIOLOGY  The radiologist's interpretation of all radiological studies have been reviewed by me.     No orders to display          COURSE & MEDICAL DECISION MAKING  Nursing notes, VS, PMSFHx reviewed in chart.     Review of past medical records shows the patient was last admitted at [facility] 2020 and discharged on 2020 after  delivery.      Differential diagnoses considered include but are not limited to: Pregnancy, STI, PID, cervicitis, vaginitis, UTI       History and physical exam as above.  Patient with few clue cells on her wet prep and will be treated with Flagyl for bacterial vaginosis.  Gonorrhea/chlamydia is pending and patient will be notified if positive.  I discussed the findings with the patient.  This is a very pleasant patient in no acute distress and nontoxic in appearance with a benign exam.  Low clinical suspicion at this time for serious acute pathology such as sepsis, acute surgical abdomen, PID given the history/exam/findings.  Discussed with patient worrisome signs and symptoms and return to ED precautions and outpatient follow-up.  Patient verbalized understanding and agreed with plan of care with no further questions or concerns.      The patient is referred to a primary physician for blood pressure management, diabetic screening, and for all other preventative health concerns.       FINAL IMPRESSION  1. Vagina itching Acute   2. Bacterial vaginosis Active          DISPOSITION  Patient will be discharged  home in stable condition.       FOLLOW UP  Please follow up with your doctor    Call today      [facility], Emergency Dept  [address]  [place] [id number] [phone]    If symptoms worsen         OUTPATIENT MEDICATIONS  New Prescriptions    METRONIDAZOLE (FLAGYL) 250 MG TAB    Take 1 Tab by mouth 2 Times a Day for 7 days.          Electronically signed by: Leonidas Ortiz D.O., 11/15/2020 11:26 PM      Portions of this record were made with voice recognition software.  Despite my review, spelling/grammar/context errors may still remain.  Interpretation of this chart should be taken in this context.

## 2020-11-17 LAB
C TRACH DNA SPEC QL NAA+PROBE: POSITIVE
N GONORRHOEA DNA SPEC QL NAA+PROBE: NEGATIVE
SPECIMEN SOURCE: ABNORMAL

## 2020-11-18 RX ORDER — METRONIDAZOLE 500 MG/1
500 TABLET ORAL 2 TIMES DAILY
Qty: 8 TAB | Refills: 0 | Status: SHIPPED | OUTPATIENT
Start: 2020-11-18 | End: 2020-11-22

## 2020-11-18 RX ORDER — AZITHROMYCIN 500 MG/1
1000 TABLET, FILM COATED ORAL ONCE
Qty: 2 TAB | Refills: 0 | Status: SHIPPED | OUTPATIENT
Start: 2020-11-18 | End: 2020-11-18

## 2020-11-18 NOTE — ED NOTES
"ED Positive Culture Follow-up/Notification Note:   Date: 11/18/20    Patient seen in the ED on 11/15/2020 for vaginal itching and irration w/ discharged for about 1 week.     1. Vagina itching Acute   2. Bacterial vaginosis Active     Discharge Medication List as of 11/16/2020 12:29 AM      START taking these medications    Details   metroNIDAZOLE (FLAGYL) 250 MG Tab Take 1 Tab by mouth 2 Times a Day for 7 days., Disp-14 Tab, R-0, Print Rx Paper           Allergies: Patient has no known allergies.    Vitals:    11/15/20 2309 11/15/20 2310 11/16/20 0046   BP:  135/78 129/86   Pulse:  85 60   Resp:  16 16   Temp:  36.3 °C (97.4 °F) 36.3 °C (97.3 °F)   TempSrc:  Temporal Temporal   SpO2:  94% 100%   Weight: 62.8 kg (138 lb 7.2 oz)     Height: 1.575 m (5' 2\")         Final cultures:   Results     Procedure Component Value Units Date/Time    Chlamydia/GC PCR Urine Or Swab [[id number]]  (Abnormal) Collected: 11/15/20 2355    Order Status: Completed Specimen: Genital Updated: 11/17/20 2015     C. trachomatis by PCR POSITIVE     N. gonorrhoeae by PCR Negative     Source Vaginal    WET PREP [[id number]] Collected: 11/15/20 2355    Order Status: Completed Specimen: Genital from Vaginal Updated: 11/16/20 0002     Significant Indicator NEG     Source GEN     Site VAGINAL     Wet Prep For Parasites Few clue cells seen.  No yeast.  No motile Trichomonas seen.  Few WBCs seen.      URINALYSIS [[id number]]  (Abnormal) Collected: 11/15/20 2340    Order Status: Completed Updated: 11/15/20 2349     Color Yellow     Character Clear     Specific Gravity 1.020     Ph 7.5     Glucose Negative mg/dL      Ketones Trace mg/dL      Protein Negative mg/dL      Bilirubin Negative     Nitrite Negative     Leukocyte Esterase Negative     Occult Blood Negative     Micro Urine Req see below     Comment: Microscopic examination not performed when specimen is clear  and chemically negative for protein, blood, leukocyte esterase  and nitrite.         " URINALYSIS (UA) [[id number]] Collected: 11/15/20 2326    Order Status: Canceled Specimen: Urine, Clean Catch           Plan:   Wet prep showed clue cells and patient complained of vaginal discharge. Metronidazole dosing should be 500mg BID x 7 days instead of 250mg BID x 7 days for BV. Furthermore, patient tested positive for chlamydia and did not received treatment. Spoke with patient and stated to abstain from sexual intercourse 7 days after antibiotic therapy is completed, to notify any sexual partners within the last 60 days to go the [facility] for testing, to seek further STD/HIV testing, and to seek medical attention if symptoms persist.    Patient reported that she has already started taking her Flagyl. Stated to the patient to take 2 tablets by mouth twice a day until the bottle is completed. Stated I would call in a new prescription to finish the 7 day course of Flagyl 500mg 1BID for cost savings. Patient voiced understanding.    Counseled the patient about the Azithromycin and Flagyl. Counseled patient to avoid any alcohol or OTC items containing alcohol for 72 hours after the completion of Metronidazole.     Discussed culture results and change in antibiotics with patient, who will  new prescriptions at [facility] pharmacy.     New Rx:  Azithromycin 500 mg tabs: Take 2 tabs PO once,  #2, no refills - MD: Rad per protocol  Metronidazole 500mg tabs: Take 1 tablet PO BID, #8, NF - MD: Rad per protocol     Ashanti Lares, PharmD  T: [phone]

## 2020-12-10 ENCOUNTER — HOSPITAL ENCOUNTER (EMERGENCY)
Facility: MEDICAL CENTER | Age: 20
End: 2020-12-10
Attending: EMERGENCY MEDICINE
Payer: MEDICAID

## 2020-12-10 VITALS
WEIGHT: 138 LBS | TEMPERATURE: 97.7 F | HEART RATE: 49 BPM | BODY MASS INDEX: 25.4 KG/M2 | DIASTOLIC BLOOD PRESSURE: 73 MMHG | OXYGEN SATURATION: 96 % | SYSTOLIC BLOOD PRESSURE: 127 MMHG | RESPIRATION RATE: 14 BRPM | HEIGHT: 62 IN

## 2020-12-10 DIAGNOSIS — R11.2 NAUSEA AND VOMITING, INTRACTABILITY OF VOMITING NOT SPECIFIED, UNSPECIFIED VOMITING TYPE: ICD-10-CM

## 2020-12-10 DIAGNOSIS — R10.33 PERIUMBILICAL ABDOMINAL PAIN: ICD-10-CM

## 2020-12-10 LAB
ALBUMIN SERPL BCP-MCNC: 4.4 G/DL (ref 3.2–4.9)
ALBUMIN/GLOB SERPL: 1.3 G/DL
ALP SERPL-CCNC: 66 U/L (ref 30–99)
ALT SERPL-CCNC: 22 U/L (ref 2–50)
ANION GAP SERPL CALC-SCNC: 16 MMOL/L (ref 7–16)
AST SERPL-CCNC: 20 U/L (ref 12–45)
BASOPHILS # BLD AUTO: 0.1 % (ref 0–1.8)
BASOPHILS # BLD: 0.01 K/UL (ref 0–0.12)
BILIRUB SERPL-MCNC: 0.4 MG/DL (ref 0.1–1.5)
BUN SERPL-MCNC: 18 MG/DL (ref 8–22)
CALCIUM SERPL-MCNC: 10 MG/DL (ref 8.4–10.2)
CHLORIDE SERPL-SCNC: 101 MMOL/L (ref 96–112)
CO2 SERPL-SCNC: 18 MMOL/L (ref 20–33)
CREAT SERPL-MCNC: 0.88 MG/DL (ref 0.5–1.4)
EOSINOPHIL # BLD AUTO: 0 K/UL (ref 0–0.51)
EOSINOPHIL NFR BLD: 0 % (ref 0–6.9)
ERYTHROCYTE [DISTWIDTH] IN BLOOD BY AUTOMATED COUNT: 39.6 FL (ref 35.9–50)
GLOBULIN SER CALC-MCNC: 3.4 G/DL (ref 1.9–3.5)
GLUCOSE SERPL-MCNC: 131 MG/DL (ref 65–99)
HCG SERPL QL: NEGATIVE
HCT VFR BLD AUTO: 40.9 % (ref 37–47)
HGB BLD-MCNC: 13.3 G/DL (ref 12–16)
IMM GRANULOCYTES # BLD AUTO: 0.04 K/UL (ref 0–0.11)
IMM GRANULOCYTES NFR BLD AUTO: 0.4 % (ref 0–0.9)
LIPASE SERPL-CCNC: 11 U/L (ref 7–58)
LYMPHOCYTES # BLD AUTO: 0.71 K/UL (ref 1–4.8)
LYMPHOCYTES NFR BLD: 6.3 % (ref 22–41)
MCH RBC QN AUTO: 27.8 PG (ref 27–33)
MCHC RBC AUTO-ENTMCNC: 32.5 G/DL (ref 33.6–35)
MCV RBC AUTO: 85.4 FL (ref 81.4–97.8)
MONOCYTES # BLD AUTO: 0.17 K/UL (ref 0–0.85)
MONOCYTES NFR BLD AUTO: 1.5 % (ref 0–13.4)
NEUTROPHILS # BLD AUTO: 10.26 K/UL (ref 2–7.15)
NEUTROPHILS NFR BLD: 91.7 % (ref 44–72)
NRBC # BLD AUTO: 0 K/UL
NRBC BLD-RTO: 0 /100 WBC
PLATELET # BLD AUTO: 156 K/UL (ref 164–446)
PMV BLD AUTO: 12 FL (ref 9–12.9)
POTASSIUM SERPL-SCNC: 4 MMOL/L (ref 3.6–5.5)
PROT SERPL-MCNC: 7.8 G/DL (ref 6–8.2)
RBC # BLD AUTO: 4.79 M/UL (ref 4.2–5.4)
SODIUM SERPL-SCNC: 135 MMOL/L (ref 135–145)
WBC # BLD AUTO: 11.2 K/UL (ref 4.8–10.8)

## 2020-12-10 PROCEDURE — 99284 EMERGENCY DEPT VISIT MOD MDM: CPT

## 2020-12-10 PROCEDURE — 700111 HCHG RX REV CODE 636 W/ 250 OVERRIDE (IP): Performed by: EMERGENCY MEDICINE

## 2020-12-10 PROCEDURE — 85025 COMPLETE CBC W/AUTO DIFF WBC: CPT

## 2020-12-10 PROCEDURE — 80053 COMPREHEN METABOLIC PANEL: CPT

## 2020-12-10 PROCEDURE — 84703 CHORIONIC GONADOTROPIN ASSAY: CPT

## 2020-12-10 PROCEDURE — 83690 ASSAY OF LIPASE: CPT

## 2020-12-10 RX ORDER — ONDANSETRON 4 MG/1
4 TABLET, ORALLY DISINTEGRATING ORAL ONCE
Status: COMPLETED | OUTPATIENT
Start: 2020-12-10 | End: 2020-12-10

## 2020-12-10 RX ADMIN — ONDANSETRON 4 MG: 4 TABLET, ORALLY DISINTEGRATING ORAL at 22:02

## 2020-12-10 ASSESSMENT — FIBROSIS 4 INDEX: FIB4 SCORE: 0.69

## 2020-12-11 NOTE — ED PROVIDER NOTES
ED Provider Note    Chief Complaint:   Nausea, vomiting, abdominal pain    HPI:  Mare Eason is a 20 y.o. female who presents with chief complaint of nausea and vomiting, as well as some mild abdominal pain.  Her symptoms began around 1:00 in the afternoon, about 8 hours prior to arrival.  She states she had some stomach discomfort, and had multiple episodes of nausea and vomiting since that time.  On arrival to the emergency department her pain is steadily improving.  Additionally, her vomiting has improved as well.  She states she still feels slightly nauseated, but symptoms are definitely better.  She is resting comfortably in the emergency department.  She has no associated chest pain, no dysuria, no hematuria.  She reports no abnormal vaginal bleeding or vaginal discharge.  She is not in any pain or discomfort on my initial assessment, vomiting has improved as well.  She is unable to identify any exacerbating or alleviating factors, she is unable to identify any factors that brought on her symptoms.    Review of Systems:  See HPI for pertinent positives and negatives. All other systems negative.    Past Medical History:   has a past medical history of Head ache and Physiological ovarian cysts (2015).    Social History:  Social History     Tobacco Use   • Smoking status: Never Smoker   • Smokeless tobacco: Never Used   Substance and Sexual Activity   • Alcohol use: No   • Drug use: Yes     Types: Marijuana   • Sexual activity: Not Currently     Partners: Male     Comment: None        Surgical History:   has a past surgical history that includes  delivery only (N/A, 2020).    Current Medications:  Home Medications     Reviewed by Thanh Rust R.N. (Registered Nurse) on 12/10/20 at 2105  Med List Status: Partial   Medication Last Dose Status   acetaminophen (TYLENOL) 325 MG Tab  Active   Prenatal Multivit-Min-Fe-FA (PRENATAL VITAMINS) 0.8 MG Tab  Active           "      Allergies:  No Known Allergies    Physical Exam:  Vital Signs: /104   Pulse (!) 59   Temp 36.5 °C (97.7 °F) (Tympanic)   Resp 18   Ht 1.575 m (5' 2\")   Wt 62.6 kg (138 lb)   LMP 07/14/2020 (Approximate) Comment: Pt on birth control  SpO2 96%   BMI 25.24 kg/m²   Constitutional: Alert, resting, no acute distress  HENT: Normocephalic, mask in place  Eyes: Pupils equal and reactive, normal conjunctiva  Neck: Supple, normal range of motion, no stridor  Cardiovascular: Extremities are warm and well perfused  Pulmonary: No respiratory distress, normal work of breathing, no accessory muscule usage  Abdomen: Soft, non-distended, non-tender to palpation, no peritoneal signs, no right lower quadrant tenderness to palpation, no epigastric tenderness to palpation  Skin: Warm, dry, no rashes or lesions  Musculoskeletal: Normal range of motion in all extremities, no swelling or deformity noted  Neurologic: Alert, oriented, normal speech, normal motor function  Psychiatric: Normal and appropriate mood and affect    Medical records reviewed for continuity of care.  No recent visits for similar symptoms.    Labs:  Labs Reviewed   CBC WITH DIFFERENTIAL - Abnormal; Notable for the following components:       Result Value    WBC 11.2 (*)     MCHC 32.5 (*)     Platelet Count 156 (*)     Neutrophils-Polys 91.70 (*)     Lymphocytes 6.30 (*)     Neutrophils (Absolute) 10.26 (*)     Lymphs (Absolute) 0.71 (*)     All other components within normal limits   COMP METABOLIC PANEL - Abnormal; Notable for the following components:    Co2 18 (*)     Glucose 131 (*)     All other components within normal limits   HCG QUAL SERUM   ESTIMATED GFR   LIPASE     ED Medications Administered:  Medications   ondansetron (ZOFRAN ODT) dispertab 4 mg (4 mg Oral Given 12/10/20 2202)       Differential diagnosis:  Electrolyte abnormality, pancreatitis, gastroenteritis, nonspecific abdominal pain, cholecystitis, cholelithiasis    MDM:  Patient " presents with chief complaint of abdominal pain that occurred around 1:00 this afternoon, now significantly improved.  Her physical exam is benign.  She is afebrile, she has no tachycardia, no hypotension, no evidence of severe infection.  She has no right lower quadrant tenderness to suggest appendicitis, no right upper quadrant tenderness no epigastric tenderness to suggest cholelithiasis or cholecystitis.  She has no abdominal distention, no peritoneal signs.    On laboratory evaluation she has a mildly elevated white blood count.  Suspect this is due to her active vomiting immediately prior to arrival.  Lipase is within normal limits, again less concerning for pancreatitis.  Liver enzymes and bilirubin are within normal limits, no evidence of obstructive biliary pathology.  She has no pelvic pain, no dysuria, doubt urinary tract infection.  hCG is negative ruling out pregnancy and pregnancy related complications.    During her stay in the emergency department, she remained afebrile with no tachycardia, no hypotension.  She has no clinical evidence of dehydration.  Abdominal pain continues to improve and is nearly resolved at this point.  She is very comfortable, she did receive Zofran in the emergency department, and has not had any episodes of vomiting while here.    At this time, I do not believe she requires any further emergent diagnostics nor treatment.  Plan is for discharge home.  She will contact her primary care physician in the morning for complete recheck. Return precautions were discussed with the patient, and provided in written form with the patient's discharge instructions.     Personal protective equipment including N95 surgical respirator, goggles, and gloves were used during this encounter.       Disposition:  Discharge home in stable condition    Final Impression:  1. Nausea and vomiting, intractability of vomiting not specified, unspecified vomiting type    2. Periumbilical abdominal pain         Electronically signed by: Valencia Self MD, 12/10/2020 11:09 PM

## 2020-12-11 NOTE — ED NOTES
Patient has been cleared for discharge. Patient verbalized understanding of self care and follow up care. Patient's vital signs stable. Patient ambulatory to discharge without complications with belongings.

## 2020-12-11 NOTE — DISCHARGE INSTRUCTIONS
Please follow up with your primary care physician in 12-24 hours for abdominal recheck if your symptoms have not completely gone away. Call your primary care physician at the opening of business hours to let them know you were seen in the emergency department. Return immediately if your pain returns or worsens, if you develop any new symptoms, if you are not able to drink fluids, if you have persistent vomiting, if you develop fevers, or if you have any further concerns. Additionally, please return if your symptoms have not resolved and you are unable to follow up with your primary care physician for recheck.

## 2021-04-19 ENCOUNTER — HOSPITAL ENCOUNTER (EMERGENCY)
Facility: MEDICAL CENTER | Age: 21
End: 2021-04-19
Payer: MEDICAID

## 2021-04-19 VITALS
SYSTOLIC BLOOD PRESSURE: 115 MMHG | BODY MASS INDEX: 19.28 KG/M2 | WEIGHT: 134.7 LBS | HEIGHT: 70 IN | DIASTOLIC BLOOD PRESSURE: 75 MMHG | TEMPERATURE: 97.8 F | RESPIRATION RATE: 18 BRPM | HEART RATE: 88 BPM | OXYGEN SATURATION: 98 %

## 2021-04-19 PROCEDURE — 302449 STATCHG TRIAGE ONLY (STATISTIC)

## 2021-04-19 ASSESSMENT — FIBROSIS 4 INDEX: FIB4 SCORE: 0.55

## 2021-07-09 ENCOUNTER — NON-PROVIDER VISIT (OUTPATIENT)
Dept: URGENT CARE | Facility: PHYSICIAN GROUP | Age: 21
End: 2021-07-09

## 2021-07-09 DIAGNOSIS — Z02.1 PRE-EMPLOYMENT DRUG SCREENING: ICD-10-CM

## 2021-07-09 LAB
AMP AMPHETAMINE: NORMAL
COC COCAINE: NORMAL
INT CON NEG: NORMAL
INT CON POS: NORMAL
MET METHAMPHETAMINES: NORMAL
OPI OPIATES: NORMAL
PCP PHENCYCLIDINE: NORMAL
POC DRUG COMMENT 753798-OCCUPATIONAL HEALTH: NEGATIVE
THC: NORMAL

## 2021-07-09 PROCEDURE — 80305 DRUG TEST PRSMV DIR OPT OBS: CPT | Performed by: PHYSICIAN ASSISTANT

## 2021-07-13 ENCOUNTER — HOSPITAL ENCOUNTER (EMERGENCY)
Facility: MEDICAL CENTER | Age: 21
End: 2021-07-13
Payer: MEDICAID

## 2021-07-13 VITALS
HEART RATE: 67 BPM | OXYGEN SATURATION: 100 % | HEIGHT: 62 IN | WEIGHT: 124.78 LBS | DIASTOLIC BLOOD PRESSURE: 94 MMHG | SYSTOLIC BLOOD PRESSURE: 139 MMHG | BODY MASS INDEX: 22.96 KG/M2 | RESPIRATION RATE: 14 BRPM | TEMPERATURE: 98.1 F

## 2021-07-13 LAB — HCG UR QL: NEGATIVE

## 2021-07-13 PROCEDURE — 81025 URINE PREGNANCY TEST: CPT

## 2021-07-13 PROCEDURE — 302449 STATCHG TRIAGE ONLY (STATISTIC)

## 2021-07-13 ASSESSMENT — FIBROSIS 4 INDEX: FIB4 SCORE: 0.57

## 2021-07-14 NOTE — ED NOTES
Called again from waiting room. No answer. Tech checked all lobbies and bathrooms. Nowhere to be found. Will be dismissed.

## 2021-08-16 ENCOUNTER — HOSPITAL ENCOUNTER (EMERGENCY)
Facility: MEDICAL CENTER | Age: 21
End: 2021-08-16
Payer: MEDICAID

## 2021-08-16 VITALS
SYSTOLIC BLOOD PRESSURE: 135 MMHG | WEIGHT: 125.66 LBS | DIASTOLIC BLOOD PRESSURE: 100 MMHG | HEIGHT: 62 IN | TEMPERATURE: 98.9 F | BODY MASS INDEX: 23.12 KG/M2 | HEART RATE: 69 BPM | RESPIRATION RATE: 14 BRPM | OXYGEN SATURATION: 100 %

## 2021-08-16 PROCEDURE — 302449 STATCHG TRIAGE ONLY (STATISTIC)

## 2021-08-16 ASSESSMENT — FIBROSIS 4 INDEX: FIB4 SCORE: 0.57

## 2021-08-16 NOTE — ED TRIAGE NOTES
"Chief Complaint   Patient presents with   • Abdominal Pain     started last NOC, \"overstuffed myself last NOC\"   • N/V     /100   Pulse 69   Temp 37.2 °C (98.9 °F) (Temporal)   Resp 14   Ht 1.575 m (5' 2\")   Wt 57 kg (125 lb 10.6 oz)   LMP 07/16/2021   SpO2 100%   BMI 22.98 kg/m²     Pt ambulated to ED w/ visitor, states may have over eaten last NOC, states is unable to sleep or doing anything for more than 20 minutes without having to throw up.    Pt states did not received the Covid Vaccines.    "

## 2022-06-14 ENCOUNTER — HOSPITAL ENCOUNTER (EMERGENCY)
Facility: MEDICAL CENTER | Age: 22
End: 2022-06-14
Attending: EMERGENCY MEDICINE
Payer: COMMERCIAL

## 2022-06-14 VITALS
DIASTOLIC BLOOD PRESSURE: 82 MMHG | OXYGEN SATURATION: 99 % | HEIGHT: 62 IN | WEIGHT: 150 LBS | RESPIRATION RATE: 18 BRPM | BODY MASS INDEX: 27.6 KG/M2 | TEMPERATURE: 98.3 F | HEART RATE: 88 BPM | SYSTOLIC BLOOD PRESSURE: 139 MMHG

## 2022-06-14 DIAGNOSIS — N39.0 URINARY TRACT INFECTION WITHOUT HEMATURIA, SITE UNSPECIFIED: ICD-10-CM

## 2022-06-14 DIAGNOSIS — R10.30 LOWER ABDOMINAL PAIN: ICD-10-CM

## 2022-06-14 DIAGNOSIS — R11.2 NAUSEA AND VOMITING, INTRACTABILITY OF VOMITING NOT SPECIFIED, UNSPECIFIED VOMITING TYPE: ICD-10-CM

## 2022-06-14 LAB
ALBUMIN SERPL BCP-MCNC: 4.5 G/DL (ref 3.2–4.9)
ALBUMIN/GLOB SERPL: 1.6 G/DL
ALP SERPL-CCNC: 69 U/L (ref 30–99)
ALT SERPL-CCNC: 16 U/L (ref 2–50)
ANION GAP SERPL CALC-SCNC: 12 MMOL/L (ref 7–16)
APPEARANCE UR: CLEAR
AST SERPL-CCNC: 19 U/L (ref 12–45)
BACTERIA #/AREA URNS HPF: NEGATIVE /HPF
BASOPHILS # BLD AUTO: 0.2 % (ref 0–1.8)
BASOPHILS # BLD: 0.01 K/UL (ref 0–0.12)
BILIRUB SERPL-MCNC: 0.2 MG/DL (ref 0.1–1.5)
BILIRUB UR QL STRIP.AUTO: NEGATIVE
BUN SERPL-MCNC: 8 MG/DL (ref 8–22)
CALCIUM SERPL-MCNC: 9.3 MG/DL (ref 8.5–10.5)
CHLORIDE SERPL-SCNC: 106 MMOL/L (ref 96–112)
CO2 SERPL-SCNC: 22 MMOL/L (ref 20–33)
COLOR UR: YELLOW
CREAT SERPL-MCNC: 0.79 MG/DL (ref 0.5–1.4)
EOSINOPHIL # BLD AUTO: 0.01 K/UL (ref 0–0.51)
EOSINOPHIL NFR BLD: 0.2 % (ref 0–6.9)
EPI CELLS #/AREA URNS HPF: ABNORMAL /HPF
ERYTHROCYTE [DISTWIDTH] IN BLOOD BY AUTOMATED COUNT: 42.7 FL (ref 35.9–50)
GFR SERPLBLD CREATININE-BSD FMLA CKD-EPI: 108 ML/MIN/1.73 M 2
GLOBULIN SER CALC-MCNC: 2.8 G/DL (ref 1.9–3.5)
GLUCOSE SERPL-MCNC: 125 MG/DL (ref 65–99)
GLUCOSE UR STRIP.AUTO-MCNC: 100 MG/DL
HCG SERPL QL: NEGATIVE
HCT VFR BLD AUTO: 42.3 % (ref 37–47)
HGB BLD-MCNC: 13.7 G/DL (ref 12–16)
HYALINE CASTS #/AREA URNS LPF: ABNORMAL /LPF
IMM GRANULOCYTES # BLD AUTO: 0.02 K/UL (ref 0–0.11)
IMM GRANULOCYTES NFR BLD AUTO: 0.3 % (ref 0–0.9)
KETONES UR STRIP.AUTO-MCNC: 15 MG/DL
LEUKOCYTE ESTERASE UR QL STRIP.AUTO: ABNORMAL
LIPASE SERPL-CCNC: 18 U/L (ref 11–82)
LYMPHOCYTES # BLD AUTO: 0.82 K/UL (ref 1–4.8)
LYMPHOCYTES NFR BLD: 13.3 % (ref 22–41)
MCH RBC QN AUTO: 28.3 PG (ref 27–33)
MCHC RBC AUTO-ENTMCNC: 32.4 G/DL (ref 33.6–35)
MCV RBC AUTO: 87.4 FL (ref 81.4–97.8)
MICRO URNS: ABNORMAL
MONOCYTES # BLD AUTO: 0.2 K/UL (ref 0–0.85)
MONOCYTES NFR BLD AUTO: 3.3 % (ref 0–13.4)
NEUTROPHILS # BLD AUTO: 5.09 K/UL (ref 2–7.15)
NEUTROPHILS NFR BLD: 82.7 % (ref 44–72)
NITRITE UR QL STRIP.AUTO: NEGATIVE
NRBC # BLD AUTO: 0 K/UL
NRBC BLD-RTO: 0 /100 WBC
PH UR STRIP.AUTO: 7 [PH] (ref 5–8)
PLATELET # BLD AUTO: 173 K/UL (ref 164–446)
PMV BLD AUTO: 12.1 FL (ref 9–12.9)
POTASSIUM SERPL-SCNC: 3.7 MMOL/L (ref 3.6–5.5)
PROT SERPL-MCNC: 7.3 G/DL (ref 6–8.2)
PROT UR QL STRIP: 100 MG/DL
RBC # BLD AUTO: 4.84 M/UL (ref 4.2–5.4)
RBC # URNS HPF: >150 /HPF
RBC UR QL AUTO: ABNORMAL
SODIUM SERPL-SCNC: 140 MMOL/L (ref 135–145)
SP GR UR STRIP.AUTO: 1.02
UROBILINOGEN UR STRIP.AUTO-MCNC: 0.2 MG/DL
WBC # BLD AUTO: 6.2 K/UL (ref 4.8–10.8)
WBC #/AREA URNS HPF: ABNORMAL /HPF

## 2022-06-14 PROCEDURE — 36415 COLL VENOUS BLD VENIPUNCTURE: CPT

## 2022-06-14 PROCEDURE — 700111 HCHG RX REV CODE 636 W/ 250 OVERRIDE (IP): Performed by: EMERGENCY MEDICINE

## 2022-06-14 PROCEDURE — 700102 HCHG RX REV CODE 250 W/ 637 OVERRIDE(OP): Performed by: EMERGENCY MEDICINE

## 2022-06-14 PROCEDURE — A9270 NON-COVERED ITEM OR SERVICE: HCPCS | Performed by: EMERGENCY MEDICINE

## 2022-06-14 PROCEDURE — 84703 CHORIONIC GONADOTROPIN ASSAY: CPT

## 2022-06-14 PROCEDURE — 85025 COMPLETE CBC W/AUTO DIFF WBC: CPT

## 2022-06-14 PROCEDURE — 80053 COMPREHEN METABOLIC PANEL: CPT

## 2022-06-14 PROCEDURE — 99285 EMERGENCY DEPT VISIT HI MDM: CPT

## 2022-06-14 PROCEDURE — 81001 URINALYSIS AUTO W/SCOPE: CPT

## 2022-06-14 PROCEDURE — 87086 URINE CULTURE/COLONY COUNT: CPT

## 2022-06-14 PROCEDURE — 83690 ASSAY OF LIPASE: CPT

## 2022-06-14 PROCEDURE — 96374 THER/PROPH/DIAG INJ IV PUSH: CPT

## 2022-06-14 PROCEDURE — 96375 TX/PRO/DX INJ NEW DRUG ADDON: CPT

## 2022-06-14 RX ORDER — ONDANSETRON 2 MG/ML
4 INJECTION INTRAMUSCULAR; INTRAVENOUS ONCE
Status: COMPLETED | OUTPATIENT
Start: 2022-06-14 | End: 2022-06-14

## 2022-06-14 RX ORDER — ONDANSETRON 4 MG/1
4 TABLET, ORALLY DISINTEGRATING ORAL EVERY 6 HOURS PRN
Qty: 20 TABLET | Refills: 0 | Status: SHIPPED | OUTPATIENT
Start: 2022-06-14 | End: 2023-04-07 | Stop reason: SDUPTHER

## 2022-06-14 RX ORDER — NAPROXEN 500 MG/1
500 TABLET ORAL
Qty: 60 TABLET | Refills: 0 | Status: SHIPPED | OUTPATIENT
Start: 2022-06-14 | End: 2023-04-07 | Stop reason: CLARIF

## 2022-06-14 RX ORDER — KETOROLAC TROMETHAMINE 30 MG/ML
30 INJECTION, SOLUTION INTRAMUSCULAR; INTRAVENOUS ONCE
Status: COMPLETED | OUTPATIENT
Start: 2022-06-14 | End: 2022-06-14

## 2022-06-14 RX ORDER — NITROFURANTOIN 25; 75 MG/1; MG/1
100 CAPSULE ORAL 2 TIMES DAILY
Qty: 10 CAPSULE | Refills: 0 | Status: SHIPPED | OUTPATIENT
Start: 2022-06-14 | End: 2022-06-19

## 2022-06-14 RX ORDER — NITROFURANTOIN 25; 75 MG/1; MG/1
100 CAPSULE ORAL ONCE
Status: COMPLETED | OUTPATIENT
Start: 2022-06-14 | End: 2022-06-14

## 2022-06-14 RX ADMIN — NITROFURANTOIN MONOHYDRATE/MACROCRYSTALLINE 100 MG: 25; 75 CAPSULE ORAL at 18:13

## 2022-06-14 RX ADMIN — KETOROLAC TROMETHAMINE 30 MG: 30 INJECTION, SOLUTION INTRAMUSCULAR at 16:49

## 2022-06-14 RX ADMIN — ONDANSETRON HYDROCHLORIDE 4 MG: 2 SOLUTION INTRAMUSCULAR; INTRAVENOUS at 16:14

## 2022-06-14 ASSESSMENT — ENCOUNTER SYMPTOMS
SHORTNESS OF BREATH: 0
FEVER: 0
ABDOMINAL PAIN: 1
NAUSEA: 1
VOMITING: 1

## 2022-06-14 ASSESSMENT — FIBROSIS 4 INDEX: FIB4 SCORE: 0.6

## 2022-06-14 NOTE — ED TRIAGE NOTES
Pt bib ems from home.  Chief Complaint   Patient presents with   • Abdominal Cramping     Low, x1d   • N/V     Started period yesterday. N/V today. Reports normal flow for her.  PTA Zofran 4mg, Fent 100mcg IV  Pt states she feels a little better after med admin.

## 2022-06-14 NOTE — ED PROVIDER NOTES
ED Provider Note    Scribed for Katie Cohen M.D. by Daysi Hwang. 2022, 3:57 PM.    Primary care provider: Pcp Pt States None  Means of arrival: EMS  History obtained from: EMS and Patient  History limited by: None pertinent.    CHIEF COMPLAINT  Chief Complaint   Patient presents with   • Abdominal Cramping     Low, x1d   • N/V       HPI  Mare Eason is a 22 y.o. female who presents to the Emergency Department with lower abdominal cramping onset this morning. Patient states her menstrual period began yesterday. She notes that she does not typically have abdominal cramping of this severity with menstrual periods.  However she does typically get cramping with her menstrual cycles.  She has been in the emergency department for this before and plans to follow-up with her gynecologist later this month.  She describes it as a moderate cramping pain localized to her suprapubic region.  She endorses associated nausea and vomiting, but denies any fever. The patient has a history of appendectomy and .     REVIEW OF SYSTEMS  Review of Systems   Constitutional: Negative for fever.   Respiratory: Negative for shortness of breath.    Cardiovascular: Negative for chest pain.   Gastrointestinal: Positive for abdominal pain (lower), nausea and vomiting.   Genitourinary: Negative for dysuria.   All other systems reviewed and are negative.    PAST MEDICAL HISTORY   has a past medical history of Head ache and Physiological ovarian cysts (2015).    SURGICAL HISTORY   has a past surgical history that includes  delivery only (N/A, 2020).    SOCIAL HISTORY  Social History     Tobacco Use   • Smoking status: Never Smoker   • Smokeless tobacco: Never Used   Vaping Use   • Vaping Use: Never used   Substance Use Topics   • Alcohol use: Yes   • Drug use: Yes     Types: Marijuana     Comment: Marijuana       Social History     Substance and Sexual Activity   Drug Use Yes   • Types: Marijuana     "Comment: Marijuana        FAMILY HISTORY  Family History   Problem Relation Age of Onset   • No Known Problems Mother    • No Known Problems Sister    • No Known Problems Brother        CURRENT MEDICATIONS  Home Medications     Reviewed by Rosalee Kruger R.N. (Registered Nurse) on 06/14/22 at 1315  Med List Status: Partial   Medication Last Dose Status   acetaminophen (TYLENOL) 325 MG Tab  Active   Prenatal Multivit-Min-Fe-FA (PRENATAL VITAMINS) 0.8 MG Tab  Active                ALLERGIES  No Known Allergies    PHYSICAL EXAM  VITAL SIGNS: BP (!) 153/106   Pulse (!) 52   Temp 36.7 °C (98 °F) (Temporal)   Resp 16   Ht 1.575 m (5' 2\")   Wt 68 kg (150 lb)   LMP 06/13/2022 (Exact Date)   SpO2 100%   BMI 27.44 kg/m²   Vitals reviewed by myself.    Nursing note and vitals reviewed.  Constitutional: Well-developed and well-nourished. No acute distress.   HENT: Head is normocephalic and atraumatic.  Eyes: extra-ocular movements intact  Cardiovascular: Bradycardic rate and regular rhythm. No murmur heard.  Pulmonary/Chest: Breath sounds normal. No wheezes or rales.   Abdominal: Soft and mild suprapubic tenderness, No distention.    Musculoskeletal: Extremities exhibit normal range of motion without edema or tenderness.   Neurological: Awake and alert  Skin: Skin is warm and dry. No rash.     DIAGNOSTIC STUDIES /  LABS  Labs Reviewed   CBC WITH DIFFERENTIAL - Abnormal; Notable for the following components:       Result Value    MCHC 32.4 (*)     Neutrophils-Polys 82.70 (*)     Lymphocytes 13.30 (*)     Lymphs (Absolute) 0.82 (*)     All other components within normal limits   COMP METABOLIC PANEL - Abnormal; Notable for the following components:    Glucose 125 (*)     All other components within normal limits   URINALYSIS,CULTURE IF INDICATED - Abnormal; Notable for the following components:    Glucose 100 (*)     Ketones 15 (*)     Protein 100 (*)     Leukocyte Esterase Trace (*)     Occult Blood Large (*)     All " other components within normal limits    Narrative:     Indication for culture:->Patient WITHOUT an indwelling Ribera  catheter in place with new onset of Dysuria, Frequency,  Urgency, and/or Suprapubic pain   URINE MICROSCOPIC (W/UA) - Abnormal; Notable for the following components:    WBC 10-20 (*)     RBC >150 (*)     Epithelial Cells Moderate (*)     Hyaline Cast 3-5 (*)     All other components within normal limits    Narrative:     Indication for culture:->Patient WITHOUT an indwelling Ribera  catheter in place with new onset of Dysuria, Frequency,  Urgency, and/or Suprapubic pain   LIPASE   HCG QUAL SERUM   ESTIMATED GFR   URINE CULTURE(NEW)    Narrative:     Indication for culture:->Patient WITHOUT an indwelling Ribera  catheter in place with new onset of Dysuria, Frequency,  Urgency, and/or Suprapubic pain       All labs reviewed by me.    REASSESSMENT  4:24 PM - Patient seen at bedside. I discussed with her the plan for pain medication and labs. Patient verbalizes understanding and agreement to this plan of care.       COURSE & MEDICAL DECISION MAKING  Nursing notes, VS, PMSFHx reviewed in chart.    Patient is a 22-year-old female who comes in for evaluation of suprapubic abdominal pain.  Differential diagnosis includes premenstrual syndrome, uterine cramping, urinary tract infection.  She is not having any unilateral pain and therefore I feel ovarian cyst and torsion are less likely.  Her symptoms are consistent with prior menstrual symptoms in the past.  Diagnostic work-up includes labs and urinalysis.    Patient's initial vitals are notable for slight hypertension likely secondary to pain.  Pregnancy test is negative and she is treated with Toradol and Zofran after which she feels improved.  Labs returned and are unremarkable.  No leukocytosis.  Urinalysis returns and appears contaminated with blood from her menstrual cycle however there is some leukocyte esterase and white blood cells and as patient is  having some suprapubic discomfort we will treat this as a urinary tract infection.  Patient is started on Macrobid for this.  As patient is feeling improved I will discharge her home with Zofran, naproxen and Macrobid.  She is advised to follow-up with her gynecologist and given strict return precautions.  Patient is then discharged in stable condition.        FINAL IMPRESSION  1. Lower abdominal pain    2. Nausea and vomiting, intractability of vomiting not specified, unspecified vomiting type    3. Urinary tract infection without hematuria, site unspecified          Daysi BROWNE (Scribe), dorina scribing for, and in the presence of, Katie Cohen M.D..    Electronically signed by: Daysi Hwang (Scribe), 6/14/2022    Katie BROWNE M.D. personally performed the services described in this documentation, as scribed by Daysi Hwang in my presence, and it is both accurate and complete.    The note accurately reflects work and decisions made by me.  Katie Cohen M.D.  6/14/2022  6:16 PM

## 2022-06-14 NOTE — ED TRIAGE NOTES
PT [id number]. Apologized for wait times and thanked them for their patience. Advised them to please let us know if anything changes in their condition.

## 2022-06-15 NOTE — ED NOTES
D/C home with written and verbal instructions re: Rx, activity, f/u.  Verbalizes understanding.  Ambulated out with steady gate.

## 2022-06-17 LAB
BACTERIA UR CULT: NORMAL
SIGNIFICANT IND 70042: NORMAL
SITE SITE: NORMAL
SOURCE SOURCE: NORMAL

## 2023-01-04 ENCOUNTER — APPOINTMENT (OUTPATIENT)
Dept: RADIOLOGY | Facility: MEDICAL CENTER | Age: 23
End: 2023-01-04
Attending: EMERGENCY MEDICINE
Payer: COMMERCIAL

## 2023-01-04 ENCOUNTER — HOSPITAL ENCOUNTER (EMERGENCY)
Facility: MEDICAL CENTER | Age: 23
End: 2023-01-04
Attending: EMERGENCY MEDICINE
Payer: COMMERCIAL

## 2023-01-04 VITALS
RESPIRATION RATE: 16 BRPM | TEMPERATURE: 98.5 F | BODY MASS INDEX: 26.16 KG/M2 | WEIGHT: 143 LBS | SYSTOLIC BLOOD PRESSURE: 137 MMHG | DIASTOLIC BLOOD PRESSURE: 92 MMHG | OXYGEN SATURATION: 100 % | HEART RATE: 81 BPM

## 2023-01-04 DIAGNOSIS — O20.0 THREATENED MISCARRIAGE IN EARLY PREGNANCY: ICD-10-CM

## 2023-01-04 LAB
ALBUMIN SERPL BCP-MCNC: 4.4 G/DL (ref 3.2–4.9)
ALBUMIN/GLOB SERPL: 1.6 G/DL
ALP SERPL-CCNC: 60 U/L (ref 30–99)
ALT SERPL-CCNC: 9 U/L (ref 2–50)
ANION GAP SERPL CALC-SCNC: 12 MMOL/L (ref 7–16)
APPEARANCE UR: CLEAR
AST SERPL-CCNC: 16 U/L (ref 12–45)
B-HCG SERPL-ACNC: 1002 MIU/ML (ref 0–5)
BACTERIA #/AREA URNS HPF: NEGATIVE /HPF
BASOPHILS # BLD AUTO: 0.2 % (ref 0–1.8)
BASOPHILS # BLD: 0.01 K/UL (ref 0–0.12)
BILIRUB SERPL-MCNC: 0.2 MG/DL (ref 0.1–1.5)
BILIRUB UR QL STRIP.AUTO: NEGATIVE
BUN SERPL-MCNC: 9 MG/DL (ref 8–22)
CALCIUM ALBUM COR SERPL-MCNC: 9.1 MG/DL (ref 8.5–10.5)
CALCIUM SERPL-MCNC: 9.4 MG/DL (ref 8.5–10.5)
CHLORIDE SERPL-SCNC: 107 MMOL/L (ref 96–112)
CO2 SERPL-SCNC: 19 MMOL/L (ref 20–33)
COLOR UR: YELLOW
CREAT SERPL-MCNC: 0.86 MG/DL (ref 0.5–1.4)
EOSINOPHIL # BLD AUTO: 0.06 K/UL (ref 0–0.51)
EOSINOPHIL NFR BLD: 0.9 % (ref 0–6.9)
EPI CELLS #/AREA URNS HPF: ABNORMAL /HPF
ERYTHROCYTE [DISTWIDTH] IN BLOOD BY AUTOMATED COUNT: 42.3 FL (ref 35.9–50)
GFR SERPLBLD CREATININE-BSD FMLA CKD-EPI: 97 ML/MIN/1.73 M 2
GLOBULIN SER CALC-MCNC: 2.7 G/DL (ref 1.9–3.5)
GLUCOSE SERPL-MCNC: 132 MG/DL (ref 65–99)
GLUCOSE UR STRIP.AUTO-MCNC: NEGATIVE MG/DL
HCT VFR BLD AUTO: 37.9 % (ref 37–47)
HGB BLD-MCNC: 12.1 G/DL (ref 12–16)
HYALINE CASTS #/AREA URNS LPF: ABNORMAL /LPF
IMM GRANULOCYTES # BLD AUTO: 0.03 K/UL (ref 0–0.11)
IMM GRANULOCYTES NFR BLD AUTO: 0.5 % (ref 0–0.9)
KETONES UR STRIP.AUTO-MCNC: 15 MG/DL
LEUKOCYTE ESTERASE UR QL STRIP.AUTO: NEGATIVE
LIPASE SERPL-CCNC: 23 U/L (ref 11–82)
LYMPHOCYTES # BLD AUTO: 1.83 K/UL (ref 1–4.8)
LYMPHOCYTES NFR BLD: 28.2 % (ref 22–41)
MCH RBC QN AUTO: 27.2 PG (ref 27–33)
MCHC RBC AUTO-ENTMCNC: 31.9 G/DL (ref 33.6–35)
MCV RBC AUTO: 85.2 FL (ref 81.4–97.8)
MICRO URNS: ABNORMAL
MONOCYTES # BLD AUTO: 0.31 K/UL (ref 0–0.85)
MONOCYTES NFR BLD AUTO: 4.8 % (ref 0–13.4)
NEUTROPHILS # BLD AUTO: 4.26 K/UL (ref 2–7.15)
NEUTROPHILS NFR BLD: 65.4 % (ref 44–72)
NITRITE UR QL STRIP.AUTO: NEGATIVE
NRBC # BLD AUTO: 0 K/UL
NRBC BLD-RTO: 0 /100 WBC
PH UR STRIP.AUTO: 6.5 [PH] (ref 5–8)
PLATELET # BLD AUTO: 148 K/UL (ref 164–446)
PMV BLD AUTO: 11.4 FL (ref 9–12.9)
POTASSIUM SERPL-SCNC: 4 MMOL/L (ref 3.6–5.5)
PROT SERPL-MCNC: 7.1 G/DL (ref 6–8.2)
PROT UR QL STRIP: NEGATIVE MG/DL
RBC # BLD AUTO: 4.45 M/UL (ref 4.2–5.4)
RBC # URNS HPF: ABNORMAL /HPF
RBC UR QL AUTO: ABNORMAL
SODIUM SERPL-SCNC: 138 MMOL/L (ref 135–145)
SP GR UR STRIP.AUTO: 1.02
UROBILINOGEN UR STRIP.AUTO-MCNC: 0.2 MG/DL
WBC # BLD AUTO: 6.5 K/UL (ref 4.8–10.8)
WBC #/AREA URNS HPF: ABNORMAL /HPF

## 2023-01-04 PROCEDURE — 36415 COLL VENOUS BLD VENIPUNCTURE: CPT

## 2023-01-04 PROCEDURE — 96375 TX/PRO/DX INJ NEW DRUG ADDON: CPT

## 2023-01-04 PROCEDURE — 76801 OB US < 14 WKS SINGLE FETUS: CPT

## 2023-01-04 PROCEDURE — 85025 COMPLETE CBC W/AUTO DIFF WBC: CPT

## 2023-01-04 PROCEDURE — 99284 EMERGENCY DEPT VISIT MOD MDM: CPT

## 2023-01-04 PROCEDURE — 81001 URINALYSIS AUTO W/SCOPE: CPT

## 2023-01-04 PROCEDURE — 96374 THER/PROPH/DIAG INJ IV PUSH: CPT

## 2023-01-04 PROCEDURE — 83690 ASSAY OF LIPASE: CPT

## 2023-01-04 PROCEDURE — 80053 COMPREHEN METABOLIC PANEL: CPT

## 2023-01-04 PROCEDURE — 700111 HCHG RX REV CODE 636 W/ 250 OVERRIDE (IP): Performed by: EMERGENCY MEDICINE

## 2023-01-04 PROCEDURE — 84702 CHORIONIC GONADOTROPIN TEST: CPT

## 2023-01-04 RX ORDER — METOCLOPRAMIDE HYDROCHLORIDE 5 MG/ML
10 INJECTION INTRAMUSCULAR; INTRAVENOUS ONCE
Status: COMPLETED | OUTPATIENT
Start: 2023-01-04 | End: 2023-01-04

## 2023-01-04 RX ORDER — ONDANSETRON 2 MG/ML
4 INJECTION INTRAMUSCULAR; INTRAVENOUS ONCE
Status: COMPLETED | OUTPATIENT
Start: 2023-01-04 | End: 2023-01-04

## 2023-01-04 RX ADMIN — METOCLOPRAMIDE 10 MG: 5 INJECTION, SOLUTION INTRAMUSCULAR; INTRAVENOUS at 12:16

## 2023-01-04 RX ADMIN — ONDANSETRON 4 MG: 2 INJECTION INTRAMUSCULAR; INTRAVENOUS at 11:22

## 2023-01-04 ASSESSMENT — ENCOUNTER SYMPTOMS
ABDOMINAL PAIN: 1
VOMITING: 1
DIARRHEA: 1
FEVER: 0
NAUSEA: 1

## 2023-01-04 ASSESSMENT — FIBROSIS 4 INDEX: FIB4 SCORE: 0.6

## 2023-01-04 NOTE — ED NOTES
Patient to room from lobby with steady gait. Changed into gown, connected to monitor. Agree with triage note. Charted for ERP. Call light within reach.    Dr. Cohen to bedside for pelvic exam.

## 2023-01-04 NOTE — DISCHARGE INSTRUCTIONS
As we discussed we cannot rule out ectopic pregnancy versus early miscarriage versus early pregnancy.  You need repeat labs and ultrasound in 2 days.  Please follow-up at the pregnancy center.  I have ordered the lab test, please go to the lab to have this done

## 2023-01-04 NOTE — ED TRIAGE NOTES
Patient comes in with with RLQ pain, n/v, green clear emesis noted, vaginal bleeding started on 12/28 and continues to get worse, patient last cycle was 11/26.  No bleeding thru any pads, described as spotting, noted elevated BP.

## 2023-01-04 NOTE — ED NOTES
Patient discharged per order. Oral and written discharge instructions reviewed. IV removed. All belongings accounted for and taken with patient. Questions answered, and patient agrees with discharge plan. Patient escorted to lobby. Encouraged to follow up with PCP/OBGYN

## 2023-01-04 NOTE — ED NOTES
Medicated patient per MAR. Urine sample sent to lab. Patient denies further needs. Call light within reach.

## 2023-01-04 NOTE — ED PROVIDER NOTES
ER Provider Note    Scribed for Katie Cohen M.d. by Alonzo Armenta. 2023  11:01 AM    Primary Care Provider: Pcp Pt States None    CHIEF COMPLAINT  Chief Complaint   Patient presents with    Abdominal Pain    Pregnancy    Vaginal Bleeding       HPI/ROS  Mare Keren Eason is a pregnant (LMP 22, G2, P1, A0) 22 y.o. female who presents to the ED complaining of sharp suprapubic pain radiating to her right lower quadrant abdominal pain onset this morning. She states she took a home pregnancy test 22, which resulted positive. She reports she began to experience dark vaginal bleeding 22 and is concerned for possible miscarriage.  It was only spotting but has persisted and therefore she came in for further evaluation of this.  She admits to associated symptoms of nausea, vomiting, and diarrhea, but denies any fevers. No alleviating factors were reported. She reports she was pregnant 2 years ago without any complications.     Record review demonstrates that patient's blood type is O+    LIMITATION TO HISTORY   Select: : None    OUTSIDE HISTORIAN(S):  Select: None    Review of Systems   Constitutional:  Negative for fever.   Gastrointestinal:  Positive for abdominal pain (sharp suprabuic pain radiating to RLQ), diarrhea, nausea and vomiting.   Genitourinary:         Positive for pregnancy and dark vaginal bleeding   All other systems reviewed and are negative.      PAST MEDICAL HISTORY  Past Medical History:   Diagnosis Date    Head ache     Physiological ovarian cysts 2015       SURGICAL HISTORY  Past Surgical History:   Procedure Laterality Date    CT  DELIVERY ONLY N/A 2020    Procedure:  SECTION, PRIMARY;  Surgeon: Gianna Temple M.D.;  Location: LABOR AND DELIVERY;  Service: Obstetrics       FAMILY HISTORY  Family History   Problem Relation Age of Onset    No Known Problems Mother     No Known Problems Sister     No Known Problems Brother         SOCIAL HISTORY   reports that she has never smoked. She has never used smokeless tobacco. She reports current alcohol use. She reports current drug use. Drug: Marijuana.    CURRENT MEDICATIONS  Previous Medications    ACETAMINOPHEN (TYLENOL) 325 MG TAB    Take 2 Tabs by mouth every four hours as needed.    NAPROXEN (NAPROSYN) 500 MG TAB    Take 1 Tablet by mouth 2 times daily with meals as needed (abdominal pain).    ONDANSETRON (ZOFRAN ODT) 4 MG TABLET DISPERSIBLE    Take 1 Tablet by mouth every 6 hours as needed for Nausea.    PRENATAL MULTIVIT-MIN-FE-FA (PRENATAL VITAMINS) 0.8 MG TAB    Take 1 Tab by mouth every day.       ALLERGIES  Patient has no known allergies.    PHYSICAL EXAM  BP (!) 179/104   Pulse 63   Temp 36.9 °C (98.5 °F) (Temporal)   Resp 16   Wt 64.9 kg (143 lb)   LMP 06/13/2022 (Exact Date)   SpO2 100%   BMI 26.16 kg/m²     Nursing note and vitals reviewed.  Constitutional: Well-developed and well-nourished. No acute distress.   HENT: Head is normocephalic and atraumatic.  Eyes: extra-ocular movements intact  Cardiovascular: Regular rate and regular rhythm. No murmur heard.  Pulmonary/Chest: Breath sounds normal. No wheezes or rales.   Abdominal: Soft. No tenderness.  Negative Cohen sign, negative McBurney's point tenderness no distention.   Pelvic: Cervical os is closed, there is blood noted in the vaginal vault, no active bleeding from the cervical os  Musculoskeletal: Extremities exhibit normal range of motion without edema or tenderness.   Neurological: Awake and alert  Skin: Skin is warm and dry. No rash.         DIAGNOSTIC STUDIES    Labs:   Labs Reviewed   CBC WITH DIFFERENTIAL - Abnormal; Notable for the following components:       Result Value    MCHC 31.9 (*)     Platelet Count 148 (*)     All other components within normal limits   COMP METABOLIC PANEL - Abnormal; Notable for the following components:    Co2 19 (*)     Glucose 132 (*)     All other components within normal  limits   HCG QUANTITATIVE - Abnormal; Notable for the following components:    Bhcg 1002.0 (*)     All other components within normal limits   URINALYSIS,CULTURE IF INDICATED - Abnormal; Notable for the following components:    Ketones 15 (*)     Occult Blood Moderate (*)     All other components within normal limits    Narrative:     Indication for culture:->Pregnant women: fever and/or  asymptomatic screening   URINE MICROSCOPIC (W/UA) - Abnormal; Notable for the following components:    RBC 2-5 (*)     All other components within normal limits    Narrative:     Indication for culture:->Pregnant women: fever and/or  asymptomatic screening   LIPASE   CORRECTED CALCIUM   ESTIMATED GFR       Radiology:   US-OB 1ST TRIMESTER WITH TRANSVAGINAL (COMBO)   Final Result      1.  No intrauterine or extrauterine pregnancy demonstrated.   2.  Ectopic pregnancy is not excluded. Recommend followup ultrasound and serial beta-hCG levels.   3.  Probable involuting LEFT ovarian cyst or follicle.   4.  Small amount of free fluid in the cul-de-sac.               COURSE & MEDICAL DECISION MAKING     ED Observation Status? No; Patient does not meet criteria for ED Observation.     INITIAL ASSESSMENT AND PLAN    10:55 AM Evaluated patient at bedside. Plan of care was discussed including labs and imaging. Patient verbalized understanding and agrees to the treatment care plan.  The patient will be treated with Reglan and Zofran for her symptoms.    12:42 PM - I reevaluated the patient at bedside. I discussed the patient's diagnostic study results. Ordered outpatient HCG Quant in 2 days.  I discussed plan for discharge and follow up as outlined below.  I discussed ectopic pregnancy and bleeding return precautions. The patient is stable for discharge at this time and will return for any new or worsening symptoms. Patient verbalizes understanding and support with my plan for discharge.     12:55 PM I discussed the patient's case and the  above findings with GYN who will make sure she gets appropriate follow-up in 2 days.      ADDITIONAL PROBLEM LIST AND DISPOSITION    I have discussed management of the patient with the following physicians and ELEN's:  Gynecology Dr. Morrissey    Discussion of management with other [facility] or appropriate source(s): Select: None     Escalation of care considered, and ultimately not performed: see below    Barriers to care at this time, including but not limited to: none.     Decision tools and prescription drugs considered including, but not limited to: see below.    DISCUSSION  Patient is a 22-year-old female who presents for evaluation of vaginal bleeding in pregnancy.  Differential diagnosis includes early pregnancy, subchorionic hemorrhage, threatened , ectopic pregnancy.  Diagnostic work-up includes labs and pelvic ultrasound.    Patient's initial vitals are notable for hypertension, she is having nausea.  She is treated with Zofran after which she feels improved and hypertension also improved.  Pelvic exam notable for blood in the vaginal vault, cervical os is currently closed with no active bleeding.  Patient is Rh+ and therefore RhoGAM is not indicated.  Beta quant returns at 1002.  Ultrasound returns and demonstrates no evidence of pregnancy.  At this time cannot rule out early pregnancy versus threatened  versus ectopic pregnancy.  Patient is advised of this and she will need a repeat beta quant in 2 days.  I have placed this order.  Also to help expedite follow-up care I discussed the case with on-call gynecologist Dr. Morrissey who will ensure that patient has follow-up.  Patient understands the importance of close follow-up.  I discussed ectopic pregnancy with patient and gave her return precautions regarding this.  I also gave her bleeding precautions for possible miscarriage.  She is then discharged in stable condition.    The patient will return for new or worsening symptoms and is stable at  the time of discharge.    The patient is referred to a primary physician for blood pressure management, diabetic screening, and for all other preventative health concerns.    DISPOSITION:  Patient will be discharged home in stable condition.    FOLLOW UP:  [facility] [place] [id number] [facility] [address]  [place] [address]  [phone]  Schedule an appointment as soon as possible for a visit in 2 days        FINAL DIANGOSIS  1. Threatened miscarriage in early pregnancy         Alonzo BROWNE (Janeibblake), am scribing for, and in the presence of, Katie Cohen M.D..    Electronically signed by: Alonzo Armenta (Micaela), 1/4/2023    Katie BROWNE M.D. personally performed the services described in this documentation, as scribed by Alonzo Armenta in my presence, and it is both accurate and complete.       The note accurately reflects work and decisions made by me.  Katie Cohen M.D.  1/4/2023  2:50 PM

## 2023-01-06 ENCOUNTER — HOSPITAL ENCOUNTER (OUTPATIENT)
Facility: MEDICAL CENTER | Age: 23
End: 2023-01-06
Attending: STUDENT IN AN ORGANIZED HEALTH CARE EDUCATION/TRAINING PROGRAM
Payer: COMMERCIAL

## 2023-01-06 ENCOUNTER — GYNECOLOGY VISIT (OUTPATIENT)
Dept: OBGYN | Facility: CLINIC | Age: 23
End: 2023-01-06
Payer: COMMERCIAL

## 2023-01-06 ENCOUNTER — HOSPITAL ENCOUNTER (OUTPATIENT)
Dept: LAB | Facility: MEDICAL CENTER | Age: 23
End: 2023-01-06
Attending: EMERGENCY MEDICINE
Payer: COMMERCIAL

## 2023-01-06 VITALS — DIASTOLIC BLOOD PRESSURE: 88 MMHG | BODY MASS INDEX: 25.97 KG/M2 | WEIGHT: 142 LBS | SYSTOLIC BLOOD PRESSURE: 142 MMHG

## 2023-01-06 DIAGNOSIS — O36.80X0 PREGNANCY OF UNKNOWN ANATOMIC LOCATION: ICD-10-CM

## 2023-01-06 DIAGNOSIS — O20.0 THREATENED MISCARRIAGE IN EARLY PREGNANCY: ICD-10-CM

## 2023-01-06 LAB
B-HCG SERPL-ACNC: 339 MIU/ML (ref 0–5)
PROGEST SERPL-MCNC: 2.36 NG/ML

## 2023-01-06 PROCEDURE — 84702 CHORIONIC GONADOTROPIN TEST: CPT

## 2023-01-06 PROCEDURE — 84144 ASSAY OF PROGESTERONE: CPT

## 2023-01-06 PROCEDURE — 99213 OFFICE O/P EST LOW 20 MIN: CPT | Mod: 25 | Performed by: STUDENT IN AN ORGANIZED HEALTH CARE EDUCATION/TRAINING PROGRAM

## 2023-01-06 PROCEDURE — 36415 COLL VENOUS BLD VENIPUNCTURE: CPT

## 2023-01-06 PROCEDURE — 76830 TRANSVAGINAL US NON-OB: CPT | Performed by: STUDENT IN AN ORGANIZED HEALTH CARE EDUCATION/TRAINING PROGRAM

## 2023-01-06 ASSESSMENT — FIBROSIS 4 INDEX: FIB4 SCORE: 0.79

## 2023-01-07 NOTE — PROGRESS NOTES
CC: Missed menses    Mare Eason is a 22 y.o.  who presents presents due to missed menses. Patient's last menstrual period was 2022 (exact date)..  She is sure of her LMP.  Based on LMP, she is 5w 6d today. She was not using anything for birth control.  This is was not a planned pregnancy.  She denies nausea, denies vomiting, reports dark brown vaginal bleeding/spotting, and denies cramping.  She was seen in the ED for vaginal spotting on .      OB History:    OB History    Para Term  AB Living   2 1   1   1   SAB IAB Ectopic Molar Multiple Live Births           0 1      # Outcome Date GA Lbr Adán/2nd Weight Sex Delivery Anes PTL Lv   2 Current            1  20 36w1d  4 lb 11.7 oz F CS-LTranv EPI, Spinal Y GUILHERME           Objective:   Vitals:  BP (!) 142/88 (BP Location: Right arm, Patient Position: Sitting)   Wt 142 lb   Body mass index is 25.97 kg/m². (Goal BMI>18 <25)  Exam:  General: is in no apparent distress  Psychiatric: appropriate affect, alert and oriented x3, intact judgment and insight.  Respiratory: normal effort  Female GYN: normal female external genitalia without lesions      Procedure:  Transvaginal US performed by me and per my read:    Indication: Amenorrhea, +UPT.     Findings:   Possible gestational sac measuring 0.32cm without yolk sac or fetal pole  Right ovary normal in appearance without large masses measuring 2.95 x 1.39 x 1.5cm.   Left Ovary normal in appearance without large masses measuring 2.29 x 1.76 x 1.8cm.  No concerning masses within bilateral adnexae   No free fluid in the cul-de-sac.      Impression:   Possible gestational sac measuring 0.32cm without yolk sac or fetal pole; pregnancy of unknown location      A/P:   22 y.o.  here for missed menses.     Pregnancy of unknown location    - Discussed today's findings with patient and partner. Still cannot rule out ectopic or non-viable pregnancy.  Recommend she have  her second HCG level drawn today along with progesterone level.   - SAB and ectopic precautions discussed.   - Discussed the size of our practice and that she will see multiple providers during the course of her care. She expresses understanding.     Follow up in 1 week for repeat viability scan.    Total Time: 20 minutes spent in chart review, exam, counseling and documention      Guilherme Bailey DO, FACOG

## 2023-01-09 ENCOUNTER — TELEPHONE (OUTPATIENT)
Dept: OBGYN | Facility: CLINIC | Age: 23
End: 2023-01-09
Payer: COMMERCIAL

## 2023-01-09 NOTE — TELEPHONE ENCOUNTER
01/09/23  8:58 AM  Yesterday she had cramping. She had bright red bleeding and clots. She reports heavy bleeding last night. Informed patient of decreasing HCG. Informed expect heavy menses, small plops of clot and painful rhythmic contractions (motrin at earliest onset).  Informed her if soaking through one or more pads per hour or six or more in a 12-hour period, or if she is light-headed, increased fatigue to please go to the nearest emergency room and not drive yourself. Patient agreed to plan. Confirmed appt for Monday. Will call as needed.

## 2023-01-19 ENCOUNTER — TELEPHONE (OUTPATIENT)
Dept: OBGYN | Facility: CLINIC | Age: 23
End: 2023-01-19

## 2023-03-20 ENCOUNTER — PATIENT MESSAGE (OUTPATIENT)
Dept: OBGYN | Facility: CLINIC | Age: 23
End: 2023-03-20

## 2023-03-20 NOTE — PATIENT COMMUNICATION
Pt states 2-3 days ago had had intercourse and had a brownish discharge but then stopped, notified pt of SAB precautions, and if she experiences any of these symptoms she should go to [facility] for further evaluation. Pt verbalized understanding and will comply.     Good morning, I have a question. I am 6 weeks and 4 days pregnant and I get on and off brown discharge is that a concern?

## 2023-04-07 ENCOUNTER — GYNECOLOGY VISIT (OUTPATIENT)
Dept: OBGYN | Facility: CLINIC | Age: 23
End: 2023-04-07
Payer: COMMERCIAL

## 2023-04-07 VITALS
BODY MASS INDEX: 24.84 KG/M2 | WEIGHT: 135 LBS | HEIGHT: 62 IN | DIASTOLIC BLOOD PRESSURE: 66 MMHG | SYSTOLIC BLOOD PRESSURE: 110 MMHG

## 2023-04-07 DIAGNOSIS — O21.9 NAUSEA/VOMITING IN PREGNANCY: Primary | ICD-10-CM

## 2023-04-07 DIAGNOSIS — Z87.59 HISTORY OF GESTATIONAL HYPERTENSION: ICD-10-CM

## 2023-04-07 DIAGNOSIS — N93.8 DUB (DYSFUNCTIONAL UTERINE BLEEDING): ICD-10-CM

## 2023-04-07 DIAGNOSIS — Z98.891 HISTORY OF C-SECTION: ICD-10-CM

## 2023-04-07 LAB
POCT INT CON NEG: NEGATIVE
POCT INT CON POS: POSITIVE
POCT URINE PREGNANCY TEST: POSITIVE

## 2023-04-07 PROCEDURE — 99213 OFFICE O/P EST LOW 20 MIN: CPT | Performed by: OBSTETRICS & GYNECOLOGY

## 2023-04-07 PROCEDURE — 81025 URINE PREGNANCY TEST: CPT | Performed by: OBSTETRICS & GYNECOLOGY

## 2023-04-07 RX ORDER — DOXYLAMINE SUCCINATE AND PYRIDOXINE HYDROCHLORIDE, DELAYED RELEASE TABLETS 10 MG/10 MG 10; 10 MG/1; MG/1
1 TABLET, DELAYED RELEASE ORAL 3 TIMES DAILY
Qty: 60 TABLET | Refills: 2 | Status: SHIPPED | OUTPATIENT
Start: 2023-04-07 | End: 2023-06-21

## 2023-04-07 RX ORDER — ASPIRIN 81 MG/1
81 TABLET ORAL DAILY
Qty: 90 TABLET | Refills: 2 | Status: SHIPPED | OUTPATIENT
Start: 2023-04-07 | End: 2023-07-24

## 2023-04-07 RX ORDER — ONDANSETRON 4 MG/1
4 TABLET, ORALLY DISINTEGRATING ORAL EVERY 6 HOURS PRN
Qty: 20 TABLET | Refills: 1 | Status: SHIPPED | OUTPATIENT
Start: 2023-04-07 | End: 2023-05-01

## 2023-04-07 ASSESSMENT — FIBROSIS 4 INDEX: FIB4 SCORE: 0.76

## 2023-04-07 NOTE — PROGRESS NOTES
Patient here for GYN/DUB.  UPT= Positive   LMP= 2/2/23   RIGO= 11/9/23 by LMP   GA= 9w1d by LMP   Last pap : Never   Pt states she started having N/V 4/6. Also says her mouth has been getting super watery.

## 2023-04-07 NOTE — PROGRESS NOTES
"CC: Missed menses and +UPT    Mare Eason is a 22 y.o.  who presents due to missed menses with a +UPT. Patient's last menstrual period was 2023 (exact date)..  She is sure of her LMP.  Based on LMP, she is 9w1d today. She was not using anything for birthcontrol.  This is was a planned pregnancy.  She reports nausea.  She also reports that her mouth is constantly watering.  She reports emesis.  Sometimes she has emesis multiple times a day. Other times it is only a few times a day. She denies vaginal bleeding or spotting.  She denies persistent cramping.      History of gestational hypertension and  in her prior pregnancy.    OB History:    OB History    Para Term  AB Living   3 1   1 1 1   SAB IAB Ectopic Molar Multiple Live Births   1       0 1      # Outcome Date GA Lbr Adán/2nd Weight Sex Delivery Anes PTL Lv   3 Current            2 SAB 2023 6w0d             Birth Comments: Passed on its own.   1  20 36w1d  4 lb 11.7 oz F CS-LTranv EPI, Spinal Y GUILHERME           Objective:   Vitals:  /66 (BP Location: Left arm, Patient Position: Sitting, BP Cuff Size: Adult)   Ht 5' 2\"   Wt 135 lb   Body mass index is 24.69 kg/m². (Goal BM I>18 <25)  Exam:  General: is in no apparent distress  Psychiatric: appropriate affect, alert and oriented x3, intact judgment and insight.  Respiratory: normal effort  Female GYN: deferred      Procedure:  Abdominal US performed by me and per my read:    Indication: Amenorrhea, +UPT.     Findings:   Alvarez intrauterine pregnancy @ 9w2d by CRL.   Positive yolk sac.   Positive fetal cardiac activity      Impression:   Viable IUP @ 9w2d.  RIGO by US of 2023.  RIGO by LMP: 2023  Final RIGO: 2023      A/P:   22 y.o.  here for pregnancy confirmation visit    Amenorrhea due to pregnancy.     - US today is c/w LMP. Final RIGO of 2023.     - Normal pregnancy s/sx discussed   - Advised prenatal vitamins, " healthy well rounded diet, adequate hydration, and continued exercise.     - Labs:     - PNL not ordered     - Counseled on aneuploidy and carrier screening tests:   She currently accepted aneuploidy screenings and accepted carrier screening   - Our practice is very large and you may see multiple priors during your care.    2.  History of gestational hypertension  - Recommend baseline PIH labs with routine prenatal labs  - Discussed taking aspirin starting at 12 weeks  -Blood pressure is normal in the office today.  She denies any history of chronic hypertension    3.  Nausea and vomiting of pregnancy  - Recommend small, frequent meals  -Recommend staying hydrated  - Diclegis and Zofran sent to her pharmacy    4.  History of     Follow up in 3-4 weeks for new OB visit.    Total Time: 20 minutes spent in chart review, exam, counseling and documention    Zee Garcia M.D.

## 2023-04-18 ENCOUNTER — PATIENT MESSAGE (OUTPATIENT)
Dept: OBGYN | Facility: CLINIC | Age: 23
End: 2023-04-18

## 2023-04-18 NOTE — PATIENT COMMUNICATION
Called pt to discuss her [employer] message about no being able to keep anything down. I left a VM and sent a [employer] message.

## 2023-04-24 ENCOUNTER — APPOINTMENT (OUTPATIENT)
Dept: OBGYN | Facility: CLINIC | Age: 23
End: 2023-04-24

## 2023-05-01 ENCOUNTER — INITIAL PRENATAL (OUTPATIENT)
Dept: OBGYN | Facility: CLINIC | Age: 23
End: 2023-05-01
Payer: COMMERCIAL

## 2023-05-01 VITALS — SYSTOLIC BLOOD PRESSURE: 132 MMHG | DIASTOLIC BLOOD PRESSURE: 84 MMHG | BODY MASS INDEX: 23.59 KG/M2 | WEIGHT: 129 LBS

## 2023-05-01 DIAGNOSIS — O09.892 SUPERVISION OF OTHER HIGH RISK PREGNANCIES, SECOND TRIMESTER: ICD-10-CM

## 2023-05-01 DIAGNOSIS — O21.0 HYPEREMESIS AFFECTING PREGNANCY, ANTEPARTUM: ICD-10-CM

## 2023-05-01 PROCEDURE — 0500F INITIAL PRENATAL CARE VISIT: CPT

## 2023-05-01 RX ORDER — ONDANSETRON 4 MG/1
4 TABLET, FILM COATED ORAL EVERY 4 HOURS PRN
Qty: 20 TABLET | Refills: 4 | Status: SHIPPED | OUTPATIENT
Start: 2023-05-01 | End: 2023-08-22

## 2023-05-01 ASSESSMENT — FIBROSIS 4 INDEX: FIB4 SCORE: 0.62

## 2023-05-01 NOTE — PROGRESS NOTES
Pt here for NOB apt.   Last pap : Today   Prenatal care : [facility]  LMP : 2/2/23  RIGO : 11/9/23 by LMP  DUB : on 4/7 @ 9w2d  GA Today : 12w5d  Pt states she has been having a lot of N/V . It keeps her from sleeping at night and she says she wakes up about every 4 hours to throw up. She also states having occasional chest pain.

## 2023-05-01 NOTE — PROGRESS NOTES
"CC: New Ob visit     Subjective Mare Eason  12w4d by LMP= 9w2d US presents for prenatal care. Today is her first prenatal visit at [facility].   I have reviewed the patients' medical, surgical, gynecological, obstetrical, social, and family histories, medications and available lab results and pertinent notes are as follows:   x4 ER visits for N/V, hyperemesis during the pregnancy  No previous care in this pregnancy.   She has complaints today of severe N/V, she can't sleep, vomits \"all day\".  She is able to eat and drink about an hour after zofran administration. She is very frustrated and tired of the N/V.   Genetic screening options: Reviewed all options including AFP, NIPTs, carrier screening, and no testing. She declines to choose today due to not feeling well, but would like to discuss again next visit.  Reports no FM. Denies VB, LOF, or cramping.  Denies dysuria, vaginal DC.    Pt is unmarried. Currently unemployed, no heavy lifting, no chemical exposure.    Pregnancy is unplanned but desired.      OB History    Para Term  AB Living   3 1 0 1 1 1   SAB IAB Ectopic Molar Multiple Live Births   1 0 0 0 0 1       Past Gynecological History:  Denies fibroids, ovarian cysts, or STDs.   She endorses a history of prior c/s x1    Last pap smear was n/a - today would be her first but she declines collection today due to feeling so sick with N/V.   Past OB hx includes 1 prior C/S at 36w1d for gHTN, did not labor, 1 prior SAB in January of this year - no D&C needed, passed spontaneously.   History of HSV I or II in self or partner: Denies  History of STIs in self or partner: Denies  History of Thyroid problems: Denies    History of depression or anxiety Denies, EPDS was not collected due to patient declines, Needs to be collected at next visit    Past Medical History:   Diagnosis Date    Head ache     Nausea/vomiting in pregnancy 2023    Physiological ovarian cysts " 2015     Past Surgical History:   Procedure Laterality Date    ID  DELIVERY ONLY N/A 2020    Procedure:  SECTION, PRIMARY;  Surgeon: Gianna Temple M.D.;  Location: LABOR AND DELIVERY;  Service: Obstetrics      Social History     Socioeconomic History    Marital status: Single     Spouse name: Not on file    Number of children: Not on file    Years of education: Not on file    Highest education level: Not on file   Occupational History    Not on file   Tobacco Use    Smoking status: Never    Smokeless tobacco: Never   Vaping Use    Vaping Use: Never used   Substance and Sexual Activity    Alcohol use: Not Currently     Comment: occ    Drug use: Yes     Types: Marijuana, Inhaled     Comment: Marijuana     Sexual activity: Not Currently     Partners: Male     Birth control/protection: None   Other Topics Concern    Not on file   Social History Narrative    Not on file     Social Determinants of Health     Financial Resource Strain: Not on file   Food Insecurity: Not on file   Transportation Needs: Not on file   Physical Activity: Not on file   Stress: Not on file   Social Connections: Not on file   Intimate Partner Violence: Not on file   Housing Stability: Not on file     Family History:   Family History   Problem Relation Age of Onset    No Known Problems Mother     No Known Problems Sister     No Known Problems Brother        Genetic Screening/Teratology Counseling- Includes patient, baby's father, or anyone in either family with:  Patient's age 35 years or older as of estimated date of delivery: No     Thalassemia (Italian, Greek, Mediterranean, or  background): MCV less than 80: No     Neural tube defect (Meningomyelocele, Spina bifida, or Anencephaly): No     Congenital heart defect: No     Down syndrome: No     Alonso-Sachs (Ashkenazi [employer], Cajun, [employer] Lodge): No     Canavan disease ([employer]kenazi Temple): No     Familial dysautonomia ([employer]kenazi Temple): No     Sickle cell  disease or trait (): No     Hemophilia or other blood disorders: No     Muscular dystrophy: No    Cystic fibrosis: No     Beaumont's chorea: No     Mental retardation/autism: No     Other inherited genetic or chromosomal disorder: No     Maternal metabolic disorder (eg. Type 1 diabetes, PKU): No     Patient or baby's father had child with birth defects not listed above: No     Recurrent pregnancy loss, or a stillbirth: No     Medications (including supplements, vitamins, herbs, or OTC drugs)/illicit/recreational drugs/alcohol since last menstrual period: No     Any other: FOB - brother has epilepsy             Infection Prevention  1. High Risk For HIV: No 6. Rash Or Illness Since LMP: No     2. High Risk For Hepatitis B or C: No 7. History Of STD, GC, Chlamydia, HPV Syphilis: No     3. Live With Someone With TB Or Exposed To TB: No 8. Have a cat in the home?: No     4. Patient Or Partner Has A History Of Herpes: No 8a. Responsible for changing the litter?: No     5. History of Chicken Pox: No 9. Other (See Comments Below): No   Comments: Pregnancy was unplanned but desired.  Pt is currently unemployed.             Objective:   Allergies: Patient has no known allergies.  Objective:/84   Wt 129 lb    Patient slumped over with wet cloth on head and abdomen as I presented to the room. Dry lips. Responds appropriately to questions but very withdrawn. Difficult ROS and patient intake due to limited response from patient.   Prenatal Physical:  General Exam:  HEENT: normal    Heart: normal    Lungs: normal    Neurological: normal    Abdomen: normal    Skin: normal    Extremities: normal    Pelvic Exam:  Uterus (wks):  14     Lab:   Recent Results (from the past 672 hour(s))   POCT Pregnancy    Collection Time: 04/07/23  9:55 AM   Result Value Ref Range    POC Urine Pregnancy Test Positive     Internal Control Positive Positive     Internal Control Negative Negative    COMP METABOLIC PANEL    Collection  Time: 04/18/23  2:28 PM   Result Value Ref Range    Sodium 136 136 - 145 mmol/L    Potassium 3.5 3.5 - 5.3 mmol/L    Chloride 104 98 - 110 mmol/L    Co2 23.0 20.0 - 31.0 mmol/L    Bun 7.0 6.0 - 24.0 mg/dL    Creatinine 0.9 0.5 - 1.0 mg/dL    Bun-Creatinine Ratio 7.8 (L) 10.0 - 20.0    Glucose, Bld 121 70 - 140 mg/dL    Calcium 10.0 8.6 - 10.4 mg/dL    AST(SGOT) 20 10 - 36 U/L    ALT(SGPT) 13 6 - 29 U/L    Alkaline Phosphatase 53 33 - 115 U/L    Total Protein 8.1 6.4 - 8.4 g/dL    Albumin (Mass/volume) in Synovial fluid External 5.0 3.6 - 5.1 g/dL    Total Bilirubin 0.6 0.2 - 1.2 mg/dL    Ag Ratio 1.6 0.8 - 2.0    Anion Gap 9.0 6 - 19 mmol/L    eGFR >90.0 >90.0 mL/min/1.73m*2   CBC WITH DIFFERENTIAL    Collection Time: 04/18/23  2:28 PM   Result Value Ref Range    WBC 10.20 (H) 3.40 - 10.00 10*3/uL    RBC 4.46 3.90 - 5.03 10*6/uL    Hemoglobin 12.8 12.0 - 15.5 g/dL    Hematocrit 38.7 34.9 - 44.5 %    MCV 86.8 81.6 - 98.3 fL    MCH 28.7 27.5 - 33.2 pg    MCHC 33.1 (L) 33.4 - 35.5 g/dL    RDW 13.8 11.8 - 15.6 %    Platelet Count 198 150 - 450 10*3/uL    MPV 9.3 7.4 - 11.0 fL    Neutrophils-Polys 85.6 %    Lymphocytes 10.4 %    Monocytes 3.8 %    Eosinophils 0.0 %    Basophils 0.2 %    Neutrophils (Absolute) 8.8 (H) 1.7 - 7.0 10*3/uL    Lymphs (Absolute) 1.1 0.9 - 2.9 10*3/uL    Monos (Absolute) 0.4 0.3 - 0.9 10*3/uL    Eos (Absolute) 0.0 0.0 - 0.5 10*3/uL    Baso (Absolute) 0.0 0.0 - 0.2 10*3/uL       Ultrasound:  Reviewed initial scan and RIGO is by LMP c/w 9w2d US    Assessment:  --Normal Exam at 12 weeks and 4 days  --Size consistent with dates  --FHR positive  --hyperemesis gravidarum  Encounter Diagnoses   Name Primary?    Supervision of other high risk pregnancies, second trimester     Hyperemesis affecting pregnancy, antepartum         Plan:  Reviewed the patients risk factors for this pregnancy and recommend the need for   Complete OB US in: 7-8 wks  Additional labs/imaging: early CMP and P:C ratio ordered due  to hx of gestational HTN  Antepartum fetal monitoring requirements: Routine at this time  Advised ASA 81mg daily due to hx of gHTN, previously prescribed but not taking. We discussed the benefits of ASA in prevention of PIH  2. Genetic screening was discussed with literature on Prenatal Screening, Cystic Fibrosis, and SMA provided and the patient plans to:  - decide by next visit, unable to decide today  3. Discuss importance of adequate water intake, taking PNV, healthy nutritional choices, and avoidence of ETOH/Tobacco/drugs  4. Discuss importance of exercise, as well as rest   5. If has nausea, take Vitamin B6 25mg TID with doxylamine/Unisom 25mg at night  6. Prenatal labs and UDS ordered - lab slip given  7. Discussed OB care at [facility] including midwifery care, group practice, and call schedules  8. GC/CT will be collected on pap at next visit or else send via urine.    9. Pap to be collected at next visit per patient preference.  10. Pregnancy guide provided and reviewed safe medications in pregnancy page  11. Follow up in  4 weeks for next visit and PRN  12. Advised reasons to RTC for N/V and symptoms for which to go to the ED. Continue to use zofran PRN and start unisom and B6 protocol.       Mariaelena Gerardo C.N.M.

## 2023-05-19 RX ORDER — ASPIRIN 81 MG
81 TABLET,CHEWABLE ORAL
COMMUNITY
Start: 2023-04-12 | End: 2023-07-24

## 2023-05-19 RX ORDER — ONDANSETRON 8 MG/1
TABLET, ORALLY DISINTEGRATING ORAL
COMMUNITY
Start: 2023-04-18 | End: 2023-08-22

## 2023-06-09 ENCOUNTER — ROUTINE PRENATAL (OUTPATIENT)
Dept: OBGYN | Facility: CLINIC | Age: 23
End: 2023-06-09
Payer: COMMERCIAL

## 2023-06-09 VITALS — SYSTOLIC BLOOD PRESSURE: 120 MMHG | WEIGHT: 128 LBS | BODY MASS INDEX: 23.41 KG/M2 | DIASTOLIC BLOOD PRESSURE: 70 MMHG

## 2023-06-09 DIAGNOSIS — O09.892 SUPERVISION OF OTHER HIGH RISK PREGNANCIES, SECOND TRIMESTER: Primary | ICD-10-CM

## 2023-06-09 PROCEDURE — 3074F SYST BP LT 130 MM HG: CPT | Performed by: NURSE PRACTITIONER

## 2023-06-09 PROCEDURE — 0502F SUBSEQUENT PRENATAL CARE: CPT | Performed by: NURSE PRACTITIONER

## 2023-06-09 PROCEDURE — 3078F DIAST BP <80 MM HG: CPT | Performed by: NURSE PRACTITIONER

## 2023-06-09 RX ORDER — ONDANSETRON 4 MG/1
4 TABLET, ORALLY DISINTEGRATING ORAL EVERY 6 HOURS PRN
Qty: 28 TABLET | Refills: 0 | Status: SHIPPED | OUTPATIENT
Start: 2023-06-09 | End: 2023-06-09 | Stop reason: SINTOL

## 2023-06-09 RX ORDER — METOCLOPRAMIDE 10 MG/1
10 TABLET ORAL 4 TIMES DAILY
Qty: 120 TABLET | Refills: 0 | Status: SHIPPED | OUTPATIENT
Start: 2023-06-09 | End: 2023-08-22

## 2023-06-09 ASSESSMENT — EDINBURGH POSTNATAL DEPRESSION SCALE (EPDS)
I HAVE BLAMED MYSELF UNNECESSARILY WHEN THINGS WENT WRONG: NOT VERY OFTEN
THE THOUGHT OF HARMING MYSELF HAS OCCURRED TO ME: NEVER
THINGS HAVE BEEN GETTING ON TOP OF ME: NO, MOST OF THE TIME I HAVE COPED QUITE WELL
I HAVE FELT SCARED OR PANICKY FOR NO GOOD REASON: NO, NOT AT ALL
TOTAL SCORE: 5
I HAVE FELT SAD OR MISERABLE: NO, NOT AT ALL
I HAVE BEEN ABLE TO LAUGH AND SEE THE FUNNY SIDE OF THINGS: NOT QUITE SO MUCH NOW
I HAVE BEEN SO UNHAPPY THAT I HAVE BEEN CRYING: ONLY OCCASIONALLY
I HAVE BEEN SO UNHAPPY THAT I HAVE HAD DIFFICULTY SLEEPING: NOT AT ALL
I HAVE BEEN ANXIOUS OR WORRIED FOR NO GOOD REASON: NO, NOT AT ALL
I HAVE LOOKED FORWARD WITH ENJOYMENT TO THINGS: RATHER LESS THAN I USED TO

## 2023-06-09 ASSESSMENT — FIBROSIS 4 INDEX: FIB4 SCORE: 0.65

## 2023-06-09 NOTE — PROGRESS NOTES
Pt here today for OB follow up  Pt states feeling nauseas. No VB, LOF, UC'S. Nipples hurt, Zofran helping a little bit. Pt states every time she feels nauseas she feels like she has a fever   Good # [phone]  Pharmacy Confirmed.  Chaperone offered and declined    6/26/23   Prenatal panel will be done next week   PAP offered and declined

## 2023-06-26 ENCOUNTER — APPOINTMENT (OUTPATIENT)
Dept: RADIOLOGY | Facility: IMAGING CENTER | Age: 23
End: 2023-06-26
Payer: COMMERCIAL

## 2023-06-26 DIAGNOSIS — O09.892 SUPERVISION OF OTHER HIGH RISK PREGNANCIES, SECOND TRIMESTER: ICD-10-CM

## 2023-06-26 PROCEDURE — 76805 OB US >/= 14 WKS SNGL FETUS: CPT | Mod: TC

## 2023-06-27 DIAGNOSIS — O28.3 ABNORMAL FETAL ULTRASOUND: ICD-10-CM

## 2023-06-29 ENCOUNTER — TELEPHONE (OUTPATIENT)
Dept: OBGYN | Facility: CLINIC | Age: 23
End: 2023-06-29

## 2023-07-03 ENCOUNTER — TELEPHONE (OUTPATIENT)
Dept: OBGYN | Facility: CLINIC | Age: 23
End: 2023-07-03

## 2023-07-03 DIAGNOSIS — Z34.90 PREGNANCY, UNSPECIFIED GESTATIONAL AGE: ICD-10-CM

## 2023-07-03 NOTE — TELEPHONE ENCOUNTER
----- Message from Mariaelena Gerardo C.N.M. sent at 6/27/2023  8:15 AM PDT -----  Small fetal head measurements on US, please notify (about a week behind the other measurements), I've placed a referral to repeat the US and consult in [facility]. I would recommend genetic testing due to the EIF and measurements, She can do the NIPT if she desires - please let me know and I will order it and she can come by to get the box. Thanks.       07/03/23  1214 called pt's on the number we have on file but no answer and it said not able to receive calls at this time.   Pt had provided a different number to MA on her last visit, called pt and informed as above. Pt agreed  to do NIPT test and will come and pick kit on Wednesday 7/5/2023, explained to pt our [facility] office tried to call her as well but have not been able to contact. Pt states this new number is the best at this time. Ximena BARRON at [facility] office notified of pt's new number via teams.

## 2023-07-05 ENCOUNTER — HOSPITAL ENCOUNTER (OUTPATIENT)
Dept: LAB | Facility: MEDICAL CENTER | Age: 23
End: 2023-07-05
Payer: COMMERCIAL

## 2023-07-05 DIAGNOSIS — O09.892 SUPERVISION OF OTHER HIGH RISK PREGNANCIES, SECOND TRIMESTER: ICD-10-CM

## 2023-07-05 LAB
ABO GROUP BLD: NORMAL
ALBUMIN SERPL BCP-MCNC: 3.8 G/DL (ref 3.2–4.9)
ALBUMIN/GLOB SERPL: 1.3 G/DL
ALP SERPL-CCNC: 50 U/L (ref 30–99)
ALT SERPL-CCNC: 14 U/L (ref 2–50)
ANION GAP SERPL CALC-SCNC: 12 MMOL/L (ref 7–16)
AST SERPL-CCNC: 14 U/L (ref 12–45)
BILIRUB SERPL-MCNC: 0.3 MG/DL (ref 0.1–1.5)
BLD GP AB SCN SERPL QL: NORMAL
BUN SERPL-MCNC: 5 MG/DL (ref 8–22)
CALCIUM ALBUM COR SERPL-MCNC: 9.1 MG/DL (ref 8.5–10.5)
CALCIUM SERPL-MCNC: 8.9 MG/DL (ref 8.5–10.5)
CHLORIDE SERPL-SCNC: 98 MMOL/L (ref 96–112)
CO2 SERPL-SCNC: 25 MMOL/L (ref 20–33)
CREAT SERPL-MCNC: 0.61 MG/DL (ref 0.5–1.4)
ERYTHROCYTE [DISTWIDTH] IN BLOOD BY AUTOMATED COUNT: 44.4 FL (ref 35.9–50)
GFR SERPLBLD CREATININE-BSD FMLA CKD-EPI: 129 ML/MIN/1.73 M 2
GLOBULIN SER CALC-MCNC: 3 G/DL (ref 1.9–3.5)
GLUCOSE SERPL-MCNC: 84 MG/DL (ref 65–99)
HBV SURFACE AG SER QL: NORMAL
HCT VFR BLD AUTO: 40.1 % (ref 37–47)
HCV AB SER QL: NORMAL
HGB BLD-MCNC: 13.1 G/DL (ref 12–16)
HIV 1+2 AB+HIV1 P24 AG SERPL QL IA: NORMAL
MCH RBC QN AUTO: 29.2 PG (ref 27–33)
MCHC RBC AUTO-ENTMCNC: 32.7 G/DL (ref 32.2–35.5)
MCV RBC AUTO: 89.3 FL (ref 81.4–97.8)
PLATELET # BLD AUTO: 175 K/UL (ref 164–446)
PMV BLD AUTO: 12.6 FL (ref 9–12.9)
POTASSIUM SERPL-SCNC: 3.7 MMOL/L (ref 3.6–5.5)
PROT SERPL-MCNC: 6.8 G/DL (ref 6–8.2)
RBC # BLD AUTO: 4.49 M/UL (ref 4.2–5.4)
RH BLD: NORMAL
RUBV AB SER QL: 48.1 IU/ML
SODIUM SERPL-SCNC: 135 MMOL/L (ref 135–145)
T PALLIDUM AB SER QL IA: NORMAL
WBC # BLD AUTO: 6.8 K/UL (ref 4.8–10.8)

## 2023-07-05 PROCEDURE — 86762 RUBELLA ANTIBODY: CPT

## 2023-07-05 PROCEDURE — 80053 COMPREHEN METABOLIC PANEL: CPT

## 2023-07-05 PROCEDURE — 84156 ASSAY OF PROTEIN URINE: CPT | Mod: XU

## 2023-07-05 PROCEDURE — 36415 COLL VENOUS BLD VENIPUNCTURE: CPT

## 2023-07-05 PROCEDURE — 86901 BLOOD TYPING SEROLOGIC RH(D): CPT

## 2023-07-05 PROCEDURE — 87389 HIV-1 AG W/HIV-1&-2 AB AG IA: CPT

## 2023-07-05 PROCEDURE — 86850 RBC ANTIBODY SCREEN: CPT

## 2023-07-05 PROCEDURE — 87086 URINE CULTURE/COLONY COUNT: CPT

## 2023-07-05 PROCEDURE — 86780 TREPONEMA PALLIDUM: CPT

## 2023-07-05 PROCEDURE — 86803 HEPATITIS C AB TEST: CPT

## 2023-07-05 PROCEDURE — 82570 ASSAY OF URINE CREATININE: CPT | Mod: XU

## 2023-07-05 PROCEDURE — 85027 COMPLETE CBC AUTOMATED: CPT

## 2023-07-05 PROCEDURE — 87340 HEPATITIS B SURFACE AG IA: CPT

## 2023-07-05 PROCEDURE — 86900 BLOOD TYPING SEROLOGIC ABO: CPT

## 2023-07-05 PROCEDURE — 80307 DRUG TEST PRSMV CHEM ANLYZR: CPT

## 2023-07-06 LAB
CREAT UR-MCNC: 107.03 MG/DL
PROT UR-MCNC: 12 MG/DL (ref 0–15)
PROT/CREAT UR: 112 MG/G (ref 10–107)

## 2023-07-07 LAB
AMPHET CTO UR CFM-MCNC: NEGATIVE NG/ML
BARBITURATES CTO UR CFM-MCNC: NEGATIVE NG/ML
BENZODIAZ CTO UR CFM-MCNC: NEGATIVE NG/ML
CANNABINOIDS CTO UR CFM-MCNC: POSITIVE NG/ML
COCAINE CTO UR CFM-MCNC: NEGATIVE NG/ML
DRUG COMMENT 753798: NORMAL
METHADONE CTO UR CFM-MCNC: NEGATIVE NG/ML
OPIATES CTO UR CFM-MCNC: NEGATIVE NG/ML
PCP CTO UR CFM-MCNC: NEGATIVE NG/ML
PROPOXYPH CTO UR CFM-MCNC: NEGATIVE NG/ML

## 2023-07-08 LAB
BACTERIA UR CULT: NORMAL
SIGNIFICANT IND 70042: NORMAL
SITE SITE: NORMAL
SOURCE SOURCE: NORMAL

## 2023-07-09 LAB — THC UR CFM-MCNC: >500 NG/ML

## 2023-07-17 DIAGNOSIS — Z34.90 PREGNANCY, UNSPECIFIED GESTATIONAL AGE: ICD-10-CM

## 2023-07-24 ENCOUNTER — ROUTINE PRENATAL (OUTPATIENT)
Dept: OBGYN | Facility: CLINIC | Age: 23
End: 2023-07-24
Payer: COMMERCIAL

## 2023-07-24 VITALS — SYSTOLIC BLOOD PRESSURE: 102 MMHG | DIASTOLIC BLOOD PRESSURE: 72 MMHG | BODY MASS INDEX: 22.68 KG/M2 | WEIGHT: 124 LBS

## 2023-07-24 DIAGNOSIS — Z98.891 HISTORY OF C-SECTION: ICD-10-CM

## 2023-07-24 DIAGNOSIS — O35.BXX0 ECHOGENIC FOCUS OF HEART OF FETUS AFFECTING ANTEPARTUM CARE OF MOTHER, SINGLE OR UNSPECIFIED FETUS: ICD-10-CM

## 2023-07-24 DIAGNOSIS — Z87.59 HISTORY OF PRE-ECLAMPSIA: ICD-10-CM

## 2023-07-24 PROCEDURE — 3078F DIAST BP <80 MM HG: CPT | Performed by: NURSE PRACTITIONER

## 2023-07-24 PROCEDURE — 0502F SUBSEQUENT PRENATAL CARE: CPT | Performed by: NURSE PRACTITIONER

## 2023-07-24 PROCEDURE — 3074F SYST BP LT 130 MM HG: CPT | Performed by: NURSE PRACTITIONER

## 2023-07-24 ASSESSMENT — FIBROSIS 4 INDEX: FIB4 SCORE: 0.49

## 2023-07-24 NOTE — PROGRESS NOTES
Pt. Here for OB/FU.   Reports Good FM.   Pt. Denies VB, LOF, or UC's.   Chaperone offered and indicated.   NIPT results in labs/media.   Pt states she went to pharmacy to  Zofran and they told her they did not have any ready.

## 2023-07-24 NOTE — PROGRESS NOTES
SUBJECTIVE:  Pt is a 23 y.o.   at 24w4d  gestation. Presents today for follow-up prenatal care. Reports a lot of  fetal movement. Denies regular cramping/contractions, bleeding or leaking of fluid. Denies dysuria, headaches, N/V. Generally feels well today.      OBJECTIVE:  - See prenatal vitals flow  -   Vitals:    23 1402   BP: 102/72   Weight: 124 lb                 ASSESSMENT:   - IUP at 24w4d    - S=D   -   Patient Active Problem List    Diagnosis Date Noted    History of pre-eclampsia w/ severe features 2023    Echogenic focus of heart of fetus affecting antepartum care of mother 2023    Supervision of other high risk pregnancies, second trimester 2023    History of  2020         PLAN:  - S/sx pregnancy and labor warning signs vs general discomforts discussed  - Fetal movements and/or kick counts reviewed   - Adequate hydration reinforced  - Nutrition/exercise/vitamin education; continue PNV  - Third tri labs ordered  - Unsure if desires repeat c/s of TOLAC  - NIPT low risk   - Anticipatory guidance given  - Her N/V is improved, will be picking up refill on zofran soon   - RTC in 4 weeks for follow-up prenatal care/TOLAC counseling       Price (Do Not Change): 0.00 Instructions: This plan will send the code FBSD to the PM system.  DO NOT or CHANGE the price. Detail Level: Simple

## 2023-08-22 ENCOUNTER — ROUTINE PRENATAL (OUTPATIENT)
Dept: OBGYN | Facility: CLINIC | Age: 23
End: 2023-08-22
Payer: COMMERCIAL

## 2023-08-22 VITALS — BODY MASS INDEX: 23.59 KG/M2 | WEIGHT: 129 LBS | DIASTOLIC BLOOD PRESSURE: 70 MMHG | SYSTOLIC BLOOD PRESSURE: 100 MMHG

## 2023-08-22 DIAGNOSIS — Z34.83 PRENATAL CARE, SUBSEQUENT PREGNANCY IN THIRD TRIMESTER: Primary | ICD-10-CM

## 2023-08-22 DIAGNOSIS — Z98.891 HISTORY OF C-SECTION: ICD-10-CM

## 2023-08-22 PROCEDURE — 0502F SUBSEQUENT PRENATAL CARE: CPT | Performed by: OBSTETRICS & GYNECOLOGY

## 2023-08-22 PROCEDURE — 3074F SYST BP LT 130 MM HG: CPT | Performed by: OBSTETRICS & GYNECOLOGY

## 2023-08-22 PROCEDURE — 3078F DIAST BP <80 MM HG: CPT | Performed by: OBSTETRICS & GYNECOLOGY

## 2023-08-22 ASSESSMENT — FIBROSIS 4 INDEX: FIB4 SCORE: 0.49

## 2023-08-22 NOTE — LETTER
"Count Your Baby's Movements  Another step to a healthy delivery    Lety Eason            Dept: [phone]    How Many Weeks Pregnant=28w5d    Date to Begin Countin23              How to use this chart    One way for your physician to keep track of your baby's health is by knowing how often the baby moves (or \"kicks\") in your womb.  You can help your physician to do this by using this chart every day.    Every day, you should see how many hours it takes for your baby to move 10 times.  Start in the morning, as soon as you get up.    First, write down the time your baby moves until you get to 10.  Check off one box every time your baby moves until you get to 10.  Write down the time you finished counting in the last column.  Total how long it took to count up all 10 movements.  Finally, fill in the box that shows how long this took.  After counting 10 movements, you no longer have to count any more that day.  The next morning, just start counting again as soon as you get up.    What should you call a \"movement\"?  It is hard to say, because it will feel different from one mother to another and from one pregnancy to the next.  The important thing is that you count the movements the same way throughout your pregnancy.  If you have more questions, you should ask your physician.    Count carefully every day!  SAMPLE:  Week 28    How many hours did it take to feel 10 movements?       Start  Time     1     2     3     4     5     6     7     8     9     10   Finish Time   Mon 8:20           11:40                  Fri               Sat               Sun                 IMPORTANT: You should contact your physician if it takes more than two hours for you to feel 10 movements.  Each morning, write down the time and start to count the movements of your baby.  Keep track by checking off one box every time you feel one movement.  When you have felt 10 \"kicks\", write down the time " you finished counting in the last column.  Then fill in the   box (over the check shaka) for the number of hours it took.  Be sure to read the complete instructions on the previous page.

## 2023-08-22 NOTE — PROGRESS NOTES
OB follow up   + fetal movement.  No VB, LOF or UC's.  Phone # [phone]  Preferred pharmacy confirmed.  3rd trimester labs not done yet, will do next weeks  Kick count sheet given today.  BTL declined  TDap declined

## 2023-09-05 NOTE — PROGRESS NOTES
S:   Mare is a 23 y.o.  at 30w6d. Her RIGO is 2023, by Last Menstrual Period. She is here for routine OB follow up.  Reports positive FM.  Denies VB, LOF, RUCs or vaginal DC. No concerns. Feels well overall.    Current Outpatient Medications on File Prior to Visit   Medication Sig Dispense Refill    Prenatal Multivit-Min-Fe-FA (PRENATAL VITAMINS) 0.8 MG Tab Take 1 Tab by mouth every day. 30 Tab 3     No current facility-administered medications on file prior to visit.       O:    Vitals:    23 0903   BP: 122/76   Weight: 130 lb       FH: 28 cm  FHT: 135 BPM    See flow sheet.    A:   IUP 30w6d  S=D  Patient Active Problem List    Diagnosis Date Noted    History of pre-eclampsia w/ severe features 2023    Echogenic focus of heart of fetus affecting antepartum care of mother 2023    Supervision of other high risk pregnancies, second trimester 2023    History of  2020       P:   PP contraception plan: declines PP contraception.  Continue FKCs.    Questions answered. Anticipatory guidance.  Encourage adequate water intake.  Counseled on importance of lab work in pregnancy - encouraged to collect ASAP - pt verbalized understanding   labor and return precautions reviewed - patient verbalized understanding.  Encouraged patient to schedule appointment with Dr. Ondina THAKUR - has not done so yet. Phone number given to patient. Ultrasound reviewed with patient.  F/u 2 wks and PRN    No orders of the defined types were placed in this encounter.       Pregnancy Checklist  Prenatal labs: PNL WNL, baseline pre-eclampsia labs WNL, + cannabinoids  EPDS: patient declined  Last Pap: patient declined at NOB  Quad screen/NIPT/MASFP/Carrier Screen: NIPT low risk  Anatomy US: BPD 2%, HC 4%  3rd trimester labs: not collected as of 23  Tdap: declined 23  Flu vaccine: not in flu season  Rhogam: N/A O+  PP Contraception plan: declined 23  Bilateral salpingectomy: declined  8/22/23  GBS:   Hx C/S: yes, x1  Desires TOLAC?: no  Repeat C/S scheduled for:    ANTON LuceroNMARIA ISABEL

## 2023-09-06 ENCOUNTER — ROUTINE PRENATAL (OUTPATIENT)
Dept: OBGYN | Facility: CLINIC | Age: 23
End: 2023-09-06
Payer: COMMERCIAL

## 2023-09-06 VITALS — SYSTOLIC BLOOD PRESSURE: 122 MMHG | BODY MASS INDEX: 23.78 KG/M2 | WEIGHT: 130 LBS | DIASTOLIC BLOOD PRESSURE: 76 MMHG

## 2023-09-06 DIAGNOSIS — O09.93 SUPERVISION OF HIGH RISK PREGNANCY IN THIRD TRIMESTER: ICD-10-CM

## 2023-09-06 PROCEDURE — 0502F SUBSEQUENT PRENATAL CARE: CPT

## 2023-09-06 PROCEDURE — 3078F DIAST BP <80 MM HG: CPT

## 2023-09-06 PROCEDURE — 3074F SYST BP LT 130 MM HG: CPT

## 2023-09-06 ASSESSMENT — FIBROSIS 4 INDEX: FIB4 SCORE: 0.49

## 2023-09-06 NOTE — PROGRESS NOTES
Pt here today for OB follow up  Reports +FM  WT: 130.0 lb   BP: 122/76  Preferred pharmacy verified with pt.  MFM Appointment: not at this time   Pt states having a sneeze and a runny nose. States no other complaints or concerns today  Good # [phone]    3rd trimester labs not yet done, pt states she will try getting the, done tomorrow.

## 2023-09-19 NOTE — PROGRESS NOTES
S:   Mare is a 23 y.o.  at 32w6d. Her RIGO is 2023, by Last Menstrual Period. She is here for routine OB follow up.  Reports positive FM.  Denies VB, LOF, RUCs or vaginal DC. Has not gotten her 3rd trimester labs drawn. Stated she called Dr. Nj's office to schedule an appointment and Dr. Nj's office stated they did not receive a referral.    Current Outpatient Medications on File Prior to Visit   Medication Sig Dispense Refill    Prenatal Multivit-Min-Fe-FA (PRENATAL VITAMINS) 0.8 MG Tab Take 1 Tab by mouth every day. 30 Tab 3     No current facility-administered medications on file prior to visit.       O:    Vitals:    23 1056   BP: 126/68   Weight: 130 lb 12.8 oz       FH: 32 cm  FHT: 132 BPM    See flow sheet.    A:   IUP 32w6d  S=D  Patient Active Problem List    Diagnosis Date Noted    History of pre-eclampsia w/ severe features 2023    Echogenic focus of heart of fetus affecting antepartum care of mother 2023    Supervision of other high risk pregnancies, second trimester 2023    History of  2020       P:    Continue FKCs.    Questions answered. Anticipatory guidance.   Encourage adequate water intake.  Reviewed  labor and return precautions - patient verbalized understanding.  Active perinatology referral noted in EMR. However, referral re-submitted today to ensure pt can be seen. Pt instructed to call Dr. Nj's office in 2 days to schedule visit. Verbalized understanding.  Counseled on importance of lab work in pregnancy - encouraged to collect ASAP. 3T lab slips re-printed and given to patient - pt verbalized understanding  F/u 2 wks and PRN    Orders Placed This Encounter    Referral to Perinatology       Pregnancy Checklist  Prenatal labs: PNL WNL, baseline pre-eclampsia labs WNL, + cannabinoids  EPDS: patient declined  Last Pap: patient declined at NOB  Quad screen/NIPT/MASFP/Carrier Screen: NIPT low risk  Anatomy US: BPD 2%, HC 4%  3rd  trimester labs: not collected as of 9/20/23  Tdap: declined 8/22/23  Flu vaccine: not in flu season  Rhogam: N/A O+  PP Contraception plan: declined 9/6/23  Bilateral salpingectomy: declined 8/22/23  GBS:   Hx C/S: yes, x1  Desires TOLAC?: no  Repeat C/S scheduled for:    Mari Soto C.N.M.

## 2023-09-20 ENCOUNTER — ROUTINE PRENATAL (OUTPATIENT)
Dept: OBGYN | Facility: CLINIC | Age: 23
End: 2023-09-20
Payer: COMMERCIAL

## 2023-09-20 VITALS — WEIGHT: 130.8 LBS | BODY MASS INDEX: 23.92 KG/M2 | SYSTOLIC BLOOD PRESSURE: 126 MMHG | DIASTOLIC BLOOD PRESSURE: 68 MMHG

## 2023-09-20 DIAGNOSIS — O28.3 ABNORMAL FETAL ULTRASOUND: ICD-10-CM

## 2023-09-20 DIAGNOSIS — O09.93 SUPERVISION OF HIGH RISK PREGNANCY IN THIRD TRIMESTER: ICD-10-CM

## 2023-09-20 PROCEDURE — 3078F DIAST BP <80 MM HG: CPT

## 2023-09-20 PROCEDURE — 3074F SYST BP LT 130 MM HG: CPT

## 2023-09-20 PROCEDURE — 0502F SUBSEQUENT PRENATAL CARE: CPT

## 2023-09-20 ASSESSMENT — FIBROSIS 4 INDEX: FIB4 SCORE: 0.49

## 2023-09-20 NOTE — PROGRESS NOTES
Pt here today for OB follow up  Reports +FM  WT: 130.8 lb  BP: 126/68  Preferred pharmacy verified with pt.  MFM Appointment: Pt states was not able to make appointment with Dr. Larsen, she was told by his office that they did not receive a referral anf to let us know.   Good # [phone] (Asia Meredith mom's cell phone number, pt pt it's ok to leave a message at this #)  Pt declines the Tdao vaccine     Last US in office: 06/26/23    3rd trimester labs not yet done. Lab slips reprinted for pt and instructions given

## 2023-10-04 ENCOUNTER — HOSPITAL ENCOUNTER (OUTPATIENT)
Dept: LAB | Facility: MEDICAL CENTER | Age: 23
End: 2023-10-04
Attending: NURSE PRACTITIONER
Payer: COMMERCIAL

## 2023-10-04 ENCOUNTER — ROUTINE PRENATAL (OUTPATIENT)
Dept: OBGYN | Facility: CLINIC | Age: 23
End: 2023-10-04
Payer: COMMERCIAL

## 2023-10-04 ENCOUNTER — HOSPITAL ENCOUNTER (OUTPATIENT)
Dept: LAB | Facility: MEDICAL CENTER | Age: 23
End: 2023-10-04
Attending: STUDENT IN AN ORGANIZED HEALTH CARE EDUCATION/TRAINING PROGRAM
Payer: COMMERCIAL

## 2023-10-04 VITALS — BODY MASS INDEX: 23.96 KG/M2 | DIASTOLIC BLOOD PRESSURE: 92 MMHG | SYSTOLIC BLOOD PRESSURE: 142 MMHG | WEIGHT: 131 LBS

## 2023-10-04 DIAGNOSIS — O09.93 SUPERVISION OF HIGH RISK PREGNANCY IN THIRD TRIMESTER: ICD-10-CM

## 2023-10-04 DIAGNOSIS — Z87.59 HISTORY OF PRE-ECLAMPSIA: ICD-10-CM

## 2023-10-04 DIAGNOSIS — O35.BXX0 ECHOGENIC FOCUS OF HEART OF FETUS AFFECTING ANTEPARTUM CARE OF MOTHER, SINGLE OR UNSPECIFIED FETUS: ICD-10-CM

## 2023-10-04 DIAGNOSIS — Z98.891 HISTORY OF C-SECTION: ICD-10-CM

## 2023-10-04 PROCEDURE — 3080F DIAST BP >= 90 MM HG: CPT | Performed by: STUDENT IN AN ORGANIZED HEALTH CARE EDUCATION/TRAINING PROGRAM

## 2023-10-04 PROCEDURE — 3077F SYST BP >= 140 MM HG: CPT | Performed by: STUDENT IN AN ORGANIZED HEALTH CARE EDUCATION/TRAINING PROGRAM

## 2023-10-04 PROCEDURE — 0502F SUBSEQUENT PRENATAL CARE: CPT | Performed by: STUDENT IN AN ORGANIZED HEALTH CARE EDUCATION/TRAINING PROGRAM

## 2023-10-04 ASSESSMENT — FIBROSIS 4 INDEX: FIB4 SCORE: 0.49

## 2023-10-04 NOTE — PROGRESS NOTES
OB Visit Note - 34w6d     MEDICAL DECISION MAKING:  Mare Eason is a 23 y.o. female  at 34w6d     S: Pt presents for routine OB follow up. Good fetal movement. No contractions, vaginal bleeding, or leakage of fluid.  Has appt with Dr. Nj on 10/11.  She has an appt at the lab today for her 3rd trimester labs.  Denies any headaches, visual changes, RUQ pain.  Has h/o pre-e with severe features at 35 weeks in last pregnancy.  Has not been taking daily aspirin.  Declines flu vaccine.      Questions answered.     O:  Vitals:    10/04/23 1024   BP: (!) 142/92     Patients' weight gain, fluid intake and exercise level discussed.    FH: 33 cm  FHT: 128 bpm       See flow sheet.        Recent Labs     23  1302   ABOGROUP O   ABSCRN NEG   HEMOGLOBIN 13.1   PLATELETCT 175   RUBELLAIGG 48.10   HEPBSAG Non-Reactive   HEPCAB Non-Reactive          A/P:Mare Eason is a 23 y.o. female  at 34w6d who presents for routine obstetric follow-up.    P:  Continue FKCs.    Keep all appts with Dr. Nj; next appt 10/11/2023  Declines flu vaccine  Patient to lab today for 3rd trimester labs; pre-e labs added on today as well  Labor and pre-e return precautions given.  Instructions given on where to go - patient verbalized understanding.  All questions answered. Anticipatory guidance.  Encouraged adequate water intake.  PP contraception: undecided  GBS at next visit  Repeat  at 39 weeks if no development of GHTN or pre-e  F/u in 1-2 week(s) and PRN    Pregnancy complicated by:  Patient Active Problem List   Diagnosis    History of     Supervision of other high risk pregnancies, second trimester    History of pre-eclampsia w/ severe features    Echogenic focus of heart of fetus affecting antepartum care of mother       The patient will follow up in 1-2 week(s). She was counseled to call and/or report to L&D for vaginal bleeding, regular contractions, leakage of fluid or  decreased fetal movement.    Guilherme Bailey D.O.

## 2023-10-11 ENCOUNTER — ANCILLARY PROCEDURE (OUTPATIENT)
Dept: MATERNAL FETAL MEDICINE | Facility: MEDICAL CENTER | Age: 23
End: 2023-10-11

## 2023-10-11 VITALS — SYSTOLIC BLOOD PRESSURE: 148 MMHG | WEIGHT: 125.9 LBS | BODY MASS INDEX: 23.03 KG/M2 | DIASTOLIC BLOOD PRESSURE: 95 MMHG

## 2023-10-11 DIAGNOSIS — Z3A.35 35 WEEKS GESTATION OF PREGNANCY: ICD-10-CM

## 2023-10-11 DIAGNOSIS — O36.5930 POOR FETAL GROWTH AFFECTING MANAGEMENT OF MOTHER IN THIRD TRIMESTER, SINGLE OR UNSPECIFIED FETUS: ICD-10-CM

## 2023-10-11 DIAGNOSIS — O36.5931 POOR FETAL GROWTH AFFECTING MANAGEMENT OF MOTHER IN THIRD TRIMESTER, FETUS 1 OF MULTIPLE GESTATION: ICD-10-CM

## 2023-10-11 DIAGNOSIS — O28.3 ABNORMAL FETAL ULTRASOUND: ICD-10-CM

## 2023-10-11 DIAGNOSIS — O35.BXX0 FETAL CARDIAC ANOMALY COMPLICATING PREGNANCY, ANTEPARTUM, NOT APPLICABLE OR UNSPECIFIED FETUS: ICD-10-CM

## 2023-10-11 PROCEDURE — 99203 OFFICE O/P NEW LOW 30 MIN: CPT | Performed by: OBSTETRICS & GYNECOLOGY

## 2023-10-11 PROCEDURE — 76811 OB US DETAILED SNGL FETUS: CPT | Performed by: OBSTETRICS & GYNECOLOGY

## 2023-10-11 PROCEDURE — 76820 UMBILICAL ARTERY ECHO: CPT | Performed by: OBSTETRICS & GYNECOLOGY

## 2023-10-11 ASSESSMENT — FIBROSIS 4 INDEX: FIB4 SCORE: 0.49

## 2023-10-18 ENCOUNTER — HOSPITAL ENCOUNTER (INPATIENT)
Facility: MEDICAL CENTER | Age: 23
LOS: 4 days | End: 2023-10-22
Attending: OBSTETRICS & GYNECOLOGY | Admitting: OBSTETRICS & GYNECOLOGY
Payer: COMMERCIAL

## 2023-10-18 ENCOUNTER — ROUTINE PRENATAL (OUTPATIENT)
Dept: OBGYN | Facility: CLINIC | Age: 23
End: 2023-10-18
Payer: COMMERCIAL

## 2023-10-18 ENCOUNTER — HOSPITAL ENCOUNTER (OUTPATIENT)
Facility: MEDICAL CENTER | Age: 23
End: 2023-10-18
Attending: NURSE PRACTITIONER
Payer: COMMERCIAL

## 2023-10-18 ENCOUNTER — ANESTHESIA EVENT (OUTPATIENT)
Dept: OBGYN | Facility: MEDICAL CENTER | Age: 23
End: 2023-10-18
Payer: COMMERCIAL

## 2023-10-18 ENCOUNTER — ANESTHESIA (OUTPATIENT)
Dept: OBGYN | Facility: MEDICAL CENTER | Age: 23
End: 2023-10-18
Payer: COMMERCIAL

## 2023-10-18 VITALS — WEIGHT: 127 LBS | DIASTOLIC BLOOD PRESSURE: 110 MMHG | BODY MASS INDEX: 23.23 KG/M2 | SYSTOLIC BLOOD PRESSURE: 150 MMHG

## 2023-10-18 DIAGNOSIS — Z98.891 HISTORY OF C-SECTION: ICD-10-CM

## 2023-10-18 LAB
ABO GROUP BLD: NORMAL
ALBUMIN SERPL BCP-MCNC: 3.4 G/DL (ref 3.2–4.9)
ALBUMIN SERPL BCP-MCNC: 3.5 G/DL (ref 3.2–4.9)
ALBUMIN SERPL BCP-MCNC: 3.6 G/DL (ref 3.2–4.9)
ALBUMIN/GLOB SERPL: 1.1 G/DL
ALBUMIN/GLOB SERPL: 1.2 G/DL
ALBUMIN/GLOB SERPL: 1.3 G/DL
ALP SERPL-CCNC: 173 U/L (ref 30–99)
ALP SERPL-CCNC: 175 U/L (ref 30–99)
ALP SERPL-CCNC: 189 U/L (ref 30–99)
ALT SERPL-CCNC: 11 U/L (ref 2–50)
ALT SERPL-CCNC: 11 U/L (ref 2–50)
ALT SERPL-CCNC: 12 U/L (ref 2–50)
ANION GAP SERPL CALC-SCNC: 13 MMOL/L (ref 7–16)
ANION GAP SERPL CALC-SCNC: 15 MMOL/L (ref 7–16)
ANION GAP SERPL CALC-SCNC: 15 MMOL/L (ref 7–16)
AST SERPL-CCNC: 15 U/L (ref 12–45)
AST SERPL-CCNC: 16 U/L (ref 12–45)
AST SERPL-CCNC: 22 U/L (ref 12–45)
BASOPHILS # BLD AUTO: 0.3 % (ref 0–1.8)
BASOPHILS # BLD: 0.02 K/UL (ref 0–0.12)
BILIRUB SERPL-MCNC: 0.2 MG/DL (ref 0.1–1.5)
BILIRUB SERPL-MCNC: 0.3 MG/DL (ref 0.1–1.5)
BILIRUB SERPL-MCNC: 0.4 MG/DL (ref 0.1–1.5)
BLD GP AB SCN SERPL QL: NORMAL
BUN SERPL-MCNC: 4 MG/DL (ref 8–22)
BUN SERPL-MCNC: 5 MG/DL (ref 8–22)
BUN SERPL-MCNC: 5 MG/DL (ref 8–22)
CALCIUM ALBUM COR SERPL-MCNC: 8.2 MG/DL (ref 8.5–10.5)
CALCIUM ALBUM COR SERPL-MCNC: 8.8 MG/DL (ref 8.5–10.5)
CALCIUM ALBUM COR SERPL-MCNC: 8.8 MG/DL (ref 8.5–10.5)
CALCIUM SERPL-MCNC: 7.9 MG/DL (ref 8.5–10.5)
CALCIUM SERPL-MCNC: 8.3 MG/DL (ref 8.5–10.5)
CALCIUM SERPL-MCNC: 8.4 MG/DL (ref 8.5–10.5)
CHLORIDE SERPL-SCNC: 100 MMOL/L (ref 96–112)
CHLORIDE SERPL-SCNC: 98 MMOL/L (ref 96–112)
CHLORIDE SERPL-SCNC: 99 MMOL/L (ref 96–112)
CO2 SERPL-SCNC: 25 MMOL/L (ref 20–33)
CO2 SERPL-SCNC: 25 MMOL/L (ref 20–33)
CO2 SERPL-SCNC: 26 MMOL/L (ref 20–33)
CREAT SERPL-MCNC: 0.57 MG/DL (ref 0.5–1.4)
CREAT SERPL-MCNC: 0.62 MG/DL (ref 0.5–1.4)
CREAT SERPL-MCNC: 0.74 MG/DL (ref 0.5–1.4)
CREAT UR-MCNC: 63.61 MG/DL
EOSINOPHIL # BLD AUTO: 0.02 K/UL (ref 0–0.51)
EOSINOPHIL NFR BLD: 0.3 % (ref 0–6.9)
ERYTHROCYTE [DISTWIDTH] IN BLOOD BY AUTOMATED COUNT: 40.8 FL (ref 35.9–50)
GFR SERPLBLD CREATININE-BSD FMLA CKD-EPI: 116 ML/MIN/1.73 M 2
GFR SERPLBLD CREATININE-BSD FMLA CKD-EPI: 128 ML/MIN/1.73 M 2
GFR SERPLBLD CREATININE-BSD FMLA CKD-EPI: 131 ML/MIN/1.73 M 2
GLOBULIN SER CALC-MCNC: 2.8 G/DL (ref 1.9–3.5)
GLOBULIN SER CALC-MCNC: 2.8 G/DL (ref 1.9–3.5)
GLOBULIN SER CALC-MCNC: 3.3 G/DL (ref 1.9–3.5)
GLUCOSE SERPL-MCNC: 132 MG/DL (ref 65–99)
GLUCOSE SERPL-MCNC: 157 MG/DL (ref 65–99)
GLUCOSE SERPL-MCNC: 92 MG/DL (ref 65–99)
HCT VFR BLD AUTO: 33.7 % (ref 37–47)
HGB BLD-MCNC: 11.2 G/DL (ref 12–16)
IMM GRANULOCYTES # BLD AUTO: 0.04 K/UL (ref 0–0.11)
IMM GRANULOCYTES NFR BLD AUTO: 0.7 % (ref 0–0.9)
LYMPHOCYTES # BLD AUTO: 1.51 K/UL (ref 1–4.8)
LYMPHOCYTES NFR BLD: 26.2 % (ref 22–41)
MAGNESIUM SERPL-MCNC: 3.1 MG/DL (ref 1.5–2.5)
MAGNESIUM SERPL-MCNC: 6.2 MG/DL (ref 1.5–2.5)
MCH RBC QN AUTO: 28.3 PG (ref 27–33)
MCHC RBC AUTO-ENTMCNC: 33.2 G/DL (ref 32.2–35.5)
MCV RBC AUTO: 85.1 FL (ref 81.4–97.8)
MONOCYTES # BLD AUTO: 0.46 K/UL (ref 0–0.85)
MONOCYTES NFR BLD AUTO: 8 % (ref 0–13.4)
NEUTROPHILS # BLD AUTO: 3.71 K/UL (ref 1.82–7.42)
NEUTROPHILS NFR BLD: 64.5 % (ref 44–72)
NRBC # BLD AUTO: 0 K/UL
NRBC BLD-RTO: 0 /100 WBC (ref 0–0.2)
PLATELET # BLD AUTO: 143 K/UL (ref 164–446)
PMV BLD AUTO: 12.1 FL (ref 9–12.9)
POTASSIUM SERPL-SCNC: 2.5 MMOL/L (ref 3.6–5.5)
POTASSIUM SERPL-SCNC: 2.5 MMOL/L (ref 3.6–5.5)
POTASSIUM SERPL-SCNC: 2.7 MMOL/L (ref 3.6–5.5)
PROT SERPL-MCNC: 6.2 G/DL (ref 6–8.2)
PROT SERPL-MCNC: 6.3 G/DL (ref 6–8.2)
PROT SERPL-MCNC: 6.9 G/DL (ref 6–8.2)
PROT UR-MCNC: 16 MG/DL (ref 0–15)
PROT/CREAT UR: 252 MG/G (ref 10–107)
RBC # BLD AUTO: 3.96 M/UL (ref 4.2–5.4)
RH BLD: NORMAL
SODIUM SERPL-SCNC: 138 MMOL/L (ref 135–145)
SODIUM SERPL-SCNC: 139 MMOL/L (ref 135–145)
SODIUM SERPL-SCNC: 139 MMOL/L (ref 135–145)
T PALLIDUM AB SER QL IA: NORMAL
WBC # BLD AUTO: 5.8 K/UL (ref 4.8–10.8)

## 2023-10-18 PROCEDURE — 700102 HCHG RX REV CODE 250 W/ 637 OVERRIDE(OP): Performed by: OBSTETRICS & GYNECOLOGY

## 2023-10-18 PROCEDURE — 700111 HCHG RX REV CODE 636 W/ 250 OVERRIDE (IP): Performed by: OBSTETRICS & GYNECOLOGY

## 2023-10-18 PROCEDURE — 160048 HCHG OR STATISTICAL LEVEL 1-5: Performed by: OBSTETRICS & GYNECOLOGY

## 2023-10-18 PROCEDURE — 700111 HCHG RX REV CODE 636 W/ 250 OVERRIDE (IP): Mod: JZ | Performed by: ANESTHESIOLOGY

## 2023-10-18 PROCEDURE — 160009 HCHG ANES TIME/MIN: Performed by: OBSTETRICS & GYNECOLOGY

## 2023-10-18 PROCEDURE — 87081 CULTURE SCREEN ONLY: CPT

## 2023-10-18 PROCEDURE — 700102 HCHG RX REV CODE 250 W/ 637 OVERRIDE(OP): Performed by: ANESTHESIOLOGY

## 2023-10-18 PROCEDURE — 85025 COMPLETE CBC W/AUTO DIFF WBC: CPT

## 2023-10-18 PROCEDURE — 700111 HCHG RX REV CODE 636 W/ 250 OVERRIDE (IP): Performed by: FAMILY MEDICINE

## 2023-10-18 PROCEDURE — 83735 ASSAY OF MAGNESIUM: CPT

## 2023-10-18 PROCEDURE — 59514 CESAREAN DELIVERY ONLY: CPT | Mod: 80 | Performed by: OBSTETRICS & GYNECOLOGY

## 2023-10-18 PROCEDURE — 59510 CESAREAN DELIVERY: CPT | Performed by: OBSTETRICS & GYNECOLOGY

## 2023-10-18 PROCEDURE — 86850 RBC ANTIBODY SCREEN: CPT

## 2023-10-18 PROCEDURE — 86900 BLOOD TYPING SEROLOGIC ABO: CPT

## 2023-10-18 PROCEDURE — 36415 COLL VENOUS BLD VENIPUNCTURE: CPT

## 2023-10-18 PROCEDURE — A9270 NON-COVERED ITEM OR SERVICE: HCPCS | Performed by: OBSTETRICS & GYNECOLOGY

## 2023-10-18 PROCEDURE — 82570 ASSAY OF URINE CREATININE: CPT

## 2023-10-18 PROCEDURE — 770002 HCHG ROOM/CARE - OB PRIVATE (112)

## 2023-10-18 PROCEDURE — C1755 CATHETER, INTRASPINAL: HCPCS | Performed by: OBSTETRICS & GYNECOLOGY

## 2023-10-18 PROCEDURE — 3080F DIAST BP >= 90 MM HG: CPT | Performed by: NURSE PRACTITIONER

## 2023-10-18 PROCEDURE — 86780 TREPONEMA PALLIDUM: CPT

## 2023-10-18 PROCEDURE — 80053 COMPREHEN METABOLIC PANEL: CPT | Mod: 91

## 2023-10-18 PROCEDURE — 160029 HCHG SURGERY MINUTES - 1ST 30 MINS LEVEL 4: Performed by: OBSTETRICS & GYNECOLOGY

## 2023-10-18 PROCEDURE — 0502F SUBSEQUENT PRENATAL CARE: CPT | Performed by: NURSE PRACTITIONER

## 2023-10-18 PROCEDURE — 160041 HCHG SURGERY MINUTES - EA ADDL 1 MIN LEVEL 4: Performed by: OBSTETRICS & GYNECOLOGY

## 2023-10-18 PROCEDURE — 3077F SYST BP >= 140 MM HG: CPT | Performed by: NURSE PRACTITIONER

## 2023-10-18 PROCEDURE — 87150 DNA/RNA AMPLIFIED PROBE: CPT

## 2023-10-18 PROCEDURE — A9270 NON-COVERED ITEM OR SERVICE: HCPCS | Performed by: ANESTHESIOLOGY

## 2023-10-18 PROCEDURE — 700105 HCHG RX REV CODE 258: Performed by: OBSTETRICS & GYNECOLOGY

## 2023-10-18 PROCEDURE — 700111 HCHG RX REV CODE 636 W/ 250 OVERRIDE (IP): Mod: JZ | Performed by: OBSTETRICS & GYNECOLOGY

## 2023-10-18 PROCEDURE — 700105 HCHG RX REV CODE 258: Performed by: ANESTHESIOLOGY

## 2023-10-18 PROCEDURE — 160002 HCHG RECOVERY MINUTES (STAT): Performed by: OBSTETRICS & GYNECOLOGY

## 2023-10-18 PROCEDURE — 700101 HCHG RX REV CODE 250: Performed by: ANESTHESIOLOGY

## 2023-10-18 PROCEDURE — 700111 HCHG RX REV CODE 636 W/ 250 OVERRIDE (IP): Performed by: ANESTHESIOLOGY

## 2023-10-18 PROCEDURE — 86901 BLOOD TYPING SEROLOGIC RH(D): CPT

## 2023-10-18 PROCEDURE — 84156 ASSAY OF PROTEIN URINE: CPT

## 2023-10-18 PROCEDURE — 160035 HCHG PACU - 1ST 60 MINS PHASE I: Performed by: OBSTETRICS & GYNECOLOGY

## 2023-10-18 RX ORDER — NIFEDIPINE 10 MG/1
10 CAPSULE ORAL ONCE
Status: COMPLETED | OUTPATIENT
Start: 2023-10-18 | End: 2023-10-18

## 2023-10-18 RX ORDER — LABETALOL 100 MG/1
200 TABLET, FILM COATED ORAL 3 TIMES DAILY
Status: DISCONTINUED | OUTPATIENT
Start: 2023-10-19 | End: 2023-10-19

## 2023-10-18 RX ORDER — ONDANSETRON 2 MG/ML
4 INJECTION INTRAMUSCULAR; INTRAVENOUS EVERY 4 HOURS PRN
Status: DISCONTINUED | OUTPATIENT
Start: 2023-10-18 | End: 2023-10-19

## 2023-10-18 RX ORDER — SODIUM CHLORIDE, SODIUM LACTATE, POTASSIUM CHLORIDE, CALCIUM CHLORIDE 600; 310; 30; 20 MG/100ML; MG/100ML; MG/100ML; MG/100ML
INJECTION, SOLUTION INTRAVENOUS CONTINUOUS
Status: DISCONTINUED | OUTPATIENT
Start: 2023-10-18 | End: 2023-10-19

## 2023-10-18 RX ORDER — MAGNESIUM SULFATE HEPTAHYDRATE 40 MG/ML
2 INJECTION, SOLUTION INTRAVENOUS CONTINUOUS
Status: DISCONTINUED | OUTPATIENT
Start: 2023-10-18 | End: 2023-10-19

## 2023-10-18 RX ORDER — TRANEXAMIC ACID 100 MG/ML
INJECTION, SOLUTION INTRAVENOUS PRN
Status: DISCONTINUED | OUTPATIENT
Start: 2023-10-18 | End: 2023-10-18 | Stop reason: SURG

## 2023-10-18 RX ORDER — SODIUM CHLORIDE, SODIUM LACTATE, POTASSIUM CHLORIDE, CALCIUM CHLORIDE 600; 310; 30; 20 MG/100ML; MG/100ML; MG/100ML; MG/100ML
INJECTION, SOLUTION INTRAVENOUS ONCE
Status: DISCONTINUED | OUTPATIENT
Start: 2023-10-18 | End: 2023-10-19

## 2023-10-18 RX ORDER — MORPHINE SULFATE 0.5 MG/ML
INJECTION, SOLUTION EPIDURAL; INTRATHECAL; INTRAVENOUS
Status: COMPLETED | OUTPATIENT
Start: 2023-10-18 | End: 2023-10-18

## 2023-10-18 RX ORDER — DIPHENHYDRAMINE HYDROCHLORIDE 50 MG/ML
12.5 INJECTION INTRAMUSCULAR; INTRAVENOUS
Status: DISCONTINUED | OUTPATIENT
Start: 2023-10-18 | End: 2023-10-18

## 2023-10-18 RX ORDER — CARBOPROST TROMETHAMINE 250 UG/ML
250 INJECTION, SOLUTION INTRAMUSCULAR
Status: DISCONTINUED | OUTPATIENT
Start: 2023-10-18 | End: 2023-10-22 | Stop reason: HOSPADM

## 2023-10-18 RX ORDER — ACETAMINOPHEN 500 MG
1000 TABLET ORAL EVERY 6 HOURS
Status: DISCONTINUED | OUTPATIENT
Start: 2023-10-18 | End: 2023-10-19

## 2023-10-18 RX ORDER — CEFAZOLIN SODIUM 1 G/3ML
2 INJECTION, POWDER, FOR SOLUTION INTRAMUSCULAR; INTRAVENOUS ONCE
Status: DISCONTINUED | OUTPATIENT
Start: 2023-10-18 | End: 2023-10-18 | Stop reason: HOSPADM

## 2023-10-18 RX ORDER — ONDANSETRON 2 MG/ML
4 INJECTION INTRAMUSCULAR; INTRAVENOUS EVERY 6 HOURS PRN
Status: DISCONTINUED | OUTPATIENT
Start: 2023-10-18 | End: 2023-10-18

## 2023-10-18 RX ORDER — OXYCODONE HYDROCHLORIDE 5 MG/1
5 TABLET ORAL EVERY 4 HOURS PRN
Status: ACTIVE | OUTPATIENT
Start: 2023-10-18 | End: 2023-10-19

## 2023-10-18 RX ORDER — SODIUM CHLORIDE, SODIUM GLUCONATE, SODIUM ACETATE, POTASSIUM CHLORIDE AND MAGNESIUM CHLORIDE 526; 502; 368; 37; 30 MG/100ML; MG/100ML; MG/100ML; MG/100ML; MG/100ML
INJECTION, SOLUTION INTRAVENOUS
Status: DISCONTINUED | OUTPATIENT
Start: 2023-10-18 | End: 2023-10-18 | Stop reason: SURG

## 2023-10-18 RX ORDER — SODIUM CHLORIDE, SODIUM GLUCONATE, SODIUM ACETATE, POTASSIUM CHLORIDE AND MAGNESIUM CHLORIDE 526; 502; 368; 37; 30 MG/100ML; MG/100ML; MG/100ML; MG/100ML; MG/100ML
1500 INJECTION, SOLUTION INTRAVENOUS ONCE
Status: DISCONTINUED | OUTPATIENT
Start: 2023-10-18 | End: 2023-10-18 | Stop reason: HOSPADM

## 2023-10-18 RX ORDER — POTASSIUM CHLORIDE 7.45 MG/ML
10 INJECTION INTRAVENOUS
Status: COMPLETED | OUTPATIENT
Start: 2023-10-18 | End: 2023-10-19

## 2023-10-18 RX ORDER — DIPHENHYDRAMINE HYDROCHLORIDE 50 MG/ML
12.5 INJECTION INTRAMUSCULAR; INTRAVENOUS EVERY 6 HOURS PRN
Status: ACTIVE | OUTPATIENT
Start: 2023-10-18 | End: 2023-10-19

## 2023-10-18 RX ORDER — CALCIUM GLUCONATE 94 MG/ML
1 INJECTION, SOLUTION INTRAVENOUS
Status: DISCONTINUED | OUTPATIENT
Start: 2023-10-18 | End: 2023-10-18

## 2023-10-18 RX ORDER — HYDROMORPHONE HYDROCHLORIDE 1 MG/ML
0.2 INJECTION, SOLUTION INTRAMUSCULAR; INTRAVENOUS; SUBCUTANEOUS
Status: ACTIVE | OUTPATIENT
Start: 2023-10-18 | End: 2023-10-19

## 2023-10-18 RX ORDER — DIPHENHYDRAMINE HYDROCHLORIDE 50 MG/ML
25 INJECTION INTRAMUSCULAR; INTRAVENOUS EVERY 6 HOURS PRN
Status: ACTIVE | OUTPATIENT
Start: 2023-10-18 | End: 2023-10-19

## 2023-10-18 RX ORDER — CITRIC ACID/SODIUM CITRATE 334-500MG
30 SOLUTION, ORAL ORAL ONCE
Status: COMPLETED | OUTPATIENT
Start: 2023-10-18 | End: 2023-10-18

## 2023-10-18 RX ORDER — BUPIVACAINE HYDROCHLORIDE 7.5 MG/ML
INJECTION, SOLUTION INTRASPINAL
Status: COMPLETED | OUTPATIENT
Start: 2023-10-18 | End: 2023-10-18

## 2023-10-18 RX ORDER — EPHEDRINE SULFATE 50 MG/ML
10 INJECTION, SOLUTION INTRAVENOUS
Status: ACTIVE | OUTPATIENT
Start: 2023-10-18 | End: 2023-10-19

## 2023-10-18 RX ORDER — KETOROLAC TROMETHAMINE 30 MG/ML
30 INJECTION, SOLUTION INTRAMUSCULAR; INTRAVENOUS EVERY 6 HOURS
Status: DISCONTINUED | OUTPATIENT
Start: 2023-10-18 | End: 2023-10-19

## 2023-10-18 RX ORDER — SODIUM CHLORIDE, SODIUM LACTATE, POTASSIUM CHLORIDE, CALCIUM CHLORIDE 600; 310; 30; 20 MG/100ML; MG/100ML; MG/100ML; MG/100ML
INJECTION, SOLUTION INTRAVENOUS CONTINUOUS
Status: DISCONTINUED | OUTPATIENT
Start: 2023-10-18 | End: 2023-10-18

## 2023-10-18 RX ORDER — HYDRALAZINE HYDROCHLORIDE 20 MG/ML
5-10 INJECTION INTRAMUSCULAR; INTRAVENOUS
Status: DISCONTINUED | OUTPATIENT
Start: 2023-10-18 | End: 2023-10-19 | Stop reason: HOSPADM

## 2023-10-18 RX ORDER — HYDROMORPHONE HYDROCHLORIDE 1 MG/ML
0.4 INJECTION, SOLUTION INTRAMUSCULAR; INTRAVENOUS; SUBCUTANEOUS
Status: ACTIVE | OUTPATIENT
Start: 2023-10-18 | End: 2023-10-19

## 2023-10-18 RX ORDER — OXYCODONE HYDROCHLORIDE 10 MG/1
10 TABLET ORAL EVERY 4 HOURS PRN
Status: ACTIVE | OUTPATIENT
Start: 2023-10-18 | End: 2023-10-19

## 2023-10-18 RX ORDER — MAGNESIUM SULFATE HEPTAHYDRATE 40 MG/ML
4 INJECTION, SOLUTION INTRAVENOUS ONCE
Status: COMPLETED | OUTPATIENT
Start: 2023-10-18 | End: 2023-10-18

## 2023-10-18 RX ORDER — ONDANSETRON 2 MG/ML
4 INJECTION INTRAMUSCULAR; INTRAVENOUS
Status: DISCONTINUED | OUTPATIENT
Start: 2023-10-18 | End: 2023-10-18

## 2023-10-18 RX ORDER — HALOPERIDOL 5 MG/ML
1 INJECTION INTRAMUSCULAR
Status: DISCONTINUED | OUTPATIENT
Start: 2023-10-18 | End: 2023-10-18

## 2023-10-18 RX ORDER — POTASSIUM CHLORIDE 7.45 MG/ML
10 INJECTION INTRAVENOUS ONCE
Status: COMPLETED | OUTPATIENT
Start: 2023-10-18 | End: 2023-10-18

## 2023-10-18 RX ORDER — KETOROLAC TROMETHAMINE 30 MG/ML
30 INJECTION, SOLUTION INTRAMUSCULAR; INTRAVENOUS ONCE
Status: COMPLETED | OUTPATIENT
Start: 2023-10-18 | End: 2023-10-18

## 2023-10-18 RX ORDER — CEFAZOLIN SODIUM 1 G/3ML
INJECTION, POWDER, FOR SOLUTION INTRAMUSCULAR; INTRAVENOUS PRN
Status: DISCONTINUED | OUTPATIENT
Start: 2023-10-18 | End: 2023-10-18 | Stop reason: SURG

## 2023-10-18 RX ORDER — MISOPROSTOL 200 UG/1
800 TABLET ORAL
Status: COMPLETED | OUTPATIENT
Start: 2023-10-18 | End: 2023-10-18

## 2023-10-18 RX ORDER — LABETALOL HYDROCHLORIDE 5 MG/ML
20-80 INJECTION, SOLUTION INTRAVENOUS
Status: DISCONTINUED | OUTPATIENT
Start: 2023-10-18 | End: 2023-10-19 | Stop reason: HOSPADM

## 2023-10-18 RX ORDER — POTASSIUM CHLORIDE 7.45 MG/ML
10 INJECTION INTRAVENOUS
Status: DISPENSED | OUTPATIENT
Start: 2023-10-18 | End: 2023-10-18

## 2023-10-18 RX ORDER — NIFEDIPINE 10 MG/1
10 CAPSULE ORAL
Status: DISCONTINUED | OUTPATIENT
Start: 2023-10-18 | End: 2023-10-18

## 2023-10-18 RX ORDER — OXYTOCIN 10 [USP'U]/ML
10 INJECTION, SOLUTION INTRAMUSCULAR; INTRAVENOUS
Status: DISCONTINUED | OUTPATIENT
Start: 2023-10-18 | End: 2023-10-22 | Stop reason: HOSPADM

## 2023-10-18 RX ORDER — METOCLOPRAMIDE HYDROCHLORIDE 5 MG/ML
10 INJECTION INTRAMUSCULAR; INTRAVENOUS ONCE
Status: COMPLETED | OUTPATIENT
Start: 2023-10-18 | End: 2023-10-18

## 2023-10-18 RX ADMIN — CEFAZOLIN 1 G: 1 INJECTION, POWDER, FOR SOLUTION INTRAMUSCULAR; INTRAVENOUS at 13:37

## 2023-10-18 RX ADMIN — MORPHINE SULFATE 150 MCG: 0.5 INJECTION, SOLUTION EPIDURAL; INTRATHECAL; INTRAVENOUS at 13:39

## 2023-10-18 RX ADMIN — MAGNESIUM SULFATE HEPTAHYDRATE 2 G/HR: 40 INJECTION, SOLUTION INTRAVENOUS at 15:12

## 2023-10-18 RX ADMIN — TRANEXAMIC ACID 1000 MG: 100 INJECTION, SOLUTION INTRAVENOUS at 14:13

## 2023-10-18 RX ADMIN — NIFEDIPINE 10 MG: 10 CAPSULE ORAL at 16:34

## 2023-10-18 RX ADMIN — HYDROCODONE BITARTRATE AND ACETAMINOPHEN 15 MG: 7.5; 325 SOLUTION ORAL at 17:18

## 2023-10-18 RX ADMIN — HYDRALAZINE HYDROCHLORIDE 10 MG: 20 INJECTION, SOLUTION INTRAMUSCULAR; INTRAVENOUS at 13:07

## 2023-10-18 RX ADMIN — HYDRALAZINE HYDROCHLORIDE 5 MG: 20 INJECTION, SOLUTION INTRAMUSCULAR; INTRAVENOUS at 16:15

## 2023-10-18 RX ADMIN — MISOPROSTOL 800 MCG: 200 TABLET ORAL at 16:43

## 2023-10-18 RX ADMIN — SODIUM CHLORIDE, POTASSIUM CHLORIDE, SODIUM LACTATE AND CALCIUM CHLORIDE: 600; 310; 30; 20 INJECTION, SOLUTION INTRAVENOUS at 12:27

## 2023-10-18 RX ADMIN — POTASSIUM CHLORIDE 10 MEQ: 7.46 INJECTION, SOLUTION INTRAVENOUS at 23:41

## 2023-10-18 RX ADMIN — SODIUM CHLORIDE, POTASSIUM CHLORIDE, SODIUM LACTATE AND CALCIUM CHLORIDE: 600; 310; 30; 20 INJECTION, SOLUTION INTRAVENOUS at 17:42

## 2023-10-18 RX ADMIN — HYDRALAZINE HYDROCHLORIDE 5 MG: 20 INJECTION, SOLUTION INTRAMUSCULAR; INTRAVENOUS at 12:18

## 2023-10-18 RX ADMIN — MAGNESIUM SULFATE HEPTAHYDRATE 4 G: 4 INJECTION, SOLUTION INTRAVENOUS at 12:29

## 2023-10-18 RX ADMIN — OXYTOCIN 1000 ML: 10 INJECTION, SOLUTION INTRAMUSCULAR; INTRAVENOUS at 14:03

## 2023-10-18 RX ADMIN — POTASSIUM CHLORIDE 10 MEQ: 7.46 INJECTION, SOLUTION INTRAVENOUS at 20:17

## 2023-10-18 RX ADMIN — ONDANSETRON 4 MG: 2 INJECTION INTRAMUSCULAR; INTRAVENOUS at 12:32

## 2023-10-18 RX ADMIN — LABETALOL HYDROCHLORIDE 20 MG: 5 INJECTION, SOLUTION INTRAVENOUS at 23:37

## 2023-10-18 RX ADMIN — OXYTOCIN 500 ML: 10 INJECTION, SOLUTION INTRAMUSCULAR; INTRAVENOUS at 14:34

## 2023-10-18 RX ADMIN — POTASSIUM CHLORIDE 10 MEQ: 7.46 INJECTION, SOLUTION INTRAVENOUS at 22:32

## 2023-10-18 RX ADMIN — SODIUM CHLORIDE, SODIUM GLUCONATE, SODIUM ACETATE, POTASSIUM CHLORIDE AND MAGNESIUM CHLORIDE: 526; 502; 368; 37; 30 INJECTION, SOLUTION INTRAVENOUS at 13:37

## 2023-10-18 RX ADMIN — SODIUM CITRATE AND CITRIC ACID MONOHYDRATE 30 ML: 500; 334 SOLUTION ORAL at 13:23

## 2023-10-18 RX ADMIN — FAMOTIDINE 20 MG: 10 INJECTION, SOLUTION INTRAVENOUS at 13:24

## 2023-10-18 RX ADMIN — BUPIVACAINE HYDROCHLORIDE IN DEXTROSE 1.7 ML: 7.5 INJECTION, SOLUTION SUBARACHNOID at 13:39

## 2023-10-18 RX ADMIN — ACETAMINOPHEN 1000 MG: 500 TABLET, FILM COATED ORAL at 20:52

## 2023-10-18 RX ADMIN — POTASSIUM CHLORIDE 10 MEQ: 7.46 INJECTION, SOLUTION INTRAVENOUS at 15:30

## 2023-10-18 RX ADMIN — NIFEDIPINE 10 MG: 10 CAPSULE ORAL at 11:49

## 2023-10-18 RX ADMIN — ONDANSETRON 4 MG: 2 INJECTION INTRAMUSCULAR; INTRAVENOUS at 12:39

## 2023-10-18 RX ADMIN — POTASSIUM CHLORIDE 10 MEQ: 7.46 INJECTION, SOLUTION INTRAVENOUS at 21:24

## 2023-10-18 RX ADMIN — MAGNESIUM SULFATE HEPTAHYDRATE 2 G/HR: 40 INJECTION, SOLUTION INTRAVENOUS at 12:51

## 2023-10-18 RX ADMIN — METOCLOPRAMIDE 10 MG: 5 INJECTION, SOLUTION INTRAMUSCULAR; INTRAVENOUS at 13:24

## 2023-10-18 RX ADMIN — KETOROLAC TROMETHAMINE 30 MG: 30 INJECTION, SOLUTION INTRAMUSCULAR; INTRAVENOUS at 15:43

## 2023-10-18 ASSESSMENT — FIBROSIS 4 INDEX
FIB4 SCORE: 0.49
FIB4 SCORE: 0.49

## 2023-10-18 ASSESSMENT — PATIENT HEALTH QUESTIONNAIRE - PHQ9
2. FEELING DOWN, DEPRESSED, IRRITABLE, OR HOPELESS: NOT AT ALL
1. LITTLE INTEREST OR PLEASURE IN DOING THINGS: NOT AT ALL
SUM OF ALL RESPONSES TO PHQ9 QUESTIONS 1 AND 2: 0

## 2023-10-18 ASSESSMENT — LIFESTYLE VARIABLES: EVER_SMOKED: NEVER

## 2023-10-18 ASSESSMENT — PAIN SCALES - GENERAL: PAINLEVEL: 0 - NO PAIN

## 2023-10-18 NOTE — PROGRESS NOTES
Pt. Here for OB/FU.   Reports Good FM.   Pt. Denies VB, LOF, or UC's.   Chaperone offered and indicated.   GBS today.   Flu declined.   Pt states she is having some discharge and cramping.

## 2023-10-18 NOTE — ANESTHESIA PROCEDURE NOTES
Spinal Block    Date/Time: 10/18/2023 1:39 PM    Performed by: Jitendra Uribe M.D.  Authorized by: Jitendra Uribe M.D.    Start Time:  10/18/2023 1:39 PM  Reason for Block: primary anesthetic    patient identified, IV checked, site marked, risks and benefits discussed, surgical consent, monitors and equipment checked, pre-op evaluation and timeout performed    Patient Position:  Sitting  Prep: ChloraPrep, patient draped and sterile technique    Monitoring:  Blood pressure, continuous pulse oximetry and heart rate  Approach:  Midline  Location:  L3-4  Injection Technique:  Single-shot  Skin infiltration:  Lidocaine  Strength:  1%  Dose:  3ml  Needle Type:  Pencan  Needle Gauge:  25 G  CSF flowing pre/post injection:  Yes  Sensory Level:  T6

## 2023-10-18 NOTE — PROGRESS NOTES
1130 Report received from Vivi HIGGINS.     1134 Kaitlyn MAURICIO updated on pt status. Orders received to start hypertensive protocol, start Magnesium 4g bolus and begin with pre-op for urgent repeat c/s. See MAR for all interventions.    1336 Pt out of LDA1 and into OR2.    1402 Delivery of viable female. APGARs 8/9. EBL 300mL.    1441 Pt out of OR2 and into PACU1 in stable condition.    1924 Kaitlyn MAURICIO updated on pt status. Order received to keep pt on antepartum versus PP d/t hypokalemia.     1942 Pt out of PACU and into S231.    2000 Report given to Mari HIGGINS.

## 2023-10-18 NOTE — ANESTHESIA TIME REPORT
Anesthesia Start and Stop Event Times     Date Time Event    10/18/2023 1314 Ready for Procedure     1336 Anesthesia Start     1442 Anesthesia Stop        Responsible Staff  10/18/23    Name Role Begin End    Jitendra Uribe M.D. Anesth 1336 1442        Overtime Reason:  no overtime (within assigned shift)    Comments:

## 2023-10-18 NOTE — PROGRESS NOTES
SUBJECTIVE:  Pt is a 23 y.o.   at 36w6d  gestation. Presents today for follow-up prenatal care. Reports good  fetal movement. Denies regular cramping/contractions, bleeding or leaking of fluid. Denies dysuria, headaches, N/V. Generally feels well today except some menstrual cramping sometimes. Denies any headaches, sometimes sees spots in her vision but she drinks water and it goes away, denies right upper quadrant pain, swelling, SOB.      OBJECTIVE:  - See prenatal vitals flow  -   Vitals:    10/18/23 1025   BP: (!) 160/116   Weight: 127 lb                 ASSESSMENT:   - IUP at 36w6d    - S<D   -   Patient Active Problem List    Diagnosis Date Noted    History of pre-eclampsia w/ severe features 2023    Echogenic focus of heart of fetus affecting antepartum care of mother 2023    History of  2020         PLAN:  - S/sx pregnancy and labor warning signs vs general discomforts discussed  - Fetal movements and/or kick counts reviewed   - Adequate hydration reinforced  - Nutrition/exercise/vitamin education; continue PNV  - Repeat c/s at 39 weeks if not sooner based on assessment in LnD  - Will try to do GCT test with food replacement rather than drink she threw up  - Growth US ordered if no delivery today  - To LnD now, Dr. Guerra advised   - Anticipatory guidance given  - RTC in 1 weeks for follow-up prenatal care if no del asap

## 2023-10-18 NOTE — H&P
History and Physical      Mare Eason is a 23 y.o. female  at 36w6d who presents for elevated blood pressures in the office. Denies RUQ pain, visual changes. Admits to HA and nausea.     Subjective:   negative  For CTXS.   negative Feels pain   negative for LOF  negative for vaginal bleeding.   positive for fetal movement    ROS: Pertinent items are noted in HPI.    No past medical history on file.  Past Surgical History:   Procedure Laterality Date    NE  DELIVERY ONLY N/A 2020    Procedure:  SECTION, PRIMARY;  Surgeon: Gianna Temple M.D.;  Location: LABOR AND DELIVERY;  Service: Obstetrics     OB History    Para Term  AB Living   3 1   1 1 1   SAB IAB Ectopic Molar Multiple Live Births   1       0 1      # Outcome Date GA Lbr Adán/2nd Weight Sex Delivery Anes PTL Lv   3 Current            2 SAB 2023 6w0d             Birth Comments: Passed on its own.   1  20 36w1d  2.145 kg (4 lb 11.7 oz) F CS-LTranv EPI, Spinal Y GUILHERME     Social History     Socioeconomic History    Marital status: Single     Spouse name: Not on file    Number of children: Not on file    Years of education: Not on file    Highest education level: Not on file   Occupational History    Not on file   Tobacco Use    Smoking status: Never    Smokeless tobacco: Never   Vaping Use    Vaping Use: Never used   Substance and Sexual Activity    Alcohol use: Not Currently     Comment: occ    Drug use: Yes     Types: Marijuana, Inhaled     Comment: Marijuana     Sexual activity: Not Currently     Partners: Male     Birth control/protection: None   Other Topics Concern    Not on file   Social History Narrative    Not on file     Social Determinants of Health     Financial Resource Strain: Not on file   Food Insecurity: Not on file   Transportation Needs: Not on file   Physical Activity: Not on file   Stress: Not on file   Social Connections: Not on file   Intimate Partner Violence:  "Not on file   Housing Stability: Not on file     Allergies: Patient has no known allergies.    Current Facility-Administered Medications:     hydrALAZINE (Apresoline) injection 5-10 mg, 5-10 mg, Intravenous, Q20MIN PRN **OR** labetalol (Normodyne/Trandate) injection 20-80 mg, 20-80 mg, Intravenous, Q10 MIN PRN **OR** NIFEdipine IR (Procardia) capsule 10 mg, 10 mg, Oral, Q20MIN PRN, WILLIAN Conner.O.    lactated ringers infusion, , Intravenous, Continuous, WILLIAN Conner.O.    calcium GLUConate injection 1 g, 1 g, Intravenous, Once PRN, WILLIAN Conner.O.    magnesium sulfate IVPB premix 4 g, 4 g, Intravenous, Once **FOLLOWED BY** magnesium sulfate 40 g/1000mL infusion, 2 g/hr, Intravenous, Continuous, WILLIAN Conner.O.    lactated ringers (LR) infusion, , Intravenous, Continuous, Shaila Sahni D.O.    ceFAZolin (Ancef) injection 2 g, 2 g, Intravenous, Once, Shaila Sahni D.O.    oxytocin (Pitocin) infusion bolus (for post delivery), 20 Units, Intravenous, Once **FOLLOWED BY** oxytocin (Pitocin) infusion (for post delivery), 125 mL/hr, Intravenous, Continuous, Shaila Sahni D.O.    Prenatal care with [facility] starting at 12 weeks with following problems:  Patient Active Problem List    Diagnosis Date Noted    Indication for care in labor or delivery 10/18/2023    History of pre-eclampsia w/ severe features 2023    Echogenic focus of heart of fetus affecting antepartum care of mother 2023    History of  2020               Objective:      BP (!) 176/118   Pulse 74   Temp (!) 35.8 °C (96.4 °F) (Temporal)   Resp 18   Ht 1.575 m (5' 2\")   Wt 57.6 kg (127 lb)   SpO2 100%     General:   no acute distress, alert, cooperative   Skin:   normal   HEENT:  PERRLA and EOMI   Lungs:   CTA bilateral   Heart:   regular rate and rhythm   Abdomen:   gravid, NT   EFW:  2400 gms   Pelvis:  adequate with gynecoid pelvis   FHT:  120 BPM   Uterine Size: S=D   Presentations: Cephalic "   Cervix:                               Lab Review  Lab:   Blood type: O     Recent Results (from the past 5880 hour(s))   COMP METABOLIC PANEL    Collection Time: 03/20/23  9:49 PM   Result Value Ref Range    Sodium 138 136 - 145 mmol/L    Potassium 3.9 3.5 - 5.3 mmol/L    Chloride 108 98 - 110 mmol/L    Co2 23.0 20.0 - 31.0 mmol/L    Bun 11.0 6.0 - 24.0 mg/dL    Creatinine 0.9 0.5 - 1.0 mg/dL    Bun-Creatinine Ratio 12.2 10.0 - 20.0    Glucose, Bld 93 70 - 140 mg/dL    Calcium 8.9 8.6 - 10.4 mg/dL    AST(SGOT) 25 10 - 36 U/L    ALT(SGPT) 16 6 - 29 U/L    Alkaline Phosphatase 51 33 - 115 U/L    Total Protein 6.8 6.4 - 8.4 g/dL    Albumin (Mass/volume) in Synovial fluid External 4.1 3.6 - 5.1 g/dL    Total Bilirubin 0.4 0.2 - 1.2 mg/dL    Ag Ratio 1.5 0.8 - 2.0    Anion Gap 7.0 6 - 19 mmol/L    eGFR >90.0 >90.0 mL/min/1.73m*2   CBC WITH DIFFERENTIAL    Collection Time: 03/20/23  9:49 PM   Result Value Ref Range    WBC 7.00 3.40 - 10.00 10*3/uL    RBC 4.23 3.90 - 5.03 10*6/uL    Hemoglobin 11.9 (L) 12.0 - 15.5 g/dL    Hematocrit 36.1 34.9 - 44.5 %    MCV 85.5 81.6 - 98.3 fL    MCH 28.3 27.5 - 33.2 pg    MCHC 33.1 (L) 33.4 - 35.5 g/dL    RDW 14.4 11.8 - 15.6 %    Platelet Count 159 150 - 450 10*3/uL    MPV 9.7 7.4 - 11.0 fL    Neutrophils-Polys 69.9 %    Lymphocytes 21.9 %    Monocytes 7.3 %    Eosinophils 0.5 %    Basophils 0.4 %    Neutrophils (Absolute) 4.9 1.7 - 7.0 10*3/uL    Lymphs (Absolute) 1.5 0.9 - 2.9 10*3/uL    Monos (Absolute) 0.5 0.3 - 0.9 10*3/uL    Eos (Absolute) 0.0 0.0 - 0.5 10*3/uL    Baso (Absolute) 0.0 0.0 - 0.2 10*3/uL   BASIC METABOLIC PANEL    Collection Time: 03/29/23  3:02 PM   Result Value Ref Range    Sodium 135 (L) 136 - 145 mmol/L    Potassium 4.0 3.5 - 5.3 mmol/L    Chloride 103 98 - 110 mmol/L    Co2 22.0 20.0 - 31.0 mmol/L    Bun 7.0 6.0 - 24.0 mg/dL    Creatinine 0.9 0.5 - 1.0 mg/dL    Glucose, Bld 112 70 - 140 mg/dL    Calcium 10.0 8.6 - 10.4 mg/dL    eGFR >90.0 >90.0 mL/min/1.73m*2     Anion Gap 10.0 6 - 19 mmol/L    Bun-Creatinine Ratio 7.8 (L) 10.0 - 20.0   CBC WITH DIFFERENTIAL    Collection Time: 03/29/23  3:02 PM   Result Value Ref Range    WBC 6.60 3.40 - 10.00 10*3/uL    RBC 4.67 3.90 - 5.03 10*6/uL    Hemoglobin 13.3 12.0 - 15.5 g/dL    Hematocrit 40.3 34.9 - 44.5 %    MCV 86.3 81.6 - 98.3 fL    MCH 28.4 27.5 - 33.2 pg    MCHC 32.9 (L) 33.4 - 35.5 g/dL    RDW 14.2 11.8 - 15.6 %    Platelet Count 182 150 - 450 10*3/uL    MPV 9.0 7.4 - 11.0 fL    Neutrophils-Polys 66.7 %    Lymphocytes 25.7 %    Monocytes 6.6 %    Eosinophils 0.6 %    Basophils 0.4 %    Neutrophils (Absolute) 4.4 1.7 - 7.0 10*3/uL    Lymphs (Absolute) 1.7 0.9 - 2.9 10*3/uL    Monos (Absolute) 0.4 0.3 - 0.9 10*3/uL    Eos (Absolute) 0.0 0.0 - 0.5 10*3/uL    Baso (Absolute) 0.0 0.0 - 0.2 10*3/uL   POCT Pregnancy    Collection Time: 04/07/23  9:55 AM   Result Value Ref Range    POC Urine Pregnancy Test Positive     Internal Control Positive Positive     Internal Control Negative Negative    COMP METABOLIC PANEL    Collection Time: 04/18/23  2:28 PM   Result Value Ref Range    Sodium 136 136 - 145 mmol/L    Potassium 3.5 3.5 - 5.3 mmol/L    Chloride 104 98 - 110 mmol/L    Co2 23.0 20.0 - 31.0 mmol/L    Bun 7.0 6.0 - 24.0 mg/dL    Creatinine 0.9 0.5 - 1.0 mg/dL    Bun-Creatinine Ratio 7.8 (L) 10.0 - 20.0    Glucose, Bld 121 70 - 140 mg/dL    Calcium 10.0 8.6 - 10.4 mg/dL    AST(SGOT) 20 10 - 36 U/L    ALT(SGPT) 13 6 - 29 U/L    Alkaline Phosphatase 53 33 - 115 U/L    Total Protein 8.1 6.4 - 8.4 g/dL    Albumin (Mass/volume) in Synovial fluid External 5.0 3.6 - 5.1 g/dL    Total Bilirubin 0.6 0.2 - 1.2 mg/dL    Ag Ratio 1.6 0.8 - 2.0    Anion Gap 9.0 6 - 19 mmol/L    eGFR >90.0 >90.0 mL/min/1.73m*2   CBC WITH DIFFERENTIAL    Collection Time: 04/18/23  2:28 PM   Result Value Ref Range    WBC 10.20 (H) 3.40 - 10.00 10*3/uL    RBC 4.46 3.90 - 5.03 10*6/uL    Hemoglobin 12.8 12.0 - 15.5 g/dL    Hematocrit 38.7 34.9 - 44.5 %    MCV  86.8 81.6 - 98.3 fL    MCH 28.7 27.5 - 33.2 pg    MCHC 33.1 (L) 33.4 - 35.5 g/dL    RDW 13.8 11.8 - 15.6 %    Platelet Count 198 150 - 450 10*3/uL    MPV 9.3 7.4 - 11.0 fL    Neutrophils-Polys 85.6 %    Lymphocytes 10.4 %    Monocytes 3.8 %    Eosinophils 0.0 %    Basophils 0.2 %    Neutrophils (Absolute) 8.8 (H) 1.7 - 7.0 10*3/uL    Lymphs (Absolute) 1.1 0.9 - 2.9 10*3/uL    Monos (Absolute) 0.4 0.3 - 0.9 10*3/uL    Eos (Absolute) 0.0 0.0 - 0.5 10*3/uL    Baso (Absolute) 0.0 0.0 - 0.2 10*3/uL   COMP METABOLIC PANEL    Collection Time: 05/28/23  7:37 AM   Result Value Ref Range    Sodium 138 136 - 145 mmol/L    Potassium 3.2 (L) 3.5 - 5.3 mmol/L    Chloride 105 98 - 110 mmol/L    Co2 23.0 20.0 - 31.0 mmol/L    Bun 8.0 6.0 - 24.0 mg/dL    Creatinine 0.8 0.5 - 1.0 mg/dL    Bun-Creatinine Ratio 10.0 10.0 - 20.0    Glucose, Bld 103 70 - 140 mg/dL    Calcium 9.4 8.6 - 10.4 mg/dL    AST(SGOT) 15 10 - 36 U/L    ALT(SGPT) 10 6 - 29 U/L    Alkaline Phosphatase 45 33 - 115 U/L    Total Protein 6.9 6.4 - 8.4 g/dL    Albumin (Mass/volume) in Synovial fluid External 4.2 3.6 - 5.1 g/dL    Total Bilirubin 0.4 0.2 - 1.2 mg/dL    Ag Ratio 1.6 0.8 - 2.0    Anion Gap 10.0 6 - 19 mmol/L    eGFR >90.0 >90.0 mL/min/1.73m*2   CBC WITH DIFFERENTIAL    Collection Time: 05/28/23  7:37 AM   Result Value Ref Range    WBC 8.70 3.40 - 10.00 10*3/uL    RBC 4.46 3.90 - 5.03 10*6/uL    Hemoglobin 12.9 12.0 - 15.5 g/dL    Hematocrit 38.6 34.9 - 44.5 %    MCV 86.4 81.6 - 98.3 fL    MCH 28.9 27.5 - 33.2 pg    MCHC 33.5 33.4 - 35.5 g/dL    RDW 13.2 11.8 - 15.6 %    Platelet Count 169 150 - 450 10*3/uL    MPV 9.4 7.4 - 11.0 fL    Neutrophils-Polys 72.9 %    Lymphocytes 20.2 %    Monocytes 6.0 %    Eosinophils 0.5 %    Basophils 0.4 %    Neutrophils (Absolute) 6.3 1.7 - 7.0 10*3/uL    Lymphs (Absolute) 1.8 0.9 - 2.9 10*3/uL    Monos (Absolute) 0.5 0.3 - 0.9 10*3/uL    Eos (Absolute) 0.0 0.0 - 0.5 10*3/uL    Baso (Absolute) 0.0 0.0 - 0.2 10*3/uL   PREG  CNTR PRENATAL PN    Collection Time: 07/05/23  1:02 PM   Result Value Ref Range    WBC 6.8 4.8 - 10.8 K/uL    RBC 4.49 4.20 - 5.40 M/uL    Hemoglobin 13.1 12.0 - 16.0 g/dL    Hematocrit 40.1 37.0 - 47.0 %    MCV 89.3 81.4 - 97.8 fL    MCH 29.2 27.0 - 33.0 pg    MCHC 32.7 32.2 - 35.5 g/dL    RDW 44.4 35.9 - 50.0 fL    Platelet Count 175 164 - 446 K/uL    MPV 12.6 9.0 - 12.9 fL    Hepatitis C Antibody Non-Reactive Non-Reactive    Hepatitis B Surface Antigen Non-Reactive Non-Reactive    Rubella IgG Antibody 48.10 IU/mL    Syphilis, Treponemal Qual Non-Reactive Non-Reactive   URINE CULTURE(NEW)    Collection Time: 07/05/23  1:02 PM    Specimen: Urine   Result Value Ref Range    Significant Indicator NEG     Source UR     Site Clean Catch     Culture Result Usual urogenital kami ,000 cfu/mL    HIV AG/AB COMBO ASSAY SCREENING    Collection Time: 07/05/23  1:02 PM   Result Value Ref Range    HIV Ag/Ab Combo Assay Non-Reactive Non Reactive   OP Prenatal Panel-Blood Bank    Collection Time: 07/05/23  1:02 PM   Result Value Ref Range    ABO Grouping Only O     Rh Grouping Only POS     Antibody Screen Scrn NEG    Comp Metabolic Panel    Collection Time: 07/05/23  1:02 PM   Result Value Ref Range    Sodium 135 135 - 145 mmol/L    Potassium 3.7 3.6 - 5.5 mmol/L    Chloride 98 96 - 112 mmol/L    Co2 25 20 - 33 mmol/L    Anion Gap 12.0 7.0 - 16.0    Glucose 84 65 - 99 mg/dL    Bun 5 (L) 8 - 22 mg/dL    Creatinine 0.61 0.50 - 1.40 mg/dL    Calcium 8.9 8.5 - 10.5 mg/dL    AST(SGOT) 14 12 - 45 U/L    ALT(SGPT) 14 2 - 50 U/L    Alkaline Phosphatase 50 30 - 99 U/L    Total Bilirubin 0.3 0.1 - 1.5 mg/dL    Albumin 3.8 3.2 - 4.9 g/dL    Total Protein 6.8 6.0 - 8.2 g/dL    Globulin 3.0 1.9 - 3.5 g/dL    A-G Ratio 1.3 g/dL   CORRECTED CALCIUM    Collection Time: 07/05/23  1:02 PM   Result Value Ref Range    Correct Calcium 9.1 8.5 - 10.5 mg/dL   ESTIMATED GFR    Collection Time: 07/05/23  1:02 PM   Result Value Ref Range    GFR  (CKD-EPI) 129 >60 mL/min/1.73 m 2   PROTEIN/CREAT RATIO URINE    Collection Time: 23  1:04 PM   Result Value Ref Range    Total Protein, Urine 12.0 0.0 - 15.0 mg/dL    Creatinine, Random Urine 107.03 mg/dL    Protein Creatinine Ratio 112 (H) 10 - 107 mg/g   URINE DRUG SCREEN W/CONF (AR)    Collection Time: 23  1:04 PM   Result Value Ref Range    Urine Amphetamine-Methamphetam Negative Cutoff 300 ng/mL    Barbiturates Negative Cutoff 200 ng/mL    Benzodiazepines Negative Cutoff 200 ng/mL    Propoxyphene Negative Cutoff 300 ng/mL    Cocaine Metabolite Negative Cutoff 150 ng/mL    Methadone Negative Cutoff 150 ng/mL    Codeine-Morphine Negative Cutoff 300 ng/mL    Phencyclidine -Pcp Negative Cutoff 25 ng/mL    Cannabinoid Metab Positive Cutoff 50 ng/mL    Drug Comment Urine Drugs See Note    CANNABINOIDS, UR CONFIRM    Collection Time: 23  1:04 PM   Result Value Ref Range    Cannabinoids, Ur Drug Confirmation >500 ng/mL        Assessment:   Mare Eason at 36w6d  Labor status: Not in labor.  Obstetrical history significant for   Patient Active Problem List    Diagnosis Date Noted    Indication for care in labor or delivery 10/18/2023    History of pre-eclampsia w/ severe features 2023    Echogenic focus of heart of fetus affecting antepartum care of mother 2023    History of  2020   .      Plan:     Admit to L&D  GBS unknown  Severe range blood pressures (168-194/ 109-124) and complaints of HA. Has a history of a previous  section at 36 weeks for pre-eclampsia. Patient desires repeat  section. Discussed with the patient the benefits, risks and alternative of  delivery. Risks include but are not limited to pain, infection, bleeding with possible need for transfusion, scarring, damage to surrounding structures.  Specifically organs that can be damaged are bowel, bladder, ureters, and nerves. I also discussed the risk of emergency blood  transfusion, rare risk of emergency hysterectomy or death.  Patient had the opportunity to ask questions. All questions were answered to the patient's satisfaction.  She reported understanding and signed consent.        SMCM            SMCM

## 2023-10-18 NOTE — ANESTHESIA PREPROCEDURE EVALUATION
Case: [id number] Date/Time: 10/18/23 1400    Procedure:  SECTION, REPEAT (Abdomen)    Anesthesia type: Spinal    Pre-op diagnosis: hx previous c section; preeclampsia    Location: [facility] OR 02 / SURGERY LABOR AND DELIVERY    Surgeons: Shaila Sahni D.O.          Relevant Problems   Other   (positive) History of    (positive) History of pre-eclampsia w/ severe features       Physical Exam    Airway   Mallampati: II  TM distance: >3 FB  Neck ROM: full       Cardiovascular - normal exam  Rhythm: regular  Rate: normal  (-) murmur     Dental - normal exam           Pulmonary - normal exam  Breath sounds clear to auscultation     Abdominal    Neurological - normal exam                 Anesthesia Plan    ASA 2- EMERGENT (pre-eclampsia)       Plan - spinal   Neuraxial block will be primary anesthetic                  Pertinent diagnostic labs and testing reviewed    Informed Consent:    Anesthetic plan and risks discussed with patient.

## 2023-10-18 NOTE — OP REPORT
PreOp Diagnosis: 1. IUP at 36w6d 2. Pre-eclampsia with severe features 3. Hx of previous  section       PostOp Diagnosis: same      Procedure(s):   SECTION, REPEAT    Surgeon(s):  Shaila Sahni D.O.    Assistant: Dr. Cornelia Guerra MD    Anesthesiologist/Type of Anesthesia:  Anesthesiologist: Jitendra Uribe M.D./Spinal      Specimens removed if any:  Cord gasses    Estimated Blood Loss: 300 cc    Findings: normal appearing uterus, ovaries, and fallopian tubes. Viable female  delivered in the vertex presentation at 1402 with Apgars 8 and 9 and weight 2360 grams.     Complications: none        Condition: Mother and baby tolerated procedure well and was taken to the recovery room awake and in stable condition.    Procedure:     The patient was taken to the operating room where spinal anesthesia was found to be adequate.  She was then prepped and draped in the normal sterile fashion in the dorsal supine position with a leftward tilt.  A Pfannenstiel skin incision was then made with a scalpel and carried through to the underlying fascia.  The fascia was incised in the midline and the incision extended laterally with Chaves scissors.  The superior aspect of the fascial incision was then grasped with [place] clamps, elevated, and the underlying rectus muscles dissected off bluntly.  Attention was then turned to the inferior aspect of the fascia which in a similar fashion was grasped, tented up with Julian clamps, and the rectus muscles dissected off bluntly.  Rectus muscles were then  in the midline and the peritoneum identified tented up and entered sharply with the Metzenbaum scissors.  The peritoneal incision was then extended superiorly and inferiorly with good visualization of the bladder.The bladder blade was placed. The bladder reflection was then grasped with pickups and entered sharply with Metzenbaum scissors.  The incision was then extended and the bladder flap created digitally.   The lower uterine segment incised in a transverse fashion with the scalpel.  The uterine incision was then extended cephalad and caudad bluntly. Bladder blade removed. The infant was then delivered atraumatically.  The nose and mouth were suctioned with bulb suction the cord doubly clamped and cut.  The infant was handed off to the waiting pediatricians.  The placenta was then removed with gentle traction and fundal massage.  The uterus was then cleared of all clots and debris.  Bladder blade re-insterted. The uterine incision was repaired with 0 Monocryl in a running locking fashion. The lower uterine segment was incredibly thin and a defect was noted after full hysterotomy closure so the defect was repaired with 0 monocryl in a vertical locking fashion. Bladder was noted to be clear from the area of the defect. Hysterotomy was found to be hemostatic at this time.  The gutters were cleared of all clots.  The uterus was then returned to the abdomen. The fascia was reapproximated with 0 Vicryl in a running fashion.  The subcuticular space was closed using a 3-0 Monocryl.  The skin was then closed using a 4-0 Monocryl.  The patient tolerated the procedure well.  Sponge lap and needle counts were correct x2.  2 g of Ancef were given prior to the procedure.  The patient was taken to the recovery room awake and in stable condition.        [facility]

## 2023-10-18 NOTE — OR SURGEON
Immediate Post OP Note    PreOp Diagnosis: 1. IUP at 36w6d 2. Pre-eclampsia with severe features 3. Hx of previous  section       PostOp Diagnosis: same      Procedure(s):   SECTION, REPEAT    Surgeon(s):  Shaila Sahni D.O.    Assistant: Dr. Cornelia Guerra MD    Anesthesiologist/Type of Anesthesia:  Anesthesiologist: Jitendra Uribe M.D./Spinal      Specimens removed if any:  Cord gasses    Estimated Blood Loss: 300 cc    Findings: normal appearing uterus, ovaries, and fallopian tubes. Viable female  delivered in the vertex presentation at 1402 with Apgars 8 and 9 and weight 2360 grams.     Complications: none        10/18/2023 2:41 PM Shaila Sahni D.O.

## 2023-10-19 ENCOUNTER — TELEPHONE (OUTPATIENT)
Dept: OBGYN | Facility: CLINIC | Age: 23
End: 2023-10-19

## 2023-10-19 LAB
ALBUMIN SERPL BCP-MCNC: 3.4 G/DL (ref 3.2–4.9)
ALBUMIN/GLOB SERPL: 1.3 G/DL
ALP SERPL-CCNC: 162 U/L (ref 30–99)
ALT SERPL-CCNC: 11 U/L (ref 2–50)
ANION GAP SERPL CALC-SCNC: 10 MMOL/L (ref 7–16)
AST SERPL-CCNC: 22 U/L (ref 12–45)
BASOPHILS # BLD AUTO: 0.4 % (ref 0–1.8)
BASOPHILS # BLD: 0.04 K/UL (ref 0–0.12)
BILIRUB SERPL-MCNC: 0.2 MG/DL (ref 0.1–1.5)
BUN SERPL-MCNC: 3 MG/DL (ref 8–22)
CALCIUM ALBUM COR SERPL-MCNC: 7.1 MG/DL (ref 8.5–10.5)
CALCIUM SERPL-MCNC: 6.6 MG/DL (ref 8.5–10.5)
CHLORIDE SERPL-SCNC: 95 MMOL/L (ref 96–112)
CO2 SERPL-SCNC: 28 MMOL/L (ref 20–33)
CREAT SERPL-MCNC: 0.46 MG/DL (ref 0.5–1.4)
EOSINOPHIL # BLD AUTO: 0.01 K/UL (ref 0–0.51)
EOSINOPHIL NFR BLD: 0.1 % (ref 0–6.9)
ERYTHROCYTE [DISTWIDTH] IN BLOOD BY AUTOMATED COUNT: 43.9 FL (ref 35.9–50)
GFR SERPLBLD CREATININE-BSD FMLA CKD-EPI: 137 ML/MIN/1.73 M 2
GLOBULIN SER CALC-MCNC: 2.7 G/DL (ref 1.9–3.5)
GLUCOSE SERPL-MCNC: 111 MG/DL (ref 65–99)
HCT VFR BLD AUTO: 34.2 % (ref 37–47)
HGB BLD-MCNC: 11.5 G/DL (ref 12–16)
IMM GRANULOCYTES # BLD AUTO: 0.04 K/UL (ref 0–0.11)
IMM GRANULOCYTES NFR BLD AUTO: 0.4 % (ref 0–0.9)
LYMPHOCYTES # BLD AUTO: 1.63 K/UL (ref 1–4.8)
LYMPHOCYTES NFR BLD: 16.1 % (ref 22–41)
MAGNESIUM SERPL-MCNC: 6.3 MG/DL (ref 1.5–2.5)
MAGNESIUM SERPL-MCNC: 6.5 MG/DL (ref 1.5–2.5)
MAGNESIUM SERPL-MCNC: 6.7 MG/DL (ref 1.5–2.5)
MCH RBC QN AUTO: 29.9 PG (ref 27–33)
MCHC RBC AUTO-ENTMCNC: 33.6 G/DL (ref 32.2–35.5)
MCV RBC AUTO: 89.1 FL (ref 81.4–97.8)
MONOCYTES # BLD AUTO: 0.55 K/UL (ref 0–0.85)
MONOCYTES NFR BLD AUTO: 5.4 % (ref 0–13.4)
NEUTROPHILS # BLD AUTO: 7.88 K/UL (ref 1.82–7.42)
NEUTROPHILS NFR BLD: 77.6 % (ref 44–72)
NRBC # BLD AUTO: 0 K/UL
NRBC BLD-RTO: 0 /100 WBC (ref 0–0.2)
PLATELET # BLD AUTO: 156 K/UL (ref 164–446)
PMV BLD AUTO: 12 FL (ref 9–12.9)
POTASSIUM SERPL-SCNC: 3.1 MMOL/L (ref 3.6–5.5)
PROT SERPL-MCNC: 6.1 G/DL (ref 6–8.2)
RBC # BLD AUTO: 3.84 M/UL (ref 4.2–5.4)
SODIUM SERPL-SCNC: 133 MMOL/L (ref 135–145)
WBC # BLD AUTO: 10.2 K/UL (ref 4.8–10.8)

## 2023-10-19 PROCEDURE — 85025 COMPLETE CBC W/AUTO DIFF WBC: CPT

## 2023-10-19 PROCEDURE — 700111 HCHG RX REV CODE 636 W/ 250 OVERRIDE (IP): Mod: JZ | Performed by: OBSTETRICS & GYNECOLOGY

## 2023-10-19 PROCEDURE — 700111 HCHG RX REV CODE 636 W/ 250 OVERRIDE (IP): Mod: JZ | Performed by: ANESTHESIOLOGY

## 2023-10-19 PROCEDURE — A9270 NON-COVERED ITEM OR SERVICE: HCPCS | Performed by: OBSTETRICS & GYNECOLOGY

## 2023-10-19 PROCEDURE — 700102 HCHG RX REV CODE 250 W/ 637 OVERRIDE(OP): Performed by: OBSTETRICS & GYNECOLOGY

## 2023-10-19 PROCEDURE — 80053 COMPREHEN METABOLIC PANEL: CPT

## 2023-10-19 PROCEDURE — 36415 COLL VENOUS BLD VENIPUNCTURE: CPT

## 2023-10-19 PROCEDURE — 83735 ASSAY OF MAGNESIUM: CPT | Mod: 91

## 2023-10-19 PROCEDURE — 770002 HCHG ROOM/CARE - OB PRIVATE (112)

## 2023-10-19 PROCEDURE — A9270 NON-COVERED ITEM OR SERVICE: HCPCS | Performed by: ANESTHESIOLOGY

## 2023-10-19 PROCEDURE — 700102 HCHG RX REV CODE 250 W/ 637 OVERRIDE(OP): Performed by: ANESTHESIOLOGY

## 2023-10-19 RX ORDER — ONDANSETRON 2 MG/ML
4 INJECTION INTRAMUSCULAR; INTRAVENOUS EVERY 6 HOURS PRN
Status: DISCONTINUED | OUTPATIENT
Start: 2023-10-19 | End: 2023-10-22 | Stop reason: HOSPADM

## 2023-10-19 RX ORDER — ONDANSETRON 4 MG/1
4 TABLET, ORALLY DISINTEGRATING ORAL EVERY 6 HOURS PRN
Status: DISCONTINUED | OUTPATIENT
Start: 2023-10-19 | End: 2023-10-22 | Stop reason: HOSPADM

## 2023-10-19 RX ORDER — DOCUSATE SODIUM 100 MG/1
100 CAPSULE, LIQUID FILLED ORAL 2 TIMES DAILY PRN
Status: DISCONTINUED | OUTPATIENT
Start: 2023-10-19 | End: 2023-10-22 | Stop reason: HOSPADM

## 2023-10-19 RX ORDER — DIPHENHYDRAMINE HYDROCHLORIDE 50 MG/ML
25 INJECTION INTRAMUSCULAR; INTRAVENOUS EVERY 6 HOURS PRN
Status: DISCONTINUED | OUTPATIENT
Start: 2023-10-19 | End: 2023-10-22 | Stop reason: HOSPADM

## 2023-10-19 RX ORDER — LABETALOL 100 MG/1
100 TABLET, FILM COATED ORAL ONCE
Status: COMPLETED | OUTPATIENT
Start: 2023-10-19 | End: 2023-10-19

## 2023-10-19 RX ORDER — LABETALOL 300 MG/1
300 TABLET, FILM COATED ORAL 3 TIMES DAILY
Status: DISCONTINUED | OUTPATIENT
Start: 2023-10-19 | End: 2023-10-21

## 2023-10-19 RX ORDER — OXYCODONE HYDROCHLORIDE 5 MG/1
5 TABLET ORAL EVERY 4 HOURS PRN
Status: DISCONTINUED | OUTPATIENT
Start: 2023-10-19 | End: 2023-10-22 | Stop reason: HOSPADM

## 2023-10-19 RX ORDER — DIPHENHYDRAMINE HCL 25 MG
25 TABLET ORAL EVERY 6 HOURS PRN
Status: DISCONTINUED | OUTPATIENT
Start: 2023-10-19 | End: 2023-10-22 | Stop reason: HOSPADM

## 2023-10-19 RX ORDER — SODIUM CHLORIDE, SODIUM LACTATE, POTASSIUM CHLORIDE, CALCIUM CHLORIDE 600; 310; 30; 20 MG/100ML; MG/100ML; MG/100ML; MG/100ML
INJECTION, SOLUTION INTRAVENOUS PRN
Status: DISCONTINUED | OUTPATIENT
Start: 2023-10-19 | End: 2023-10-22 | Stop reason: HOSPADM

## 2023-10-19 RX ORDER — OXYCODONE HYDROCHLORIDE 10 MG/1
10 TABLET ORAL EVERY 4 HOURS PRN
Status: DISCONTINUED | OUTPATIENT
Start: 2023-10-19 | End: 2023-10-22 | Stop reason: HOSPADM

## 2023-10-19 RX ORDER — HYDRALAZINE HYDROCHLORIDE 20 MG/ML
10 INJECTION INTRAMUSCULAR; INTRAVENOUS
Status: DISCONTINUED | OUTPATIENT
Start: 2023-10-19 | End: 2023-10-22 | Stop reason: HOSPADM

## 2023-10-19 RX ORDER — ACETAMINOPHEN 500 MG
1000 TABLET ORAL EVERY 6 HOURS PRN
Status: DISCONTINUED | OUTPATIENT
Start: 2023-10-22 | End: 2023-10-22 | Stop reason: HOSPADM

## 2023-10-19 RX ORDER — IBUPROFEN 800 MG/1
800 TABLET ORAL EVERY 8 HOURS
Status: DISCONTINUED | OUTPATIENT
Start: 2023-10-19 | End: 2023-10-22 | Stop reason: HOSPADM

## 2023-10-19 RX ORDER — NIFEDIPINE 30 MG/1
30 TABLET, EXTENDED RELEASE ORAL
Status: DISCONTINUED | OUTPATIENT
Start: 2023-10-19 | End: 2023-10-21

## 2023-10-19 RX ORDER — POTASSIUM CHLORIDE 20 MEQ/1
20 TABLET, EXTENDED RELEASE ORAL 2 TIMES DAILY
Status: DISCONTINUED | OUTPATIENT
Start: 2023-10-19 | End: 2023-10-21

## 2023-10-19 RX ORDER — CALCIUM GLUCONATE 94 MG/ML
1 INJECTION, SOLUTION INTRAVENOUS
Status: DISCONTINUED | OUTPATIENT
Start: 2023-10-19 | End: 2023-10-19 | Stop reason: HOSPADM

## 2023-10-19 RX ORDER — IBUPROFEN 800 MG/1
800 TABLET ORAL EVERY 8 HOURS PRN
Status: DISCONTINUED | OUTPATIENT
Start: 2023-10-22 | End: 2023-10-22 | Stop reason: HOSPADM

## 2023-10-19 RX ORDER — ACETAMINOPHEN 500 MG
1000 TABLET ORAL EVERY 6 HOURS
Status: DISCONTINUED | OUTPATIENT
Start: 2023-10-19 | End: 2023-10-22 | Stop reason: HOSPADM

## 2023-10-19 RX ADMIN — POTASSIUM CHLORIDE 20 MEQ: 1500 TABLET, EXTENDED RELEASE ORAL at 18:27

## 2023-10-19 RX ADMIN — KETOROLAC TROMETHAMINE 30 MG: 30 INJECTION, SOLUTION INTRAMUSCULAR; INTRAVENOUS at 02:22

## 2023-10-19 RX ADMIN — POTASSIUM CHLORIDE 20 MEQ: 1500 TABLET, EXTENDED RELEASE ORAL at 06:38

## 2023-10-19 RX ADMIN — ONDANSETRON 4 MG: 2 INJECTION INTRAMUSCULAR; INTRAVENOUS at 00:52

## 2023-10-19 RX ADMIN — LABETALOL HYDROCHLORIDE 200 MG: 100 TABLET, FILM COATED ORAL at 08:00

## 2023-10-19 RX ADMIN — LABETALOL HYDROCHLORIDE 300 MG: 300 TABLET, FILM COATED ORAL at 14:28

## 2023-10-19 RX ADMIN — ACETAMINOPHEN 1000 MG: 500 TABLET, FILM COATED ORAL at 08:49

## 2023-10-19 RX ADMIN — LABETALOL HYDROCHLORIDE 300 MG: 300 TABLET, FILM COATED ORAL at 20:20

## 2023-10-19 RX ADMIN — MAGNESIUM SULFATE HEPTAHYDRATE 2 G/HR: 40 INJECTION, SOLUTION INTRAVENOUS at 12:09

## 2023-10-19 RX ADMIN — IBUPROFEN 800 MG: 800 TABLET, FILM COATED ORAL at 18:26

## 2023-10-19 RX ADMIN — NIFEDIPINE 30 MG: 30 TABLET, FILM COATED, EXTENDED RELEASE ORAL at 08:50

## 2023-10-19 RX ADMIN — LABETALOL HYDROCHLORIDE 200 MG: 100 TABLET, FILM COATED ORAL at 00:38

## 2023-10-19 RX ADMIN — LABETALOL HYDROCHLORIDE 100 MG: 100 TABLET, FILM COATED ORAL at 12:11

## 2023-10-19 RX ADMIN — ACETAMINOPHEN 1000 MG: 500 TABLET, FILM COATED ORAL at 18:27

## 2023-10-19 RX ADMIN — ACETAMINOPHEN 1000 MG: 500 TABLET, FILM COATED ORAL at 02:22

## 2023-10-19 ASSESSMENT — PAIN DESCRIPTION - PAIN TYPE
TYPE: ACUTE PAIN
TYPE: SURGICAL PAIN

## 2023-10-19 NOTE — PROGRESS NOTES
0700. Report from Mari HIGGINS. POC discussed and resumed.    0737. At the bedside. Pt resting. Pt has pain 2/10 that she hurtado not require intervention for. FF@U-3 with light lochia. Attempted to latch, baby too sleepy. Discussed using a breast pump, pt would like to start using one. Pt has no keke st this time.    0730. Lactation consult put in.    0940. Attempt to breast feed, unable to latch, attempt to bottle feed, baby gaging with no suck.    1020. Lactation consulted due to lack of coordination with breast and bottle feeding.    1100. Lactation at the bedside.    1200. Dr. Nieves notified of elevated BP, orders to 100 mg of PO Labetalol now and increase her maintained dose to 300 mg TID.    1600. Pt ambulated to the restroom, voided, shahana care done.

## 2023-10-19 NOTE — PROGRESS NOTES
"2000 Report received from Yael HIGGINS, POC discussed and care assumed. Pt rsting comfortably in bed, v/s stable. Infant in bassinet.     Severe range BP's reported to Dr. Sahni, orders for IV Labetalol and new orders for PO labetalol TID. Pt reports she was vomiting when BP cuff went off, Zofran given see MAR.    Elevated Mag level reported to Dr. Sahni, orders for rate change see MAR. Pt asymptomatic.    Pt reporting infant not eating, that \"baby does not like the formula\". Pt shown chin support technique to aid infant feeding. Pt aware that lactation will meet with them today.    0700 Report given to Theodore HIGGINS, POC discussed and care relinquished.      "

## 2023-10-19 NOTE — ANESTHESIA POSTPROCEDURE EVALUATION
Patient: Mare Eason    Procedure Summary     Date: 10/18/23 Room / Location: [facility] OR 02 / SURGERY LABOR AND DELIVERY    Anesthesia Start: 1336 Anesthesia Stop: 1442    Procedure:  SECTION, REPEAT (Abdomen) Diagnosis:        delivery delivered      (Same, Del)    Surgeons: Shaila Sahni D.O. Responsible Provider: Jitendra Uribe M.D.    Anesthesia Type: spinal ASA Status: 2 - Emergent          Final Anesthesia Type: spinal  Last vitals  BP   Blood Pressure: (!) 161/107    Temp   36.4 °C (97.6 °F)    Pulse   78   Resp   18    SpO2   98 %      Anesthesia Post Evaluation    Patient location during evaluation: PACU  Patient participation: complete - patient participated  Level of consciousness: awake and alert    Airway patency: patent  Anesthetic complications: no  Cardiovascular status: hemodynamically stable  Respiratory status: acceptable  Hydration status: euvolemic    PONV: none          There were no known notable events for this encounter.     Nurse Pain Score: 3 (NPRS)

## 2023-10-19 NOTE — PROGRESS NOTES
Obstetrics & Gynecology Post-Delivery Progress Note    Date of Service  10/19/2023    23 y.o.  1 s/p , Low Transverse   Delivery date: 10/18/23  Breastfeeding: Unknown    Events  No events    Subjective  Pain: No  Bleeding: lochia minimal  PO's: taking clears  Voiding: graves in place  Ambulating: no  Feeding:  trying to breastfeed, having difficulty with latch    Objective  Temp:  [35.8 °C (96.4 °F)-36.4 °C (97.6 °F)] 35.9 °C (96.6 °F)  Pulse:  [] 76  Resp:  [16-18] 16  BP: (125-197)/() 151/92  SpO2:  [33 %-100 %] 94 %    Physical   Exam:  Gen: A&Ox3 NAD  CV: no edema or cyanosis  Resp: normal effort  Abd: soft, NT, FF and below U  Ext: no edema or TTP  Psych: normal mood and affect      Lab Results   Component Value Date    RBC 3.84 (L) 10/19/2023    ABOGROUP O 10/18/2023    RH POS 10/18/2023     Immunization History   Administered Date(s) Administered    Dtap Vaccine 10/17/2011    HPV Quadrivalent Vaccine (GARDASIL) - HISTORICAL DATA 10/17/2011, 2012, 2013    MENING VAC SERO B 2 DOSE SCHED IM (BEXSERO) 10/17/2011, 2018    Meningococcal Conjugate Vaccine MCV4 (Menactra) 2013, 2018    Tdap Vaccine 2013, 2020       Assessment/Plan  Mare Eason is a 23 y.o.  postpartum day 1 s/p , Low Transverse .    1. Post care: meeting all goals  2. Hemodynamics: stable  3. Pain: controlled  4. PNL:   Lab Results   Component Value Date    RH POS 10/18/2023    RUBELLAIGG 48.10 2023     5. Method of Feeding: plans to breastfeed, needs lactation consultant   7. Vaccinations:   Immunization History   Administered Date(s) Administered    Dtap Vaccine 10/17/2011    HPV Quadrivalent Vaccine (GARDASIL) - HISTORICAL DATA 10/17/2011, 2012, 2013    MENING VAC SERO B 2 DOSE SCHED IM (BEXSERO) 10/17/2011, 2018    Meningococcal Conjugate Vaccine MCV4 (Menactra) 2013, 2018    Tdap Vaccine 2013,  01/26/2020     8. Disposition: likely home postop day 3  9. Pre-eclampsia with severe features- delivered. MgSO4 to continue 24 hours post delivery. Hypertensive maintenance medications initiated labetalol 200 TID. Have tried nifedipine and hydralazine as well and she has responded best to labetalol.   10. Hypokalemia- improved after replacement. Continue 20 meq KDUR BID. Recheck CMP tomorrow AM.     No new Assessment & Plan notes have been filed under this hospital service since the last note was generated.  Service: Obstetrics & Gynecology       VTE prophylaxis: SCDs until ambulatory     Shaila Sahni D.O.

## 2023-10-19 NOTE — DISCHARGE PLANNING
Discharge Planning Assessment Post Partum    Reason for Referral: History of THC  Address: [address] [place] [address] [address] [place] [address]  Phone: [phone]  Type of Living Situation: living with FOB and daughter  Mom Diagnosis: Pregnancy, , preeclampsia  Baby Diagnosis: -36.6 weeks  Primary Language: English    Name of Baby: Chaz Galeana (: 10/18/[birth year])  Father of the Baby: Lew Galeana (: 98)  Involved in baby’s care? Yes  Contact Information: same number as MOB's: [phone]    Prenatal Care: Yes-[facility] starting at 12 weeks  Mom's PCP: No PCP listed  PCP for new baby: Pediatrician list provided to parents     Support System: FOB  Coping/Bonding between mother & baby: Yes  Source of Feeding: breast and bottle.  MOB is pumping   Supplies for Infant: prepared for infant    Mom's Insurance: [employer]  Baby Covered on Insurance:Yes  Mother Employed/School: Not currently  Other children in the home/names & ages: 3 year old daughter: Omar Eason (: 20)    Financial Hardship/Income: No   Mom's Mental status: alert and oriented  Services used prior to admit: Medicaid and food stamps    CPS History: Report called in to Neha Anna with [facility] due to infant testing positive for THC and opiates.  Psychiatric History: No  Domestic Violence History: No  Drug/ETOH History: Infant positive for THC and opiates.  MOB admits to using THC to help with appetite during pregnancy and denies any other substances.  MOB was given intrathecal Duramorph injection during labor (per RN, this would not show up in infant's UDS).  Infant will be started on Luisito scoring.    Resources Provided: pediatrician list, children and family resource list, post partum support and counseling resources, diaper bank assistance resources, and list of [facility] clinics provided   Referrals Made: diaper bank referral and report called in to [facility]     Clearance for Discharge:  Infant is cleared to discharge home  with parents once medically cleared

## 2023-10-19 NOTE — LACTATION NOTE
This note was copied from a baby's chart.  Baby is born via C/S at 36.3 wks. O is a P2 with HTN and Pre E. Mom remains on Mag in LD. LC asked to follow up with mom for pumping and feeding education.   Baby is 36.3 wks and has not yet breast fed. Mom was started pumping. Baby is positive for THC and opiates, however mother admits to only THC use. LC discouraged mom offering any EXBM when using illicit drugs. Mom nods in acknowledgement.  LC demonstrated paced bottle feeding with parents using the purple nipple top.  Baby is disorganized and spillage is noted with feeds. Baby will open and cue for feed but thrusts out the nipple and gags. Baby has immature and inconsistent pattern when suckling.  Lc was able to feed 13 mls from bottle over 25-30 minutes with excess spillage on blanket. LC would approximate intake for feed at 8-10 mls.  SLP order was placed for further evaluation. SSW will see mom as well.  Lactation support available as needed.

## 2023-10-20 LAB
ERYTHROCYTE [DISTWIDTH] IN BLOOD BY AUTOMATED COUNT: 41.9 FL (ref 35.9–50)
HCT VFR BLD AUTO: 28 % (ref 37–47)
HGB BLD-MCNC: 9.2 G/DL (ref 12–16)
MCH RBC QN AUTO: 28.1 PG (ref 27–33)
MCHC RBC AUTO-ENTMCNC: 32.9 G/DL (ref 32.2–35.5)
MCV RBC AUTO: 85.6 FL (ref 81.4–97.8)
PLATELET # BLD AUTO: 138 K/UL (ref 164–446)
PMV BLD AUTO: 11.8 FL (ref 9–12.9)
RBC # BLD AUTO: 3.27 M/UL (ref 4.2–5.4)
WBC # BLD AUTO: 8.5 K/UL (ref 4.8–10.8)

## 2023-10-20 PROCEDURE — 85027 COMPLETE CBC AUTOMATED: CPT

## 2023-10-20 PROCEDURE — A9270 NON-COVERED ITEM OR SERVICE: HCPCS | Performed by: OBSTETRICS & GYNECOLOGY

## 2023-10-20 PROCEDURE — 700102 HCHG RX REV CODE 250 W/ 637 OVERRIDE(OP): Performed by: OBSTETRICS & GYNECOLOGY

## 2023-10-20 PROCEDURE — 36415 COLL VENOUS BLD VENIPUNCTURE: CPT

## 2023-10-20 PROCEDURE — 770002 HCHG ROOM/CARE - OB PRIVATE (112)

## 2023-10-20 RX ADMIN — IBUPROFEN 800 MG: 800 TABLET, FILM COATED ORAL at 05:41

## 2023-10-20 RX ADMIN — LABETALOL HYDROCHLORIDE 300 MG: 300 TABLET, FILM COATED ORAL at 09:24

## 2023-10-20 RX ADMIN — IBUPROFEN 800 MG: 800 TABLET, FILM COATED ORAL at 15:04

## 2023-10-20 RX ADMIN — IBUPROFEN 800 MG: 800 TABLET, FILM COATED ORAL at 23:19

## 2023-10-20 RX ADMIN — ACETAMINOPHEN 1000 MG: 500 TABLET, FILM COATED ORAL at 18:20

## 2023-10-20 RX ADMIN — ACETAMINOPHEN 1000 MG: 500 TABLET, FILM COATED ORAL at 12:19

## 2023-10-20 RX ADMIN — ACETAMINOPHEN 1000 MG: 500 TABLET, FILM COATED ORAL at 05:41

## 2023-10-20 RX ADMIN — LABETALOL HYDROCHLORIDE 300 MG: 300 TABLET, FILM COATED ORAL at 15:06

## 2023-10-20 RX ADMIN — LABETALOL HYDROCHLORIDE 300 MG: 300 TABLET, FILM COATED ORAL at 21:01

## 2023-10-20 RX ADMIN — POTASSIUM CHLORIDE 20 MEQ: 1500 TABLET, EXTENDED RELEASE ORAL at 18:20

## 2023-10-20 RX ADMIN — NIFEDIPINE 30 MG: 30 TABLET, FILM COATED, EXTENDED RELEASE ORAL at 05:41

## 2023-10-20 RX ADMIN — POTASSIUM CHLORIDE 20 MEQ: 1500 TABLET, EXTENDED RELEASE ORAL at 05:41

## 2023-10-20 ASSESSMENT — PAIN DESCRIPTION - PAIN TYPE
TYPE: ACUTE PAIN
TYPE: ACUTE PAIN;SURGICAL PAIN
TYPE: ACUTE PAIN
TYPE: ACUTE PAIN;SURGICAL PAIN
TYPE: ACUTE PAIN

## 2023-10-20 NOTE — PROGRESS NOTES
0705- Bedside report received from GISELA Ramirez.  Patient denied needs.  0924- Blood pressure = 153/107.  Patient denied headache, blurred vision, epigastric pain, and nausea.  Patient medicated with labetalol as ordered.  1000- Blood pressure = 154/100.  1030- Dr. James notified of patient's blood pressures this shift and that patient was medicated with labetalol as ordered.  Telephone order received to re-check blood pressure in 30 minutes.  1100- Blood pressure = 140/93.  Dr. James notified of blood pressure and that patient requested to get off of the unit and go out for fresh air.  Per Dr. James, patient may go off the unit for fresh air.

## 2023-10-20 NOTE — PROGRESS NOTES
1915- Received report from GISELA Albert. Assumed care of pt. Declines needs at this time. 12 hour chart check, MAR and orders reviewed.     2000- Assessment complete. Fundus firm and palpable, lochia light rubra. Pain management discussed with pt. Pt will call for pain medication as needed. Ambulating and voiding without difficulty. POC discussed. All questions and concerns discussed at this time. Bedside table and call light within reach.

## 2023-10-20 NOTE — CARE PLAN
The patient is Stable - Low risk of patient condition declining or worsening    Shift Goals  Clinical Goals: VSS, lochia WNL  Patient Goals: rest, pain control, breastfeeding    Progress made toward(s) clinical / shift goals:    Problem: Knowledge Deficit - Postpartum  Goal: Patient will verbalize and demonstrate understanding of self and infant care  Outcome: Progressing     Problem: Psychosocial - Postpartum  Goal: Patient will verbalize and demonstrate effective bonding and parenting behavior  Outcome: Progressing     Problem: Altered Physiologic Condition  Goal: Patient physiologically stable as evidenced by normal lochia, palpable uterine involution and vitals within normal limits  Outcome: Progressing       Patient is not progressing towards the following goals:

## 2023-10-20 NOTE — PROGRESS NOTES
POSTPARTUM AFTER C/SECTION    Name:   Mare Eason   Date/Time:  10/20/2023 6:25 AM  Gestational Age:  36w6d now postpartum  Admit Date:   10/18/2023  Admitting Dx:   Indication for care in labor or delivery [O75.9]; pre-eclampsia with severe features    POD# 2 S/P  R LTCS    S:  Abdominal pain yes - normal post op  Ambulating   yes  Tolerating PO  yes  Flatus    yes  Bleeding   Normal lochia  Voiding   yes   Dizziness   no  Breast feeding  no  Breast tenderness  no    O:  Vitals:    10/19/23 2150 10/20/23 0204 10/20/23 0603 10/20/23 0645   BP: 135/85 (!) 145/93 (!) 154/101 130/82   Pulse: 69 74 80 74   Resp: 18 18 18 18   Temp: 36 °C (96.8 °F) 37 °C (98.6 °F) 37.1 °C (98.7 °F)    TempSrc: Temporal Temporal Temporal    SpO2: 98% 99% 99%    Weight:       Height:         Lungs:  no respiratory distress  Abdomen: soft, normal post op tenderness, + BS  Incision: mepelex is clean and dry  Extremities: non-tender  Catheter: DC'd    Recent Labs     10/18/23  1155 10/19/23  0019 10/20/23  0452   WBC 5.8 10.2 8.5   RBC 3.96* 3.84* 3.27*   HEMOGLOBIN 11.2* 11.5* 9.2*   HEMATOCRIT 33.7* 34.2* 28.0*   MCV 85.1 89.1 85.6   MCH 28.3 29.9 28.1   RDW 40.8 43.9 41.9   PLATELETCT 143* 156* 138*   MPV 12.1 12.0 11.8   NEUTSPOLYS 64.50 77.60*  --    LYMPHOCYTES 26.20 16.10*  --    MONOCYTES 8.00 5.40  --    EOSINOPHILS 0.30 0.10  --    BASOPHILS 0.30 0.40  --        A:  POD# 2 S/P  R LTCS  Stable/progressing well  Severe pre-eclampsia, delivered, on labetalol 300mg po TID and Procardia 30mg XL    P:  Routine C/S Postpartum care, continue pain management, encourage ambulation, anticipate DC POD# 3-4      Fawn Nieves MD

## 2023-10-20 NOTE — PROGRESS NOTES
0606- Patient /101. Notified Sally RAMIREZ To recheck BP in one hour and notify if BP still elevated.

## 2023-10-21 PROCEDURE — A9270 NON-COVERED ITEM OR SERVICE: HCPCS | Performed by: OBSTETRICS & GYNECOLOGY

## 2023-10-21 PROCEDURE — A9270 NON-COVERED ITEM OR SERVICE: HCPCS | Performed by: STUDENT IN AN ORGANIZED HEALTH CARE EDUCATION/TRAINING PROGRAM

## 2023-10-21 PROCEDURE — 700111 HCHG RX REV CODE 636 W/ 250 OVERRIDE (IP): Mod: JZ | Performed by: OBSTETRICS & GYNECOLOGY

## 2023-10-21 PROCEDURE — 770002 HCHG ROOM/CARE - OB PRIVATE (112)

## 2023-10-21 PROCEDURE — 700102 HCHG RX REV CODE 250 W/ 637 OVERRIDE(OP): Performed by: ADVANCED PRACTICE MIDWIFE

## 2023-10-21 PROCEDURE — 700102 HCHG RX REV CODE 250 W/ 637 OVERRIDE(OP): Performed by: OBSTETRICS & GYNECOLOGY

## 2023-10-21 PROCEDURE — 700102 HCHG RX REV CODE 250 W/ 637 OVERRIDE(OP): Performed by: STUDENT IN AN ORGANIZED HEALTH CARE EDUCATION/TRAINING PROGRAM

## 2023-10-21 PROCEDURE — A9270 NON-COVERED ITEM OR SERVICE: HCPCS | Performed by: ADVANCED PRACTICE MIDWIFE

## 2023-10-21 PROCEDURE — 700105 HCHG RX REV CODE 258: Performed by: ADVANCED PRACTICE MIDWIFE

## 2023-10-21 PROCEDURE — 700111 HCHG RX REV CODE 636 W/ 250 OVERRIDE (IP)

## 2023-10-21 RX ORDER — PROCHLORPERAZINE EDISYLATE 5 MG/ML
10 INJECTION INTRAMUSCULAR; INTRAVENOUS EVERY 6 HOURS PRN
Status: DISCONTINUED | OUTPATIENT
Start: 2023-10-21 | End: 2023-10-21

## 2023-10-21 RX ORDER — NIFEDIPINE 30 MG/1
60 TABLET, EXTENDED RELEASE ORAL EVERY EVENING
Status: DISCONTINUED | OUTPATIENT
Start: 2023-10-21 | End: 2023-10-22 | Stop reason: HOSPADM

## 2023-10-21 RX ORDER — LABETALOL 300 MG/1
300 TABLET, FILM COATED ORAL EVERY 8 HOURS
Status: DISCONTINUED | OUTPATIENT
Start: 2023-10-22 | End: 2023-10-22

## 2023-10-21 RX ORDER — NIFEDIPINE 10 MG/1
10 CAPSULE ORAL EVERY 8 HOURS
Status: DISCONTINUED | OUTPATIENT
Start: 2023-10-21 | End: 2023-10-22

## 2023-10-21 RX ORDER — FERROUS SULFATE 325(65) MG
325 TABLET ORAL
Status: DISCONTINUED | OUTPATIENT
Start: 2023-10-22 | End: 2023-10-22 | Stop reason: HOSPADM

## 2023-10-21 RX ORDER — DEXTROSE, SODIUM CHLORIDE, SODIUM LACTATE, POTASSIUM CHLORIDE, AND CALCIUM CHLORIDE 5; .6; .31; .03; .02 G/100ML; G/100ML; G/100ML; G/100ML; G/100ML
INJECTION, SOLUTION INTRAVENOUS CONTINUOUS
Status: DISCONTINUED | OUTPATIENT
Start: 2023-10-21 | End: 2023-10-22 | Stop reason: HOSPADM

## 2023-10-21 RX ORDER — NIFEDIPINE 10 MG/1
20 CAPSULE ORAL ONCE
Status: DISCONTINUED | OUTPATIENT
Start: 2023-10-21 | End: 2023-10-21

## 2023-10-21 RX ORDER — LABETALOL 300 MG/1
300 TABLET, FILM COATED ORAL EVERY 8 HOURS
Status: DISCONTINUED | OUTPATIENT
Start: 2023-10-22 | End: 2023-10-21

## 2023-10-21 RX ORDER — LABETALOL 300 MG/1
600 TABLET, FILM COATED ORAL EVERY 8 HOURS
Status: DISCONTINUED | OUTPATIENT
Start: 2023-10-21 | End: 2023-10-21

## 2023-10-21 RX ORDER — NIFEDIPINE 30 MG/1
60 TABLET, EXTENDED RELEASE ORAL
Status: DISCONTINUED | OUTPATIENT
Start: 2023-10-22 | End: 2023-10-21

## 2023-10-21 RX ORDER — FUROSEMIDE 10 MG/ML
20 INJECTION INTRAMUSCULAR; INTRAVENOUS
Status: DISCONTINUED | OUTPATIENT
Start: 2023-10-21 | End: 2023-10-21

## 2023-10-21 RX ORDER — LABETALOL 100 MG/1
200 TABLET, FILM COATED ORAL ONCE
Status: COMPLETED | OUTPATIENT
Start: 2023-10-21 | End: 2023-10-21

## 2023-10-21 RX ADMIN — IBUPROFEN 800 MG: 800 TABLET, FILM COATED ORAL at 07:02

## 2023-10-21 RX ADMIN — POTASSIUM CHLORIDE 20 MEQ: 1500 TABLET, EXTENDED RELEASE ORAL at 05:37

## 2023-10-21 RX ADMIN — LABETALOL HYDROCHLORIDE 400 MG: 300 TABLET, FILM COATED ORAL at 04:11

## 2023-10-21 RX ADMIN — LABETALOL HYDROCHLORIDE 200 MG: 100 TABLET, FILM COATED ORAL at 07:33

## 2023-10-21 RX ADMIN — ONDANSETRON 4 MG: 2 INJECTION INTRAMUSCULAR; INTRAVENOUS at 14:34

## 2023-10-21 RX ADMIN — ACETAMINOPHEN 1000 MG: 500 TABLET, FILM COATED ORAL at 13:02

## 2023-10-21 RX ADMIN — NIFEDIPINE 30 MG: 30 TABLET, FILM COATED, EXTENDED RELEASE ORAL at 05:37

## 2023-10-21 RX ADMIN — PROCHLORPERAZINE EDISYLATE 10 MG: 5 INJECTION, SOLUTION INTRAMUSCULAR; INTRAVENOUS at 17:44

## 2023-10-21 RX ADMIN — ACETAMINOPHEN 1000 MG: 500 TABLET, FILM COATED ORAL at 00:28

## 2023-10-21 RX ADMIN — NIFEDIPINE 60 MG: 30 TABLET, FILM COATED, EXTENDED RELEASE ORAL at 21:46

## 2023-10-21 RX ADMIN — NIFEDIPINE 10 MG: 10 CAPSULE ORAL at 21:46

## 2023-10-21 RX ADMIN — ACETAMINOPHEN 1000 MG: 500 TABLET, FILM COATED ORAL at 07:02

## 2023-10-21 RX ADMIN — ONDANSETRON 4 MG: 2 INJECTION INTRAMUSCULAR; INTRAVENOUS at 21:09

## 2023-10-21 RX ADMIN — LABETALOL HYDROCHLORIDE 600 MG: 300 TABLET, FILM COATED ORAL at 12:21

## 2023-10-21 RX ADMIN — FUROSEMIDE 20 MG: 10 INJECTION, SOLUTION INTRAVENOUS at 11:52

## 2023-10-21 RX ADMIN — SODIUM CHLORIDE, SODIUM LACTATE, POTASSIUM CHLORIDE, CALCIUM CHLORIDE AND DEXTROSE MONOHYDRATE: 5; 600; 310; 30; 20 INJECTION, SOLUTION INTRAVENOUS at 21:11

## 2023-10-21 ASSESSMENT — EDINBURGH POSTNATAL DEPRESSION SCALE (EPDS)
I HAVE BEEN ABLE TO LAUGH AND SEE THE FUNNY SIDE OF THINGS: AS MUCH AS I ALWAYS COULD
I HAVE BLAMED MYSELF UNNECESSARILY WHEN THINGS WENT WRONG: NOT VERY OFTEN
I HAVE BEEN ANXIOUS OR WORRIED FOR NO GOOD REASON: NO, NOT AT ALL
THE THOUGHT OF HARMING MYSELF HAS OCCURRED TO ME: NEVER
I HAVE LOOKED FORWARD WITH ENJOYMENT TO THINGS: RATHER LESS THAN I USED TO
I HAVE BEEN SO UNHAPPY THAT I HAVE BEEN CRYING: NO, NEVER
THINGS HAVE BEEN GETTING ON TOP OF ME: NO, MOST OF THE TIME I HAVE COPED QUITE WELL
I HAVE FELT SAD OR MISERABLE: NOT VERY OFTEN
I HAVE FELT SCARED OR PANICKY FOR NO GOOD REASON: NO, NOT AT ALL
I HAVE BEEN SO UNHAPPY THAT I HAVE HAD DIFFICULTY SLEEPING: NOT AT ALL

## 2023-10-21 ASSESSMENT — PAIN DESCRIPTION - PAIN TYPE
TYPE: ACUTE PAIN;SURGICAL PAIN
TYPE: SURGICAL PAIN;ACUTE PAIN
TYPE: ACUTE PAIN;SURGICAL PAIN

## 2023-10-21 NOTE — CARE PLAN
The patient is Watcher - Medium risk of patient condition declining or worsening    Shift Goals  Clinical Goals: Maintain fundus firm, lochia light; pain management; pt to ambulate  Patient Goals: rest, pain control, breastfeeding    Progress made toward(s) clinical / shift goals:  Fundus firm, lochia scant; pt reported pain relief with use of scheduled pain medication; pt ambulated in her room; pt encouraged to ambulate in the hallways.    Patient is not progressing towards the following goals:    Problem: Altered Physiologic Condition  Goal: Patient physiologically stable as evidenced by normal lochia, palpable uterine involution and vitals within normal limits  Outcome: Not Progressing  Note: Blood pressures elevated.  MD notified.

## 2023-10-21 NOTE — PROGRESS NOTES
Obstetrics & Gynecology Post-Delivery Progress Note    Date of Service  10/21/2023  Post-partum day #3     23 y.o.  s/p  for repeat  Delivery date: 10/18/023      Subjective  Pt reports no complaints this morning.  She feels tired.  She is wanting to be discharged home tonight.  She denies any headache, chest pain, vision changes, abdominal pain, leg swelling or other complaints.  She has been meeting postpartum goals, reports no concerns with voiding.  She is passing flatus but does report some gas pains today.    Pain: Well controlled  Bleeding: lochia minimal  Tolerating PO: yes  Voiding: without difficulty  Ambulating: yes  Passing flatus: Yes      Objective  24hr VS:  Temp:  [36.2 °C (97.2 °F)-37.1 °C (98.7 °F)] 36.2 °C (97.2 °F)  Pulse:  [] 102  Resp:  [16-20] 20  BP: (136-170)/() 153/113  SpO2:  [96 %-100 %] 100 %    Physical Exam  Gen: well appearing, no apparent distress, resting comfortably in bed  CV: rrr, no m/r/g  Resp: CTAB, symmetric breath sounds  Abd: soft, nontender, nondistended  Fundus: firm and below umbilicus  Incision: dressing clean, dry, intact and no significant drainage  Ext: symmetric, no peripheral edema, calves nontender    Labs:  Hgb 9.2    Medications  labetalol, 600 mg, Oral, Q8HRS  furosemide, 20 mg, Intravenous, Q DAY  NIFEdipine IR, 20 mg, Oral, Once  ibuprofen, 800 mg, Oral, Q8HRS  acetaminophen, 1,000 mg, Oral, Q6HRS  potassium chloride SA, 20 mEq, Oral, BID  NIFEdipine SR, 30 mg, Oral, Q DAY      PRN medications: prochlorperazine, LR, ibuprofen **FOLLOWED BY** [START ON 10/22/2023] ibuprofen, acetaminophen **FOLLOWED BY** [START ON 10/22/2023] acetaminophen, oxyCODONE immediate-release, oxyCODONE immediate release, ondansetron **OR** ondansetron, diphenhydrAMINE **OR** diphenhydrAMINE, docusate sodium, hydrALAZINE, oxytocin, carboPROST      Assessment/Plan  Mare Keren Shadonn Jenaro is a 23 y.o.yo  postpartum day #3  s/p  for  "repeat.    #Pre-eclampsia with severe features  -Magnesium discontinued on 10/19 @1230   -Blood pressures still in severe range at times, ranging from 154-162/   -BP regimen increased to Procardia 30 mg XL once daily and labetalol titrated to 600 every 8 hours.  Gave 200 mg x 1 in addition this morning, and lasix 20mg IV. BP still 150s/110s. Patient then developed nausea, was treated with Zofran 4 mg x 1 and Compazine 10 mg x 1 IV.    -Nifedipine 20mg IR ordered, to be given to continue with BP control   -Of note, patient was strongly desiring discharge home today, on postpartum day 3.  I had a lengthy conversation with her and her partner, Lew, regarding the dangers of her elevated blood pressures and its implication on her immediate and future health. They were willing to stay into the afternoon to try to get blood pressure under control, but were resistant to trying new medications and taking additional doses of medications in order to get her blood pressure controlled.  They voiced many frustrations regarding having to stay.  Patient voiced that she \"did not care\" about her elevated blood pressure.    -Continue BP regimen overnight, follow q4h     #Acute blood loss anemia  -Post-delivery CBC with Hgb 9.2   -Asymptomatic  -Ferrous sulfate supplementation     #Postpartum care  - Post care: meeting all goals  - Pain: controlled    Disposition: likely home PPD #4    Tejal James D.O.  PGY-1, [employer] [facility]      "

## 2023-10-21 NOTE — CARE PLAN
The patient is Watcher - Medium risk of patient condition declining or worsening    Shift Goals  Clinical Goals: Stable BP, Pain control  Patient Goals:    Progress made toward(s) clinical / shift goals: Pain being controlled with scheduled tylenol and motrin.     Patient is not progressing towards the following goals: Patient post mag, on q 4 hr VS. Bps still high, patient denies any headaches and or blurry vision. Medication doses changed this am.

## 2023-10-21 NOTE — PROGRESS NOTES
0700- Bedside report received from GISELA Fong.  Patient denied needs.  0830- Dr. Goodman on the unit and notified that patient received labetalol 200 mg PO as ordered and that patient's next blood pressure check is approximately at 10:00 a.m.  Per Dr. Goodman, no new orders.  1005- Patient's blood pressure = 159/113 in the left upper arm and 159/112 in the right upper arm.  Patient denied headache, blurred vision, epigastric pain, and nausea.  Patient denied pain.  1028- Dr. James notified of patient's blood pressure.  Per Dr. James, give lasix as ordered.  No further orders at this time.  1045- Patient refused lasix at this time and would like to speak to the MD.  Dr. James notified.  Per Dr. James, she will come talk to the patient.  1120- Per patient, after she spoke with the MD, she is willing to receive lasix.  IV flush attempted.  IV leaking.  1152- IV restarted by GIESLA Domingo.  IV Lasix given as ordered.  1221- Blood pressure = 170/121 in the right upper arm and 169/120 in the left upper arm.  Patient denied headache, blurred vision, epigastric pain, and nausea.  Labetalol given as ordered.  At 1229, call placed to Dr. James.  Awaiting call back.  At 1240, Dr. James notified of patient's blood pressures and that patient received a dose of lasix as ordered and labetalol as ordered.  Per Dr. James, re-check blood pressure in 15 minutes.  1302- Patient back in bed from the bathroom.  Blood pressure = 151/112 in the right upper arm and 151/111 left upper arm.  At 1315, call placed to Dr. James.  Awaiting call back.  At 1328, call placed to Dr. James.  Awaiting call back.  At 1356- Dr. James stated she is aware of patient's blood pressures and that the patient's next blood pressure check is due at 1400.  No new orders.  1435- Dr. James notified that patient is complaining of nausea at this time and patient would like to receive medication for nausea prior to taking Nifedipine IR.  Patient denied headache, blurred  "vision, and epigastric pain.  Patient c/o \"upset stomach\"  1505- Patient still complaining of nausea and would like to wait to take Nifedipine IR.  1600- Patient still c/o nausea and upset stomach.  Patient denied headache, blurred vision, and epigastric pain.  Patient and FOB would like to speak to the MD.  Dr. James notified that patient c/o nausea and upset stomach.  Per Dr. James, she will come talk to the patient.  1719- Per Dr. James, she placed an order for compazine to be given for nausea.  1744- Patient medicated for c/o nausea.  "

## 2023-10-21 NOTE — PROGRESS NOTES
Received report from GISELA Narayanan. Patient denies pain is receiving scheduled tylenol and motrin. Whiteboards updated, POC discussed.Patient on q 4 hr VS for high BPs. Patient denies any headaches, blurry vision and or RUQ pain. Call light within reach. Patient encouraged to call with any needs and or concerns.     0229: Dr. Goodman called in regards to patients high BP. Per Dr. Goodman RN to give 300 mg dose of labetalol at 0400. MD to modify order for patient to receive labetalol every 8 hrs verus TID.    0536: Dr. Goodman made aware of patients BP. Procardia administered see MAR.

## 2023-10-22 VITALS
RESPIRATION RATE: 18 BRPM | TEMPERATURE: 97.9 F | DIASTOLIC BLOOD PRESSURE: 105 MMHG | HEART RATE: 95 BPM | SYSTOLIC BLOOD PRESSURE: 147 MMHG | OXYGEN SATURATION: 100 % | WEIGHT: 127 LBS | HEIGHT: 62 IN | BODY MASS INDEX: 23.37 KG/M2

## 2023-10-22 LAB
ALBUMIN SERPL BCP-MCNC: 3.2 G/DL (ref 3.2–4.9)
ALBUMIN/GLOB SERPL: 1.2 G/DL
ALP SERPL-CCNC: 141 U/L (ref 30–99)
ALT SERPL-CCNC: 14 U/L (ref 2–50)
ANION GAP SERPL CALC-SCNC: 10 MMOL/L (ref 7–16)
AST SERPL-CCNC: 21 U/L (ref 12–45)
BILIRUB SERPL-MCNC: 0.3 MG/DL (ref 0.1–1.5)
BUN SERPL-MCNC: 7 MG/DL (ref 8–22)
CALCIUM ALBUM COR SERPL-MCNC: 9.4 MG/DL (ref 8.5–10.5)
CALCIUM SERPL-MCNC: 8.8 MG/DL (ref 8.5–10.5)
CHLORIDE SERPL-SCNC: 103 MMOL/L (ref 96–112)
CO2 SERPL-SCNC: 24 MMOL/L (ref 20–33)
CREAT SERPL-MCNC: 0.69 MG/DL (ref 0.5–1.4)
GFR SERPLBLD CREATININE-BSD FMLA CKD-EPI: 125 ML/MIN/1.73 M 2
GLOBULIN SER CALC-MCNC: 2.7 G/DL (ref 1.9–3.5)
GLUCOSE SERPL-MCNC: 116 MG/DL (ref 65–99)
POTASSIUM SERPL-SCNC: 3.7 MMOL/L (ref 3.6–5.5)
PROT SERPL-MCNC: 5.9 G/DL (ref 6–8.2)
SODIUM SERPL-SCNC: 137 MMOL/L (ref 135–145)

## 2023-10-22 PROCEDURE — 80053 COMPREHEN METABOLIC PANEL: CPT

## 2023-10-22 PROCEDURE — 700102 HCHG RX REV CODE 250 W/ 637 OVERRIDE(OP): Performed by: OBSTETRICS & GYNECOLOGY

## 2023-10-22 PROCEDURE — A9270 NON-COVERED ITEM OR SERVICE: HCPCS | Performed by: ADVANCED PRACTICE MIDWIFE

## 2023-10-22 PROCEDURE — 36415 COLL VENOUS BLD VENIPUNCTURE: CPT

## 2023-10-22 PROCEDURE — A9270 NON-COVERED ITEM OR SERVICE: HCPCS | Performed by: OBSTETRICS & GYNECOLOGY

## 2023-10-22 PROCEDURE — 700105 HCHG RX REV CODE 258: Performed by: ADVANCED PRACTICE MIDWIFE

## 2023-10-22 PROCEDURE — 700102 HCHG RX REV CODE 250 W/ 637 OVERRIDE(OP): Performed by: ADVANCED PRACTICE MIDWIFE

## 2023-10-22 RX ORDER — LISINOPRIL 10 MG/1
10 TABLET ORAL
Status: DISCONTINUED | OUTPATIENT
Start: 2023-10-22 | End: 2023-10-22 | Stop reason: HOSPADM

## 2023-10-22 RX ORDER — NIFEDIPINE 10 MG/1
10 CAPSULE ORAL 3 TIMES DAILY PRN
Status: DISCONTINUED | OUTPATIENT
Start: 2023-10-22 | End: 2023-10-22 | Stop reason: HOSPADM

## 2023-10-22 RX ORDER — NIFEDIPINE 30 MG/1
30 TABLET, EXTENDED RELEASE ORAL
Status: DISCONTINUED | OUTPATIENT
Start: 2023-10-22 | End: 2023-10-22 | Stop reason: HOSPADM

## 2023-10-22 RX ORDER — SIMETHICONE 125 MG
125 TABLET,CHEWABLE ORAL 3 TIMES DAILY PRN
Status: DISCONTINUED | OUTPATIENT
Start: 2023-10-22 | End: 2023-10-22 | Stop reason: HOSPADM

## 2023-10-22 RX ORDER — NIFEDIPINE 60 MG/1
60 TABLET, EXTENDED RELEASE ORAL 2 TIMES DAILY
Qty: 30 TABLET | Refills: 5 | Status: SHIPPED | OUTPATIENT
Start: 2023-10-22

## 2023-10-22 RX ORDER — CALCIUM CARBONATE 500 MG/1
500 TABLET, CHEWABLE ORAL 3 TIMES DAILY PRN
Status: DISCONTINUED | OUTPATIENT
Start: 2023-10-22 | End: 2023-10-22 | Stop reason: HOSPADM

## 2023-10-22 RX ORDER — AMLODIPINE BESYLATE 5 MG/1
5 TABLET ORAL
Status: DISCONTINUED | OUTPATIENT
Start: 2023-10-22 | End: 2023-10-22

## 2023-10-22 RX ADMIN — NIFEDIPINE 10 MG: 10 CAPSULE ORAL at 05:38

## 2023-10-22 RX ADMIN — ACETAMINOPHEN 1000 MG: 500 TABLET, FILM COATED ORAL at 12:48

## 2023-10-22 RX ADMIN — FERROUS SULFATE TAB 325 MG (65 MG ELEMENTAL FE) 325 MG: 325 (65 FE) TAB at 07:43

## 2023-10-22 RX ADMIN — ACETAMINOPHEN 1000 MG: 500 TABLET, FILM COATED ORAL at 00:49

## 2023-10-22 RX ADMIN — LISINOPRIL 10 MG: 10 TABLET ORAL at 07:13

## 2023-10-22 RX ADMIN — NIFEDIPINE 10 MG: 10 CAPSULE ORAL at 09:17

## 2023-10-22 RX ADMIN — IBUPROFEN 800 MG: 800 TABLET, FILM COATED ORAL at 05:38

## 2023-10-22 RX ADMIN — LABETALOL HYDROCHLORIDE 300 MG: 300 TABLET, FILM COATED ORAL at 05:38

## 2023-10-22 RX ADMIN — NIFEDIPINE 30 MG: 30 TABLET, FILM COATED, EXTENDED RELEASE ORAL at 09:17

## 2023-10-22 RX ADMIN — SODIUM CHLORIDE, SODIUM LACTATE, POTASSIUM CHLORIDE, CALCIUM CHLORIDE AND DEXTROSE MONOHYDRATE: 5; 600; 310; 30; 20 INJECTION, SOLUTION INTRAVENOUS at 04:10

## 2023-10-22 RX ADMIN — ACETAMINOPHEN 1000 MG: 500 TABLET, FILM COATED ORAL at 07:13

## 2023-10-22 ASSESSMENT — PAIN DESCRIPTION - PAIN TYPE
TYPE: ACUTE PAIN
TYPE: ACUTE PAIN

## 2023-10-22 NOTE — CARE PLAN
The patient is Stable - Low risk of patient condition declining or worsening    Shift Goals  Clinical Goals: VSS  Patient Goals: q3h feeds  Family Goals: bond    Progress made toward(s) clinical / shift goals:    Problem: Altered Physiologic Condition  Goal: Patient physiologically stable as evidenced by normal lochia, palpable uterine involution and vitals within normal limits  Outcome: Not Progressing     Problem: Early Mobilization - Post Surgery  Goal: Early mobilization post surgery  Outcome: Progressing   Ambulates in room and hallway.     Patient is not progressing towards the following goals:      Problem: Altered Physiologic Condition  Goal: Patient physiologically stable as evidenced by normal lochia, palpable uterine involution and vitals within normal limits  Outcome: Not Progressing   BPs out of range. Scheduled and PRN medications given. Frequent BP checks performed. MD aware.

## 2023-10-22 NOTE — PROGRESS NOTES
0700- report received from NOC RN. POC discussed including feeding intervals, I+O documentation, latch support, lochia changes, ambulation, infant temperature management including STS and layering, VS intervals, and discharge planning. Medications and other comfort measures discussed. Call light in reach. Elevated BPs overnight. Midwife aware and medications adjusted. Q4h VS in place.     0750- /115. Brandy notified. Per midwife Brandy, Pt may leave floor for fresh air and smoking session. Will repeat BP in one hour after return to floor.      0800- pt off floor via wheelchair with CNA.     0825- pt back to floor.     0915- MD Rogers notified of follow up blood pressure 157/110. New order for SR Nifedipine in MAR and discussed PRN IR order available in mar. Per MD give both medications and repeat bp in 1 hour. Last IR nifedipine given at 0530, discussed this with md; okay to continue with additional 10mg IR nifedipine administration.    1020- /105, MD rogers notified.     1300- pt expresses desire to go outside to smoke THC. RN will ask rounding MD but discussed that it was likely not an appropriate solution as her smoke break this AM did not improve her BPs.     1310- Discussed with MD. MD agrees pt should not keep leaving floor to smoke. And will come up as soon as she can which will be in about an hour.     1315- Discussed with patient. Pt and significant other became aggravated and stated they will just leave AMA. Discussed the medical necessity to keep pt in hospital due to severely elevated BPs and risk of complications including seizure and potential stroke. Pt states she has no history of seizures and we are making up stuff. RN tried to explain the risks of preeclampsia again and stated it could become life threatening. SO became very verbally agitated. Discussed that MD will be notified again to come to bedside.     1317- MD rogers called to update about pt wanting to leave AMA and that she needs to  come to bedside stat.     1318- infant cuddles removed by family which triggered security to come to unit. Cuddles replaced by RN. Security asked to stay until situation deescalated.     1330- MD paged again asking to come to bedside. MD is currently in procedure.    1345- Labor RN called to asked if MD was able to come to bedside after procedure. Labor spoke with MD who stated pt may leave AMA at this time. Relayed message to RN staff in room.     1355- pt left floor with baby and family.

## 2023-10-22 NOTE — PROGRESS NOTES
"Obstetrics & Gynecology Post-Delivery Progress Note    Date of Service      23 y.o.  s/p  for repeat and preeclampsia with severe features  Delivery date: 10/18/2023    Events  Multiple BP med changes, IV hydration (see my previous note)    Patient reporting frustration with limited changes to her blood pressure. She reports continued poor appetite. She has been chronic daily heavy marijuana smoker for at least 5 years. She is unsure if anxiety exists.     She did have visit to \"weed show\" recently where marijuana was purchased from supplier other than her regular. She is unsure if this contained any additional additive. Her partner is at bedside adamant that withdrawal is contributing to her symptoms and difficulty blood pressure management.     Subjective  Pain: No  Bleeding: lochia minimal  PO's: taking regular diet  Voiding: without difficulty  Ambulating: yes  Passing flatus: Yes  Feeding: bottlefeeding    Objective  Temp:  [36.2 °C (97.1 °F)-37.1 °C (98.7 °F)] 36.2 °C (97.1 °F)  Pulse:  [] 93  Resp:  [16-20] 18  BP: (124-175)/() 164/115  SpO2:  [100 %] 100 %    Physical Exam  General: well and resting  Chest/Breasts: nipples intact   Abdomen: nontender, normal bowel sounds  Fundus: firm and below umbilicus  Incision: dressing clean, dry, intact  Perineum: deferred  Extremities: symmetric, calves nontender    Recent Labs     10/20/23  0452   WBC 8.5   RBC 3.27*   HEMOGLOBIN 9.2*   HEMATOCRIT 28.0*   MCV 85.6   MCH 28.1   RDW 41.9   PLATELETCT 138*   MPV 11.8       Assessment/Plan  Mare Eason is a 23 y.o.yo  s/p postop day #4  s/p  for repeat    1. Post care: meeting all goals  2. Hemodynamics:  CMP this AM normal with resolved hypokalemia.  3. Pain: controlled  4. Rh+, Rubella Immune  5. Method of Feeding: plans to bottle feed  6. Method of Contraception:  not addressed  7. Disposition: likely home in 24 hours but patient may desire sooner. "       Long discussion with patient and partner regarding her chronic marijuana use and potential for withdrawal at this time contributing to her limited response to oral medications. While cessation is recommended, we discuss that likely this is masking underlying anxiety and possible depression. At this time, patient strongly desiring discharge from hospital as this is increasing her anxiety which is what she believes this is worsening her blood pressure.     Report provided to Dr. Goodman and Sy and we will increase procardia to BID dosing and D/c labetolol as this is not providing any response to patient blood pressure. Compliance would also be a concern for this patient.     Patient is high risk for stroke or seizure and this was discussed with her and emphasis put on why blood pressure control is essential. She voices understanding.    VTE prophylaxis: none indicated

## 2023-10-22 NOTE — PROGRESS NOTES
1805- Unable to obtain vital signs at this time.  Patient's significant other verbally upset, with a raised voice at the staff at the RN station and then headed back to the patient room.  Charge, RN notified, who stated she will go speak with the patient and significant other.

## 2023-10-22 NOTE — PROGRESS NOTES
Discharge Summary:     Date of Admission: 10/18/2023  Date of Discharge: 10/22/23    Admitting diagnosis:    1. Pregnancy @ 36w6d  2. Pre-eclampsia with severe features  3. Marijuana use     Discharge Diagnosis:   1. Status post vaginal, spontaneous.  2. Pre-eclampsia with severe features  3. Marijuana use     History reviewed. No pertinent past medical history.  OB History    Para Term  AB Living   3 2   2 1 2   SAB IAB Ectopic Molar Multiple Live Births   1       0 2      # Outcome Date GA Lbr Adán/2nd Weight Sex Delivery Anes PTL Lv   3  10/18/23 36w6d  2.36 kg (5 lb 3.3 oz) F CS-LTranv Spinal N GUILHERME   2 SAB 2023 6w0d             Birth Comments: Passed on its own.   1  20 36w1d  2.145 kg (4 lb 11.7 oz) F CS-LTranv EPI, Spinal Y GUILHERME     Past Surgical History:   Procedure Laterality Date    REPEAT C SECTION N/A 10/18/2023    Procedure:  SECTION, REPEAT;  Surgeon: Shaila Sahni D.O.;  Location: [facility] LABOR [facility] DELIVERY;  Service: Obstetrics    SD  DELIVERY ONLY N/A 2020    Procedure:  SECTION, PRIMARY;  Surgeon: Gianna Temple M.D.;  Location: LABOR AND DELIVERY;  Service: Obstetrics     Patient has no known allergies.    Patient Active Problem List   Diagnosis    History of     History of pre-eclampsia w/ severe features    Echogenic focus of heart of fetus affecting antepartum care of mother    Indication for care in labor or delivery       Hospital Course:   Pt is a 23 y.o. now  who presented on 10/18/2023 from clinic for elevated blood pressures in the office with headache and nausea. She was admitted for pre-eclampsia with severe features and desired repeat LTCS. Single female infant was delivered via LTCS on 10/18/2023 at 1402. Apgars 8, 9 at 1 and 5 minutes respectively.  ml. Infant's UDS came back positive for marijuana and opiates. A UDS was never obtained on this patient and she denied opiate use, but  admitted to smoking marijuana chronically for the last several years. I received a call from the nurse while I was in the OR today that this patient was wanting to leave the hospital.  We have been trying to control her blood pressures as they have been in severe range.  Her pregnancy was complicated by preeclampsia with severe features.  She was on magnesium postdelivery.  She has remained to have labile blood pressures, mostly in the severe range, and she has been treated with labetalol and procardia which have been titrated up during her hospitalization without improvement in blood pressure.  We started nifedipine on this patient today which has seemed to bring down her blood pressure a little bit. WILLIE Machado added on lisinopril 10 mg this morning.  It seems like nifedipine has been working best for her blood pressure.  She has been asymptomatic and has been denying any headache, vision changes, right upper quadrant abdominal pain, or any other concerns that would be indicative of worsening preeclampsia in the postpartum period.  Yesterday she had a small bout of vomiting that improved with Zofran and Compazine. Today, she was wanting to leave the hospital multiple times with her FOB to smoke, and security was called when they removed the cuddle band from their baby and started to escalate with nursing.     The patient elected to leave AGAINST MEDICAL ADVICE.  The repercussions of this decision have been reviewed with the patient and her family several times by nursing and by physicians including myself. They still elective to leave AMA.     I have sent the patient a prescription for Procardia XL 60mg to be taken twice daily. The patient was made aware of this and will pick it up from the pharmacy. I reviewed return precautions with her, and will contact the clinic to have them call her to schedule an outpatient appointment with CARL.     Physical Exam:  Temp:  [36.2 °C (97.1 °F)-36.8 °C (98.3 °F)] 36.6 °C (97.9  °F)  Pulse:  [88-95] 95  Resp:  [16-18] 18  BP: (124-175)/() 147/105  SpO2:  [100 %] 100 %  Physical Exam  General: well appearing, no apparent distress  Abdomen: soft, nontender, nondistended  Fundus: firm at level below umbilicus  Incision: dressing clean, dry, intact, healing well, no dehiscence, and no significant erythema  Perineum: Deferred  Extremities: symmetric, no peripheral edema, calves nontender    Current Facility-Administered Medications   Medication Dose    NIFEdipine IR (Procardia) capsule 10 mg  10 mg    lisinopril (Prinivil) tablet 10 mg  10 mg    NIFEdipine SR (Procadia-XL) tablet 30 mg  30 mg    simethicone (Mylicon) chewable tablet 125 mg  125 mg    calcium carbonate (Tums) chewable tab 500 mg  500 mg    D5LR infusion      ferrous sulfate tablet 325 mg  325 mg    NIFEdipine SR (Procadia-XL) tablet 60 mg  60 mg    lactated ringers infusion      ibuprofen (Motrin) tablet 800 mg  800 mg    acetaminophen (Tylenol) tablet 1,000 mg  1,000 mg    Followed by    acetaminophen (Tylenol) tablet 1,000 mg  1,000 mg    oxyCODONE immediate-release (Roxicodone) tablet 5 mg  5 mg    oxyCODONE immediate release (Roxicodone) tablet 10 mg  10 mg    ondansetron (Zofran) syringe/vial injection 4 mg  4 mg    Or    ondansetron (Zofran ODT) dispertab 4 mg  4 mg    diphenhydrAMINE (Benadryl) tablet/capsule 25 mg  25 mg    Or    diphenhydrAMINE (Benadryl) injection 25 mg  25 mg    docusate sodium (Colace) capsule 100 mg  100 mg    hydrALAZINE (Apresoline) injection 10 mg  10 mg    oxytocin (Pitocin) injection 10 Units  10 Units    carboPROST (Hemabate) injection 250 mcg  250 mcg     Current Outpatient Medications   Medication    NIFEdipine SR (PROCARDIA XL) 60 MG CR tablet       Recent Labs     10/20/23  0452   WBC 8.5   RBC 3.27*   HEMOGLOBIN 9.2*   HEMATOCRIT 28.0*   MCV 85.6   MCH 28.1   MCHC 32.9   RDW 41.9   PLATELETCT 138*   MPV 11.8       Activity/ Discharge Instructions::   Discharge to home  Pelvic Rest x  6 weeks  No heavy lifting x4 weeks  Call or come to ED for: heavy vaginal bleeding, fever >100.4, severe abdominal pain, severe headache, chest pain, shortness of breath,  N/V, incisional drainage, or other concerns.  Procardia 60mg BID sent to pharmacy, patient aware     Follow up:  1 week for incision check for  delivery, will schedule with MFM for additional f/u      Discharge Meds:   Current Outpatient Medications   Medication Sig Dispense Refill    NIFEdipine SR (PROCARDIA XL) 60 MG CR tablet Take 1 Tablet by mouth 2 times a day. 30 Tablet 5       Tejal James D.O.  PGY-1, [facility]

## 2023-10-22 NOTE — PROGRESS NOTES
Called WILLIE Nava and reported patient's current BP of 175/122 and that BP and pain meds just given. No orders for now, states she will consult with attending and call back.

## 2023-10-22 NOTE — PROGRESS NOTES
Obstetrics & Gynecology Post-Delivery Progress Note    Date of Service      23 y.o.  s/p  for repeat  Delivery date: 10/19/2023    Events  RN called me indicating that patient blood pressure is elevated. She has been dealing with nausea most of the day and not able to take oral antihypertensive medication. Patient reports she has not had any water today. She just took one bite of her hamburger. Sprite and crackers were provided but she only had one cracker. She reports that her stomach is very upset. She is burping and passing gas at current. During pregnancy she was taking zofran most days and UDS shows positive marijuana use.  Earlier in the day, she was provided lasix related to elevated blood pressure. She has voided only once since Lasix administration. Denies presence of headache or shortness of breath. Partner is at bedside with multiple questions about discharge. He is asking how to get patient's blood pressure down outside of medication.      Subjective  Pain: No  Bleeding: lochia minimal  PO's:  limited PO intake. No water today. No appetite.   Voiding: without difficulty  Ambulating: yes  Passing flatus: No  Feeding: bottlefeeding    Objective  Temp:  [36.2 °C (97.2 °F)-37.1 °C (98.7 °F)] 36.8 °C (98.3 °F)  Pulse:  [] 88  Resp:  [16-20] 18  BP: (136-170)/() 157/111  SpO2:  [96 %-100 %] 100 %    Physical Exam  General: resting and fatigue  Chest/Breasts: nipples intact   Abdomen: normal bowel sounds, soft  Fundus: firm and below umbilicus  Incision: dressing clean, dry, intact  Perineum: well healing  Extremities: symmetric, calves nontender    Recent Labs     10/19/23  0019 10/20/23  0452   WBC 10.2 8.5   RBC 3.84* 3.27*   HEMOGLOBIN 11.5* 9.2*   HEMATOCRIT 34.2* 28.0*   MCV 89.1 85.6   MCH 29.9 28.1   RDW 43.9 41.9   PLATELETCT 156* 138*   MPV 12.0 11.8   NEUTSPOLYS 77.60*  --    LYMPHOCYTES 16.10*  --    MONOCYTES 5.40  --    EOSINOPHILS 0.10  --    BASOPHILS 0.40  --         Assessment/Plan  Mare Eason is a 23 y.o  s/p postop day #2  s/p  for repeat    1. Post care: meeting all goals  2. Hemodynamics:  start ferrous sulfate daily  3. Pain: controlled  4. Method of Feeding: plans to bottle feed  5. Preeclampsia with severe features- concern at this time for dehydration vs withdrawal. Will gently hydrate patient overnight to see if this will provide positive BP response. Increase procardia to 60mg daily. Will monitor Bps overnight. Lasix and potassium discontinued. Encouraged PO intake by patient and may have zofran PRN.

## 2023-10-22 NOTE — CARE PLAN
The patient is Stable - Low risk of patient condition declining or worsening    Shift Goals  Clinical Goals: VSS,light rubra,incision dressing clean,dry and intact  Patient Goals: free from nausea.rest  Family Goals: rest,go home    Progress made toward(s) clinical / shift goals:  progressing after start of infusing IV fluids and BP meds    Patient is not progressing towards the following goals:

## 2023-10-22 NOTE — DISCHARGE SUMMARY
Discharge Summary:     Date of Admission: 10/18/2023  Date of Discharge: 10/22/23     Admitting diagnosis:     1. Pregnancy @ 36w6d  2. Pre-eclampsia with severe features  3. Marijuana use      Discharge Diagnosis:   1. Status post vaginal, spontaneous.  2. Pre-eclampsia with severe features  3. Marijuana use     History reviewed. No pertinent past medical history.  OB History    Para Term  AB Living   3 2   2 1 2   SAB IAB Ectopic Molar Multiple Live Births   1       0 2      # Outcome Date GA Lbr Adán/2nd Weight Sex Delivery Anes PTL Lv   3  10/18/23 36w6d  2.36 kg (5 lb 3.3 oz) F CS-LTranv Spinal N GUILHERME   2 SAB 2023 6w0d             Birth Comments: Passed on its own.   1  20 36w1d  2.145 kg (4 lb 11.7 oz) F CS-LTranv EPI, Spinal Y GUILHERME     Past Surgical History:   Procedure Laterality Date    REPEAT C SECTION N/A 10/18/2023    Procedure:  SECTION, REPEAT;  Surgeon: Shaila Sahni D.O.;  Location: SURGERY LABOR [facility] DELIVERY;  Service: Obstetrics    AR  DELIVERY ONLY N/A 2020    Procedure:  SECTION, PRIMARY;  Surgeon: Gianna Temple M.D.;  Location: LABOR AND DELIVERY;  Service: Obstetrics     Patient has no known allergies.    Patient Active Problem List   Diagnosis    History of     History of pre-eclampsia w/ severe features    Echogenic focus of heart of fetus affecting antepartum care of mother    Indication for care in labor or delivery       Hospital Course:   Pt is a 23 y.o. now  who presented on 10/18/2023 from clinic for elevated blood pressures in the office with headache and nausea. She was admitted for pre-eclampsia with severe features and desired repeat LTCS. Single female infant was delivered via LTCS on 10/18/2023 at 1402. Apgars 8, 9 at 1 and 5 minutes respectively.  ml. Infant's UDS came back positive for marijuana and opiates. A UDS was never obtained on this patient and she denied opiate use, but  admitted to smoking marijuana chronically for the last several years. I received a call from the nurse while I was in the OR today that this patient was wanting to leave the hospital.  We have been trying to control her blood pressures as they have been in severe range.  Her pregnancy was complicated by preeclampsia with severe features.  She was on magnesium postdelivery.  She has remained to have labile blood pressures, mostly in the severe range, and she has been treated with labetalol and procardia which have been titrated up during her hospitalization without improvement in blood pressure.  We started nifedipine on this patient today which has seemed to bring down her blood pressure a little bit. WILLIE Machado added on lisinopril 10 mg this morning.  It seems like nifedipine has been working best for her blood pressure.  She has been asymptomatic and has been denying any headache, vision changes, right upper quadrant abdominal pain, or any other concerns that would be indicative of worsening preeclampsia in the postpartum period.  Yesterday she had a small bout of vomiting that improved with Zofran and Compazine. Today, she was wanting to leave the hospital multiple times with her FOB to smoke, and security was called when they removed the cuddle band from their baby and started to escalate with nursing.      The patient elected to leave AGAINST MEDICAL ADVICE.  The repercussions of this decision have been reviewed with the patient and her family several times by nursing and by physicians including myself. They still elective to leave AMA.      I have sent the patient a prescription for Procardia XL 60mg to be taken twice daily. The patient was made aware of this and will pick it up from the pharmacy. I reviewed return precautions with her, and will contact the clinic to have them call her to schedule an outpatient appointment with CARL.     Physical Exam:  Temp:  [36.2 °C (97.1 °F)-36.8 °C (98.3 °F)] 36.6 °C  (97.9 °F)  Pulse:  [88-95] 95  Resp:  [16-18] 18  BP: (124-175)/() 147/105  SpO2:  [100 %] 100 %  Physical Exam  General: well appearing, no apparent distress  Abdomen: soft, nontender, nondistended  Fundus: firm at level below umbilicus  Incision: dressing clean, dry, intact, healing well, no significant drainage, and no significant erythema  Perineum: Deferred  Extremities: symmetric, no peripheral edema, calves nontender    Current Facility-Administered Medications   Medication Dose    NIFEdipine IR (Procardia) capsule 10 mg  10 mg    lisinopril (Prinivil) tablet 10 mg  10 mg    NIFEdipine SR (Procadia-XL) tablet 30 mg  30 mg    simethicone (Mylicon) chewable tablet 125 mg  125 mg    calcium carbonate (Tums) chewable tab 500 mg  500 mg    D5LR infusion      ferrous sulfate tablet 325 mg  325 mg    NIFEdipine SR (Procadia-XL) tablet 60 mg  60 mg    lactated ringers infusion      ibuprofen (Motrin) tablet 800 mg  800 mg    acetaminophen (Tylenol) tablet 1,000 mg  1,000 mg    Followed by    acetaminophen (Tylenol) tablet 1,000 mg  1,000 mg    oxyCODONE immediate-release (Roxicodone) tablet 5 mg  5 mg    oxyCODONE immediate release (Roxicodone) tablet 10 mg  10 mg    ondansetron (Zofran) syringe/vial injection 4 mg  4 mg    Or    ondansetron (Zofran ODT) dispertab 4 mg  4 mg    diphenhydrAMINE (Benadryl) tablet/capsule 25 mg  25 mg    Or    diphenhydrAMINE (Benadryl) injection 25 mg  25 mg    docusate sodium (Colace) capsule 100 mg  100 mg    hydrALAZINE (Apresoline) injection 10 mg  10 mg    oxytocin (Pitocin) injection 10 Units  10 Units    carboPROST (Hemabate) injection 250 mcg  250 mcg     Current Outpatient Medications   Medication    NIFEdipine SR (PROCARDIA XL) 60 MG CR tablet       Recent Labs     10/20/23  0452   WBC 8.5   RBC 3.27*   HEMOGLOBIN 9.2*   HEMATOCRIT 28.0*   MCV 85.6   MCH 28.1   MCHC 32.9   RDW 41.9   PLATELETCT 138*   MPV 11.8       Activity/ Discharge Instructions::   Discharge to  home  Pelvic Rest x 6 weeks  No heavy lifting x4 weeks  Call or come to ED for: heavy vaginal bleeding, fever >100.4, severe abdominal pain, severe headache, chest pain, shortness of breath,  N/V, incisional drainage, or other concerns.  Procardia 60mg BID sent to pharmacy, patient aware       Follow up:  1 week for incision check for  delivery, will schedule with MFM for additional f/u      Discharge Meds:   Current Outpatient Medications   Medication Sig Dispense Refill    NIFEdipine SR (PROCARDIA XL) 60 MG CR tablet Take 1 Tablet by mouth 2 times a day. 30 Tablet 5       Tejal James D.O.  PGY-1, [facility]

## 2023-10-22 NOTE — PROGRESS NOTES
Discharged AMA. Instructions given on infant care and safety , self care, and reasons to call the Dr.  States she is aware of how she feels when her b/p is high . Above 140/90 .states she will seek a medical dr if b/p greater than 140/90. States she will buy an over the counter bp machine

## 2023-10-23 DIAGNOSIS — Z98.891 HISTORY OF C-SECTION: ICD-10-CM

## 2023-10-24 LAB — GP B STREP DNA SPEC QL NAA+PROBE: NEGATIVE

## 2023-12-19 ENCOUNTER — POST PARTUM (OUTPATIENT)
Dept: OBGYN | Facility: CLINIC | Age: 23
End: 2023-12-19
Payer: COMMERCIAL

## 2023-12-19 VITALS — SYSTOLIC BLOOD PRESSURE: 180 MMHG | WEIGHT: 115 LBS | DIASTOLIC BLOOD PRESSURE: 120 MMHG | BODY MASS INDEX: 21.03 KG/M2

## 2023-12-19 PROCEDURE — 3077F SYST BP >= 140 MM HG: CPT | Performed by: OBSTETRICS & GYNECOLOGY

## 2023-12-19 PROCEDURE — 3080F DIAST BP >= 90 MM HG: CPT | Performed by: OBSTETRICS & GYNECOLOGY

## 2023-12-19 PROCEDURE — 0503F POSTPARTUM CARE VISIT: CPT | Performed by: OBSTETRICS & GYNECOLOGY

## 2023-12-19 ASSESSMENT — FIBROSIS 4 INDEX: FIB4 SCORE: .935414346693485346

## 2023-12-19 NOTE — PROGRESS NOTES
"POST PARTUM VISIT NOTE    SUBJECTIVE:  Mare Keren Eason is a 23 y.o.  who presents for postpartum exam.   I have reviewed the prenatal and intrapartum course. The patient's postpartum course was complicated by uncontrolled high blood pressure. Today she presents to clinic with severely elevated blood pressures (180/110 and 180/120). She had been prescribed nifedepine 60mg XL but did not take it because when they went to fill the prescription they were told if was an opioid. The partner states that he did additional research and per his research is certain Nifedepine is an opioid. I had a long discussion that nifedipine is a calcium channel blocker and not an opioid. The drug information on UpToDate was pulled up and the partner was shown this, but continues to maintain that [facility] is trying to drug\" his partner and his baby. We discussed that there are other medications that can be prescribed, but with her current blood pressure I recommend presenting to the emergency department immediately.     Date of delivery :  10/18/2023  Type of delivery:  section      ROS:  She feels well healed.   Incision is well healed.   Bleeding lasted about 4-6 weeks.  She is getting along well with her partner.   She reports her mood is good. Denies postpartum blues.   She has not had sex.   Denies urinary or stool incontinence.    Last pap date: Due for one today but declines     Delivery laceration(s): no  Feeding: Bottle  Postpartum course: uncomplicated  Contraceptive plans: Nexplanon (not placed today due to severe range pressures and recommendation to present to the ED immediately)    I have reviewed the patient's PMH for contraceptive risk factors, and she has no contraindications to contraception as planned.     OBJECTIVE:    Vitals:    23 0821   BP: (!) 180/120       Appears well, hypertensive urgency   Breast Exam: exam deferred.  Abdomen: Deferred  Pelvic Exam: Deferred    EPDS score:  " 3    ASSESSMENT:  Hypertensive urgency     PLAN:  Patient counseled on hypertension and risk of heart attack, stroke, end organ damage, and death  She was counseled to present to the ED immediately   She was asked to schedule in 2 weeks for pap smear and LARC placement  Education about nifedipine was provided to her and her partner, they are adamant that they don't want to take that medication  I recommended a referral to IM after ED visit, they don't want to be within the [facility] system so they were advised that there are other groups in town that could see them after the immediate hypertensive urgency situation is resolved     Cornelia Guerra M.D.

## 2023-12-19 NOTE — PROGRESS NOTES
Pt here today for postpartum exam.  Delivery type: C/s was on 10/18/2023   Currently :bottle feeding  Desired BCM: interested in Nexplanon   LMP: 12/01/2023  Last pap: 1st today  Phone # [phone] (home)

## 2024-01-04 ENCOUNTER — GYNECOLOGY VISIT (OUTPATIENT)
Dept: OBGYN | Facility: CLINIC | Age: 24
End: 2024-01-04
Payer: COMMERCIAL

## 2024-01-04 DIAGNOSIS — Z30.017 NEXPLANON INSERTION: ICD-10-CM

## 2024-01-04 DIAGNOSIS — Z30.8 ENCOUNTER FOR OTHER CONTRACEPTIVE MANAGEMENT: ICD-10-CM

## 2024-01-04 PROBLEM — O35.BXX0 ECHOGENIC FOCUS OF HEART OF FETUS AFFECTING ANTEPARTUM CARE OF MOTHER: Status: RESOLVED | Noted: 2023-07-24 | Resolved: 2024-01-04

## 2024-01-04 PROCEDURE — 99999 PR NO CHARGE: CPT | Performed by: NURSE PRACTITIONER

## 2024-01-04 NOTE — PROGRESS NOTES
Patient here for Nexplanon insertion  Patient states she had unprotected sex 3 days ago. Per provider we are not yazmin to do nexplanon.  Patient refuse to have pap today  Instructions given to patient to avoid sexual intercourse for 2 weeks prior to insertion or protected sex using condoms

## 2024-01-04 NOTE — PROGRESS NOTES
Pt here for Nexplanon placement however, she had unprotected sex 2 days ago. Unable to place Nexplanon today. Pt needs to rescedule appt in 2 wks.

## 2024-01-15 PROBLEM — O36.5990 IUGR (INTRAUTERINE GROWTH RESTRICTION) AFFECTING CARE OF MOTHER: Status: ACTIVE | Noted: 2024-01-15

## 2024-01-18 ENCOUNTER — GYNECOLOGY VISIT (OUTPATIENT)
Dept: OBGYN | Facility: CLINIC | Age: 24
End: 2024-01-18
Payer: COMMERCIAL

## 2024-01-18 VITALS — SYSTOLIC BLOOD PRESSURE: 140 MMHG | BODY MASS INDEX: 20.85 KG/M2 | DIASTOLIC BLOOD PRESSURE: 90 MMHG | WEIGHT: 114 LBS

## 2024-01-18 DIAGNOSIS — Z32.02 PREGNANCY TEST NEGATIVE: ICD-10-CM

## 2024-01-18 DIAGNOSIS — Z30.017 NEXPLANON INSERTION: Primary | ICD-10-CM

## 2024-01-18 PROBLEM — Z30.8 ENCOUNTER FOR OTHER CONTRACEPTIVE MANAGEMENT: Status: RESOLVED | Noted: 2024-01-04 | Resolved: 2024-01-18

## 2024-01-18 PROBLEM — O36.5990 IUGR (INTRAUTERINE GROWTH RESTRICTION) AFFECTING CARE OF MOTHER: Status: RESOLVED | Noted: 2024-01-15 | Resolved: 2024-01-18

## 2024-01-18 LAB
POCT INT CON NEG: NEGATIVE
POCT INT CON POS: POSITIVE
POCT URINE PREGNANCY TEST: NEGATIVE

## 2024-01-18 PROCEDURE — 3077F SYST BP >= 140 MM HG: CPT | Performed by: NURSE PRACTITIONER

## 2024-01-18 PROCEDURE — 11981 INSERTION DRUG DLVR IMPLANT: CPT | Performed by: NURSE PRACTITIONER

## 2024-01-18 PROCEDURE — 81025 URINE PREGNANCY TEST: CPT | Performed by: NURSE PRACTITIONER

## 2024-01-18 PROCEDURE — 3080F DIAST BP >= 90 MM HG: CPT | Performed by: NURSE PRACTITIONER

## 2024-01-18 ASSESSMENT — ENCOUNTER SYMPTOMS
MUSCULOSKELETAL NEGATIVE: 1
EYES NEGATIVE: 1
PSYCHIATRIC NEGATIVE: 1
NEUROLOGICAL NEGATIVE: 1
CARDIOVASCULAR NEGATIVE: 1
GASTROINTESTINAL NEGATIVE: 1
RESPIRATORY NEGATIVE: 1
CONSTITUTIONAL NEGATIVE: 1

## 2024-01-18 ASSESSMENT — FIBROSIS 4 INDEX: FIB4 SCORE: .935414346693485346

## 2024-01-18 NOTE — PROGRESS NOTES
Pt here for nexplanon insertion and pap  Pt states she is declining the pap due to she doesn't like how it feels  Good # [phone] (home)    Pharmacy verified.   UPT neg

## 2024-01-19 NOTE — PROCEDURES
General Procedure    Date/Time: 1/18/2024 4:19 PM    Performed by: Angeles Negro C.N.M.  Authorized by: Angeles Negro C.N.M.  Preparation: Patient was prepped and draped in the usual sterile fashion.  Local anesthesia used: yes  Anesthesia: local infiltration    Anesthesia:  Local anesthesia used: yes  Local Anesthetic: lidocaine 1% without epinephrine  Anesthetic total: 3 mL    Sedation:  Patient sedated: no    Patient tolerance: patient tolerated the procedure well with no immediate complications          Procedure note; Nexplanon insertion;    Informed consent obtained, consent signed-discussed the risks of Nexplanon; pain, bleeding, infection, bruising, possible pregnancy, difficulty with removing implant, possible scarring to arm.   All the risks and benefits of the procedure and the device are explained and patient verbalizes understanding and signs the consent     Urine hCG negative    Patient positioned supine with nondominant arm exposed.    Medial epicondyle of left arm palpated    Surgical shaka placed 8-10 cm medial to the medial epicondyle    Betadine prep the skin    Local anesthesia-1% lidocaine injected using a 1 1/2 inch 27 gauge needle     Implant inserted via the Nexplanon insertion device subdermally in a sterile fashion    The patient and provider can feel the implant under the skin and the blue end of the insertion device is present indicating implant insertion  Steri strips followed by sterile 4x4's     Pressure bandage placed    Patient instructed to remove dressing  in 24 hours    Patient instructed to use a band-aid for 2 days there after    Patient tolerated the procedure well    Followup in 4-6  weeks for post insertion checkup     Lot: [id number]  Exp: 11/2025    Angeles Negro CNM, MJ

## 2024-01-19 NOTE — PROGRESS NOTES
Subjective     Mare Keren Eason is a 23 y.o. female who presents for Nexplanon placement    She is a  with hx of  x 2. She has a history of severe pre-eclampsia with both deliveries. Was on Labetalol, but decided to stop it. Education provided on HTN and risks for chronic hypertension and co-morbidities. Will follow up with PCP    Her LMP was 2024. UPT today is negative  She has used Nexplanon in the past, and desires another today.    All risks, benefits and potential side effects of the Nexplanon are discussed at length  Risks to include: Implant migrating, implant breaking in the arm, infection at the insertion/removal site, difficult removal of the device.  Side effects: irregular unpredictable bleeding, acne, mood changes, weight changes, headaches, dizziness  Benefits: most women (about 70%) have very little or no bleeding on the implant, decrease in cramping, strong reliable birth control at 99.6 % effective, private, totally reversible long term birth control method of 3 years     Reviewed insertion procedure, all questions answered. Desires to proceed. See separate procedure note    She is due for pap smear, but she declines today. Will schedule later.    HPI    Review of Systems   Constitutional: Negative.    HENT: Negative.     Eyes: Negative.    Respiratory: Negative.     Cardiovascular: Negative.    Gastrointestinal: Negative.    Genitourinary: Negative.    Musculoskeletal: Negative.    Skin: Negative.    Neurological: Negative.    Endo/Heme/Allergies: Negative.    Psychiatric/Behavioral: Negative.     All other systems reviewed and are negative.    Objective     BP (!) 140/90 (BP Location: Right arm, Patient Position: Sitting)   Wt 114 lb   LMP 2024   BMI 20.85 kg/m²      Physical Exam  Vitals and nursing note reviewed. Exam conducted with a chaperone present.   Constitutional:       Appearance: Normal appearance. She is normal weight.   HENT:      Head:  Normocephalic.      Nose: Nose normal.   Eyes:      Extraocular Movements: Extraocular movements intact.   Cardiovascular:      Rate and Rhythm: Normal rate and regular rhythm.      Pulses: Normal pulses.      Heart sounds: Normal heart sounds.   Pulmonary:      Effort: Pulmonary effort is normal.      Breath sounds: Normal breath sounds.   Abdominal:      Palpations: Abdomen is soft.   Musculoskeletal:         General: Normal range of motion.      Cervical back: Normal range of motion.   Skin:     General: Skin is warm and dry.      Capillary Refill: Capillary refill takes less than 2 seconds.   Neurological:      General: No focal deficit present.      Mental Status: She is alert and oriented to person, place, and time.   Psychiatric:         Mood and Affect: Mood normal.         Behavior: Behavior normal.         Thought Content: Thought content normal.         Judgment: Judgment normal.       Assessment & Plan       1. Nexplanon insertion  Inserted in upper left arm without concerns  - Consent for all Surgical, Special Diagnostic or Therapeutic Procedures  - POCT Pregnancy  - etonogestrel (Nexplanon) implant 1 Each    2. Pregnancy test negative  LMP 1/14/2024

## 2024-02-15 ENCOUNTER — GYNECOLOGY VISIT (OUTPATIENT)
Dept: OBGYN | Facility: CLINIC | Age: 24
End: 2024-02-15
Payer: COMMERCIAL

## 2024-02-15 VITALS — DIASTOLIC BLOOD PRESSURE: 72 MMHG | SYSTOLIC BLOOD PRESSURE: 128 MMHG

## 2024-02-15 DIAGNOSIS — Z97.5 NEXPLANON IN PLACE: Primary | ICD-10-CM

## 2024-02-15 PROCEDURE — 3074F SYST BP LT 130 MM HG: CPT | Performed by: NURSE PRACTITIONER

## 2024-02-15 PROCEDURE — 99212 OFFICE O/P EST SF 10 MIN: CPT | Performed by: NURSE PRACTITIONER

## 2024-02-15 PROCEDURE — 3078F DIAST BP <80 MM HG: CPT | Performed by: NURSE PRACTITIONER

## 2024-02-15 ASSESSMENT — ENCOUNTER SYMPTOMS
CARDIOVASCULAR NEGATIVE: 1
NEUROLOGICAL NEGATIVE: 1
RESPIRATORY NEGATIVE: 1
PSYCHIATRIC NEGATIVE: 1
EYES NEGATIVE: 1
CONSTITUTIONAL NEGATIVE: 1
MUSCULOSKELETAL NEGATIVE: 1
GASTROINTESTINAL NEGATIVE: 1

## 2024-02-15 NOTE — PROGRESS NOTES
Subjective     Mare Eason is a 23 y.o. female who presents for Nexplanon check.  She is a  with hx of c/s x 2 and SAB x 1. No plans now for more children.  She had her PP visit and then had a separate visit 2024 for Nexplanon placement. States she started bleeding  and bled until , and since has had some on and off spotting. Discussed normalcy of this. She is encouraged to return if after 8 weeks she is still bleeding and it is problematic. Reviewed possible treatment options.   Denies any concerns. No HA or heavy bleeding. Does notice some hormonal symptoms and mood swings, but can't really verbalize, just feels different.  She is happy with this and feels like her body is adjusting.    Pap is due- she declines. Education provided on importance of screening and that paps can  abnormalities causing cancer before any symptoms are noted. Will schedule when ready    Aware of 3 year efficacy of Nexplanon and need to have device removed/replaced in 3 years or sooner for complications or desired fertility    HPI    Review of Systems   Constitutional: Negative.    HENT: Negative.     Eyes: Negative.    Respiratory: Negative.     Cardiovascular: Negative.    Gastrointestinal: Negative.    Genitourinary: Negative.    Musculoskeletal: Negative.    Skin: Negative.    Neurological: Negative.    Endo/Heme/Allergies: Negative.    Psychiatric/Behavioral: Negative.     All other systems reviewed and are negative.      Objective     /72 (BP Location: Right arm, Patient Position: Sitting, BP Cuff Size: Adult)   LMP 2024 (Exact Date)      Physical Exam  Vitals and nursing note reviewed. Exam conducted with a chaperone present.   Constitutional:       Appearance: Normal appearance. She is normal weight.      Comments: Nexplanon palpable in upper left arm   HENT:      Head: Normocephalic.      Nose: Nose normal.   Eyes:      Extraocular Movements: Extraocular movements intact.    Cardiovascular:      Rate and Rhythm: Normal rate and regular rhythm.      Pulses: Normal pulses.      Heart sounds: Normal heart sounds.   Pulmonary:      Effort: Pulmonary effort is normal.      Breath sounds: Normal breath sounds.   Abdominal:      Palpations: Abdomen is soft.   Genitourinary:     General: Normal vulva.      Rectum: Normal.   Musculoskeletal:         General: Normal range of motion.      Cervical back: Normal range of motion.   Skin:     General: Skin is warm and dry.      Capillary Refill: Capillary refill takes less than 2 seconds.   Neurological:      General: No focal deficit present.      Mental Status: She is alert and oriented to person, place, and time.   Psychiatric:         Mood and Affect: Mood normal.         Behavior: Behavior normal.         Thought Content: Thought content normal.         Judgment: Judgment normal.       Assessment & Plan       1. Nexplanon in place  Palpable in upper left arm- no concerns

## 2024-02-15 NOTE — PROGRESS NOTES
PT HERE FOR GYN APPT  CC  nexplanon check (questions regarding bleeding/spotting  PHONE # VERIFIED  PHARMACY VERIFIED

## 2025-02-21 ENCOUNTER — APPOINTMENT (OUTPATIENT)
Dept: URGENT CARE | Facility: CLINIC | Age: 25
End: 2025-02-21
Payer: COMMERCIAL

## 2025-04-17 ENCOUNTER — APPOINTMENT (OUTPATIENT)
Dept: OBGYN | Facility: CLINIC | Age: 25
End: 2025-04-17
Payer: COMMERCIAL

## 2025-05-20 ENCOUNTER — HOSPITAL ENCOUNTER (OUTPATIENT)
Dept: RADIOLOGY | Facility: MEDICAL CENTER | Age: 25
End: 2025-05-20

## 2025-05-20 ENCOUNTER — HOSPITAL ENCOUNTER (INPATIENT)
Facility: MEDICAL CENTER | Age: 25
LOS: 5 days | DRG: 137 | End: 2025-05-25
Attending: EMERGENCY MEDICINE | Admitting: STUDENT IN AN ORGANIZED HEALTH CARE EDUCATION/TRAINING PROGRAM
Payer: MEDICAID

## 2025-05-20 DIAGNOSIS — A41.9 SEPSIS, DUE TO UNSPECIFIED ORGANISM, UNSPECIFIED WHETHER ACUTE ORGAN DYSFUNCTION PRESENT (HCC): Primary | ICD-10-CM

## 2025-05-20 DIAGNOSIS — K11.20 SIALADENITIS: ICD-10-CM

## 2025-05-20 DIAGNOSIS — I10 PRIMARY HYPERTENSION: ICD-10-CM

## 2025-05-20 DIAGNOSIS — K12.2 LUDWIG ANGINA: ICD-10-CM

## 2025-05-20 PROBLEM — E83.51 HYPOCALCEMIA: Status: ACTIVE | Noted: 2025-05-20

## 2025-05-20 PROBLEM — E87.20 METABOLIC ACIDOSIS: Status: ACTIVE | Noted: 2025-05-20

## 2025-05-20 LAB
ALBUMIN SERPL BCP-MCNC: 3.9 G/DL (ref 3.2–4.9)
ALBUMIN/GLOB SERPL: 1.1 G/DL
ALP SERPL-CCNC: 113 U/L (ref 30–99)
ALT SERPL-CCNC: 17 U/L (ref 2–50)
ANION GAP SERPL CALC-SCNC: 15 MMOL/L (ref 7–16)
AST SERPL-CCNC: 25 U/L (ref 12–45)
BASOPHILS # BLD AUTO: 0 % (ref 0–1.8)
BASOPHILS # BLD: 0 K/UL (ref 0–0.12)
BILIRUB SERPL-MCNC: 0.4 MG/DL (ref 0.1–1.5)
BUN SERPL-MCNC: 7 MG/DL (ref 8–22)
CALCIUM ALBUM COR SERPL-MCNC: 8.4 MG/DL (ref 8.5–10.5)
CALCIUM SERPL-MCNC: 8.3 MG/DL (ref 8.5–10.5)
CHLORIDE SERPL-SCNC: 108 MMOL/L (ref 96–112)
CO2 SERPL-SCNC: 19 MMOL/L (ref 20–33)
CREAT SERPL-MCNC: 0.78 MG/DL (ref 0.5–1.4)
EOSINOPHIL # BLD AUTO: 0 K/UL (ref 0–0.51)
EOSINOPHIL NFR BLD: 0 % (ref 0–6.9)
ERYTHROCYTE [DISTWIDTH] IN BLOOD BY AUTOMATED COUNT: 46.5 FL (ref 35.9–50)
GFR SERPLBLD CREATININE-BSD FMLA CKD-EPI: 108 ML/MIN/1.73 M 2
GLOBULIN SER CALC-MCNC: 3.7 G/DL (ref 1.9–3.5)
GLUCOSE SERPL-MCNC: 126 MG/DL (ref 65–99)
HCT VFR BLD AUTO: 37.4 % (ref 37–47)
HGB BLD-MCNC: 12.1 G/DL (ref 12–16)
LACTATE SERPL-SCNC: 1.4 MMOL/L (ref 0.5–2)
LYMPHOCYTES # BLD AUTO: 0.57 K/UL (ref 1–4.8)
LYMPHOCYTES NFR BLD: 2.6 % (ref 22–41)
MANUAL DIFF BLD: NORMAL
MCH RBC QN AUTO: 28.4 PG (ref 27–33)
MCHC RBC AUTO-ENTMCNC: 32.4 G/DL (ref 32.2–35.5)
MCV RBC AUTO: 87.8 FL (ref 81.4–97.8)
MONOCYTES # BLD AUTO: 0.2 K/UL (ref 0–0.85)
MONOCYTES NFR BLD AUTO: 0.9 % (ref 0–13.4)
MORPHOLOGY BLD-IMP: NORMAL
NEUTROPHILS # BLD AUTO: 21.04 K/UL (ref 1.82–7.42)
NEUTROPHILS NFR BLD: 96.5 % (ref 44–72)
NRBC # BLD AUTO: 0 K/UL
NRBC BLD-RTO: 0 /100 WBC (ref 0–0.2)
PLATELET # BLD AUTO: 192 K/UL (ref 164–446)
PLATELET BLD QL SMEAR: NORMAL
PMV BLD AUTO: 11.3 FL (ref 9–12.9)
POTASSIUM SERPL-SCNC: 3.6 MMOL/L (ref 3.6–5.5)
PROT SERPL-MCNC: 7.6 G/DL (ref 6–8.2)
RBC # BLD AUTO: 4.26 M/UL (ref 4.2–5.4)
RBC BLD AUTO: NORMAL
SODIUM SERPL-SCNC: 142 MMOL/L (ref 135–145)
WBC # BLD AUTO: 21.8 K/UL (ref 4.8–10.8)

## 2025-05-20 PROCEDURE — 700102 HCHG RX REV CODE 250 W/ 637 OVERRIDE(OP): Performed by: STUDENT IN AN ORGANIZED HEALTH CARE EDUCATION/TRAINING PROGRAM

## 2025-05-20 PROCEDURE — 80053 COMPREHEN METABOLIC PANEL: CPT

## 2025-05-20 PROCEDURE — 700105 HCHG RX REV CODE 258: Performed by: STUDENT IN AN ORGANIZED HEALTH CARE EDUCATION/TRAINING PROGRAM

## 2025-05-20 PROCEDURE — 87040 BLOOD CULTURE FOR BACTERIA: CPT

## 2025-05-20 PROCEDURE — 770006 HCHG ROOM/CARE - MED/SURG/GYN SEMI*

## 2025-05-20 PROCEDURE — 36415 COLL VENOUS BLD VENIPUNCTURE: CPT

## 2025-05-20 PROCEDURE — 99285 EMERGENCY DEPT VISIT HI MDM: CPT

## 2025-05-20 PROCEDURE — 99223 1ST HOSP IP/OBS HIGH 75: CPT | Mod: AI | Performed by: STUDENT IN AN ORGANIZED HEALTH CARE EDUCATION/TRAINING PROGRAM

## 2025-05-20 PROCEDURE — 85027 COMPLETE CBC AUTOMATED: CPT

## 2025-05-20 PROCEDURE — 83605 ASSAY OF LACTIC ACID: CPT

## 2025-05-20 PROCEDURE — 700111 HCHG RX REV CODE 636 W/ 250 OVERRIDE (IP): Mod: JZ | Performed by: STUDENT IN AN ORGANIZED HEALTH CARE EDUCATION/TRAINING PROGRAM

## 2025-05-20 PROCEDURE — A9270 NON-COVERED ITEM OR SERVICE: HCPCS | Performed by: STUDENT IN AN ORGANIZED HEALTH CARE EDUCATION/TRAINING PROGRAM

## 2025-05-20 PROCEDURE — 85007 BL SMEAR W/DIFF WBC COUNT: CPT

## 2025-05-20 PROCEDURE — 700105 HCHG RX REV CODE 258: Performed by: EMERGENCY MEDICINE

## 2025-05-20 RX ORDER — PROMETHAZINE HYDROCHLORIDE 25 MG/1
12.5-25 TABLET ORAL EVERY 4 HOURS PRN
Status: DISCONTINUED | OUTPATIENT
Start: 2025-05-20 | End: 2025-05-23

## 2025-05-20 RX ORDER — DEXAMETHASONE SODIUM PHOSPHATE 4 MG/ML
4 INJECTION, SOLUTION INTRA-ARTICULAR; INTRALESIONAL; INTRAMUSCULAR; INTRAVENOUS; SOFT TISSUE EVERY 6 HOURS
Status: DISCONTINUED | OUTPATIENT
Start: 2025-05-20 | End: 2025-05-21

## 2025-05-20 RX ORDER — ONDANSETRON 4 MG/1
4 TABLET, ORALLY DISINTEGRATING ORAL EVERY 4 HOURS PRN
Status: DISCONTINUED | OUTPATIENT
Start: 2025-05-20 | End: 2025-05-23

## 2025-05-20 RX ORDER — AMOXICILLIN 250 MG
2 CAPSULE ORAL EVERY EVENING
Status: DISCONTINUED | OUTPATIENT
Start: 2025-05-20 | End: 2025-05-23

## 2025-05-20 RX ORDER — ACETAMINOPHEN 325 MG/1
650 TABLET ORAL EVERY 6 HOURS PRN
Status: DISCONTINUED | OUTPATIENT
Start: 2025-05-20 | End: 2025-05-23

## 2025-05-20 RX ORDER — PROCHLORPERAZINE EDISYLATE 5 MG/ML
5-10 INJECTION INTRAMUSCULAR; INTRAVENOUS EVERY 4 HOURS PRN
Status: DISCONTINUED | OUTPATIENT
Start: 2025-05-20 | End: 2025-05-23

## 2025-05-20 RX ORDER — IBUPROFEN 200 MG
200 TABLET ORAL EVERY 6 HOURS PRN
COMMUNITY

## 2025-05-20 RX ORDER — PILOCARPINE HYDROCHLORIDE 5 MG/1
5 TABLET, FILM COATED ORAL 3 TIMES DAILY
Status: DISCONTINUED | OUTPATIENT
Start: 2025-05-20 | End: 2025-05-23

## 2025-05-20 RX ORDER — SODIUM CHLORIDE, SODIUM LACTATE, POTASSIUM CHLORIDE, CALCIUM CHLORIDE 600; 310; 30; 20 MG/100ML; MG/100ML; MG/100ML; MG/100ML
INJECTION, SOLUTION INTRAVENOUS CONTINUOUS
Status: DISCONTINUED | OUTPATIENT
Start: 2025-05-20 | End: 2025-05-24

## 2025-05-20 RX ORDER — MORPHINE SULFATE 4 MG/ML
4 INJECTION INTRAVENOUS EVERY 4 HOURS PRN
Status: DISCONTINUED | OUTPATIENT
Start: 2025-05-20 | End: 2025-05-22

## 2025-05-20 RX ORDER — ETONOGESTREL 68 MG/1
1 IMPLANT SUBCUTANEOUS ONCE
COMMUNITY

## 2025-05-20 RX ORDER — SODIUM CHLORIDE, SODIUM LACTATE, POTASSIUM CHLORIDE, AND CALCIUM CHLORIDE .6; .31; .03; .02 G/100ML; G/100ML; G/100ML; G/100ML
1000 INJECTION, SOLUTION INTRAVENOUS ONCE
Status: COMPLETED | OUTPATIENT
Start: 2025-05-20 | End: 2025-05-20

## 2025-05-20 RX ORDER — HYDROCODONE BITARTRATE AND ACETAMINOPHEN 5; 325 MG/1; MG/1
1 TABLET ORAL EVERY 6 HOURS PRN
Refills: 0 | Status: DISCONTINUED | OUTPATIENT
Start: 2025-05-20 | End: 2025-05-22

## 2025-05-20 RX ORDER — LABETALOL HYDROCHLORIDE 5 MG/ML
10 INJECTION, SOLUTION INTRAVENOUS EVERY 4 HOURS PRN
Status: DISCONTINUED | OUTPATIENT
Start: 2025-05-20 | End: 2025-05-25

## 2025-05-20 RX ORDER — PROMETHAZINE HYDROCHLORIDE 25 MG/1
12.5-25 SUPPOSITORY RECTAL EVERY 4 HOURS PRN
Status: DISCONTINUED | OUTPATIENT
Start: 2025-05-20 | End: 2025-05-23

## 2025-05-20 RX ORDER — ONDANSETRON 2 MG/ML
4 INJECTION INTRAMUSCULAR; INTRAVENOUS EVERY 4 HOURS PRN
Status: DISCONTINUED | OUTPATIENT
Start: 2025-05-20 | End: 2025-05-25 | Stop reason: HOSPADM

## 2025-05-20 RX ORDER — POLYETHYLENE GLYCOL 3350 17 G/17G
1 POWDER, FOR SOLUTION ORAL
Status: DISCONTINUED | OUTPATIENT
Start: 2025-05-20 | End: 2025-05-23

## 2025-05-20 RX ADMIN — PILOCARPINE HYDROCHLORIDE 5 MG: 5 TABLET, FILM COATED ORAL at 19:35

## 2025-05-20 RX ADMIN — ONDANSETRON 4 MG: 2 INJECTION INTRAMUSCULAR; INTRAVENOUS at 21:31

## 2025-05-20 RX ADMIN — MORPHINE SULFATE 4 MG: 4 INJECTION, SOLUTION INTRAMUSCULAR; INTRAVENOUS at 23:37

## 2025-05-20 RX ADMIN — DEXAMETHASONE SODIUM PHOSPHATE 4 MG: 4 INJECTION INTRA-ARTICULAR; INTRALESIONAL; INTRAMUSCULAR; INTRAVENOUS; SOFT TISSUE at 23:29

## 2025-05-20 RX ADMIN — SODIUM CHLORIDE, POTASSIUM CHLORIDE, SODIUM LACTATE AND CALCIUM CHLORIDE: 600; 310; 30; 20 INJECTION, SOLUTION INTRAVENOUS at 19:41

## 2025-05-20 RX ADMIN — MORPHINE SULFATE 4 MG: 4 INJECTION, SOLUTION INTRAMUSCULAR; INTRAVENOUS at 18:52

## 2025-05-20 RX ADMIN — AMPICILLIN SODIUM, SULBACTAM SODIUM 3 G: 2; 1 INJECTION, POWDER, FOR SOLUTION INTRAMUSCULAR; INTRAVENOUS at 18:52

## 2025-05-20 RX ADMIN — AMPICILLIN SODIUM, SULBACTAM SODIUM 3 G: 2; 1 INJECTION, POWDER, FOR SOLUTION INTRAMUSCULAR; INTRAVENOUS at 23:33

## 2025-05-20 RX ADMIN — SODIUM CHLORIDE, POTASSIUM CHLORIDE, SODIUM LACTATE AND CALCIUM CHLORIDE 1000 ML: 600; 310; 30; 20 INJECTION, SOLUTION INTRAVENOUS at 17:19

## 2025-05-20 ASSESSMENT — PAIN DESCRIPTION - PAIN TYPE
TYPE: ACUTE PAIN
TYPE: ACUTE PAIN

## 2025-05-20 ASSESSMENT — ENCOUNTER SYMPTOMS: FEVER: 1

## 2025-05-20 ASSESSMENT — FIBROSIS 4 INDEX: FIB4 SCORE: 0.81

## 2025-05-20 NOTE — ED TRIAGE NOTES
Chief Complaint   Patient presents with    Oral Swelling     Transfer from [place] for enlarged submandibular gland that is making it difficult for her to speak or swallow. Swelling has been getting worse for past few days. Sating well on RA, NAD noted. Patient was given 1L NS, toradol, unasyn, decadron, zofran, morphine and labetalol PTA

## 2025-05-20 NOTE — ED NOTES
Med Rec complete per patient   Allergies reviewed  Antibiotics in the past 30 days:no  Anticoagulant in past 14 days:no  Pharmacy patient utilizes:[facility]

## 2025-05-20 NOTE — ED PROVIDER NOTES
ED Provider Note    ED PHYSICIAN NOTE    CHIEF COMPLAINT  Chief Complaint   Patient presents with    Oral Swelling     Transfer from [place] for enlarged submandibular gland that is making it difficult for her to speak or swallow. Swelling has been getting worse for past few days. Sating well on RA, NAD noted. Patient was given 1L NS, toradol, unasyn, decadron, zofran, morphine and labetalol PTA       EXTERNAL RECORDS REVIEWED  External ED Note from [facility] from earlier today where patient was initially seen.  She had CT scan that showed enlarged right submandibular gland with infiltrative edema in the submandibular space extending to the right parotid and inferiorly to the hyoid and piriform sinus as well as to the right vallecula adjacent to epiglottis, white blood cell count 17, lactate was elevated as well.  Was given Decadron 10 mg, 3 g of Unasyn as well as 2 L IV fluid, Toradol and morphine    HPI/ROS  LIMITATION TO HISTORY   Select: : None  OUTSIDE HISTORIAN(S):  none    Mare Keren Eason is a 25 y.o. female who presents with swelling and pain to the right side of her neck and jaw.  She had been having some sore throat and pain for around 3 days but patient reports that the swelling began last night prompting her visit to outside emergency department today.  She states no difficulty breathing, has had no drooling, states it hurts to swallow but no dysphagia.  She has had no fever but she does report chills.  No other recent illness, headaches, chest pain, cough congestion or shortness of breath.  No abdominal pain, nausea or vomiting.  She was seen at outside facility and transferred here for further care    PAST MEDICAL HISTORY  Past Medical History[1]    SOCIAL HISTORY  Social History[2]    CURRENT MEDICATIONS  Home Medications       Reviewed by Gol Chen (Pharmacy Tech) on 05/20/25 at 1530  Med List Status: Complete     Medication Last Dose Status   ibuprofen (MOTRIN) 200 MG  "Tab 5/20/2025 Active                    ALLERGIES  Allergies[3]    PHYSICAL EXAM  VITAL SIGNS: BP (!) 157/99   Pulse 85   Temp 36.7 °C (98.1 °F) (Temporal)   Resp 20   Ht 1.575 m (5' 2\")   Wt 58.1 kg (128 lb)   SpO2 98%   BMI 23.41 kg/m²    Constitutional: Awake and alert   HENT: There is significant submandibular swelling noted right greater than left with tenderness, no obvious induration, there is tenderness anteriorly to the superior part of the neck as well.  There is no trismus, slight lingual elevation is noted but no pooled secretions, no signs of trauma  Eyes: Normal inspection, Pupils equal, non-icteric  Neck: Grossly normal range of motion. No stridor  Cardiovascular: Regular rate and rhythm, no murmurs.  Symmetric peripheral pulses at radial   Thorax & Lungs: No respiratory distress, No wheezing, No rales, No rhonchi, No chest tenderness.   Abdomen:  soft, non-distended, nontender, no mass  Skin: No obvious rash. Warm. Dry.   Back: No tenderness, No CVA tenderness.   Extremities: No cyanosis, no edema  Neurologic: AO3, Grossly normal,   Psychiatric: Normal affect for situation        DIAGNOSTIC STUDIES / PROCEDURES  LABS/EKG  Labs Reviewed   COMP METABOLIC PANEL - Abnormal; Notable for the following components:       Result Value    Co2 19 (*)     Glucose 126 (*)     Bun 7 (*)     Calcium 8.3 (*)     Correct Calcium 8.4 (*)     Alkaline Phosphatase 113 (*)     Globulin 3.7 (*)     All other components within normal limits   LACTIC ACID   ESTIMATED GFR   LACTIC ACID   LACTIC ACID   CBC WITH DIFFERENTIAL   BLOOD CULTURE   BLOOD CULTURE       I have independently interpreted this EKG as documented above      RADIOLOGY  I have independently interpreted the diagnostic imaging associated with this visit and am waiting the final reading from the radiologist.   My preliminary interpretation is as follows: I reviewed CT images in outside facility, no discernible abscess noted          COURSE & MEDICAL " DECISION MAKING    INITIAL ASSESSMENT, COURSE AND PLAN  Care Narrative: 2:58 PM  Patient presents with pain and swelling towards her neck and submandibular area.  CT from outside facility shows significant edema although no drainable fluid collection.  She does meet sepsis criteria they are with elevated white blood cell count as well as lactate and tachycardia.  She has already been given Unasyn.  I have ordered for diagnostic labs, will plan for hospitalization, will consult ENT as well    Case is discussed with Dr. Winchester from ENT.  She will evaluate the patient but recommends warm compresses, appropriate hydration and sialagogues    Case is discussed with Dr. Doyle For admission    Interventions  Medications - No data to display    Measures  Sepsis: Infection was suspected 258 (Time). Sepsis pathway was initiated. Fluids not needed (no hypotension or lactate greater than or equal to 4). Antibiotics were given per protocol.  Prior at outside facility with Unasyn as well as 2 L of IV fluid       PROBLEM LIST    # Sialoadenitis with significant soft tissue edema.  Diagnosed on CT from [facility].  No findings of drainable abscess.  No stone was noted on CT report either.  She has been started on Unasyn, given Decadron and will be admitted for ongoing care with ENT consultation    # Sepsis.  Secondary to above.  She did have leukocytosis of 17 at outside facility in addition to her infection and tachycardia with initial elevated lactate at outpatient facility.  Started on sepsis protocol here    # Hypertension.  History of      DISPOSITION AND DISCUSSIONS  I have discussed management of the patient with the following physicians and ELEN's:  as noted above    Patient is admitted in stable condition    FINAL DIAGNOSIS  1. Sepsis, due to unspecified organism, unspecified whether acute organ dysfunction present (HCC)        2. Sialadenitis               This dictation was created using voice recognition software.  The accuracy of the dictation is limited to the abilities of the software. I expect there may be some errors of grammar and possibly content. The nursing notes were reviewed and certain aspects of this information were incorporated into this note.    Electronically signed by: Orlando Mandel M.D., 5/20/2025          [1]   Past Medical History:  Diagnosis Date    Encounter for other contraceptive management 1/4/2024   [2]   Social History  Tobacco Use    Smoking status: Never    Smokeless tobacco: Never   Vaping Use    Vaping status: Never Used   Substance Use Topics    Alcohol use: Not Currently     Comment: occ    Drug use: Yes     Types: Marijuana, Inhaled     Comment: Marijuana    [3] No Known Allergies

## 2025-05-21 LAB
ALBUMIN SERPL BCP-MCNC: 3.5 G/DL (ref 3.2–4.9)
ALBUMIN/GLOB SERPL: 1.1 G/DL
ALP SERPL-CCNC: 93 U/L (ref 30–99)
ALT SERPL-CCNC: 16 U/L (ref 2–50)
ANION GAP SERPL CALC-SCNC: 12 MMOL/L (ref 7–16)
AST SERPL-CCNC: 23 U/L (ref 12–45)
BASOPHILS # BLD AUTO: 0.2 % (ref 0–1.8)
BASOPHILS # BLD: 0.03 K/UL (ref 0–0.12)
BILIRUB SERPL-MCNC: 0.3 MG/DL (ref 0.1–1.5)
BUN SERPL-MCNC: 8 MG/DL (ref 8–22)
CALCIUM ALBUM COR SERPL-MCNC: 8.8 MG/DL (ref 8.5–10.5)
CALCIUM SERPL-MCNC: 8.4 MG/DL (ref 8.5–10.5)
CHLORIDE SERPL-SCNC: 106 MMOL/L (ref 96–112)
CO2 SERPL-SCNC: 22 MMOL/L (ref 20–33)
CREAT SERPL-MCNC: 0.78 MG/DL (ref 0.5–1.4)
EOSINOPHIL # BLD AUTO: 0 K/UL (ref 0–0.51)
EOSINOPHIL NFR BLD: 0 % (ref 0–6.9)
ERYTHROCYTE [DISTWIDTH] IN BLOOD BY AUTOMATED COUNT: 47 FL (ref 35.9–50)
GFR SERPLBLD CREATININE-BSD FMLA CKD-EPI: 108 ML/MIN/1.73 M 2
GLOBULIN SER CALC-MCNC: 3.1 G/DL (ref 1.9–3.5)
GLUCOSE SERPL-MCNC: 115 MG/DL (ref 65–99)
HCT VFR BLD AUTO: 34.1 % (ref 37–47)
HGB BLD-MCNC: 11 G/DL (ref 12–16)
IMM GRANULOCYTES # BLD AUTO: 0.35 K/UL (ref 0–0.11)
IMM GRANULOCYTES NFR BLD AUTO: 1.8 % (ref 0–0.9)
INR PPP: 1.08 (ref 0.87–1.13)
LYMPHOCYTES # BLD AUTO: 0.79 K/UL (ref 1–4.8)
LYMPHOCYTES NFR BLD: 4 % (ref 22–41)
MAGNESIUM SERPL-MCNC: 1.9 MG/DL (ref 1.5–2.5)
MCH RBC QN AUTO: 28.4 PG (ref 27–33)
MCHC RBC AUTO-ENTMCNC: 32.3 G/DL (ref 32.2–35.5)
MCV RBC AUTO: 87.9 FL (ref 81.4–97.8)
MONOCYTES # BLD AUTO: 0.86 K/UL (ref 0–0.85)
MONOCYTES NFR BLD AUTO: 4.4 % (ref 0–13.4)
NEUTROPHILS # BLD AUTO: 17.69 K/UL (ref 1.82–7.42)
NEUTROPHILS NFR BLD: 89.6 % (ref 44–72)
NRBC # BLD AUTO: 0 K/UL
NRBC BLD-RTO: 0 /100 WBC (ref 0–0.2)
PLATELET # BLD AUTO: 174 K/UL (ref 164–446)
PMV BLD AUTO: 11 FL (ref 9–12.9)
POTASSIUM SERPL-SCNC: 3.6 MMOL/L (ref 3.6–5.5)
PROT SERPL-MCNC: 6.6 G/DL (ref 6–8.2)
PROTHROMBIN TIME: 14.1 SEC (ref 12–14.6)
RBC # BLD AUTO: 3.88 M/UL (ref 4.2–5.4)
SODIUM SERPL-SCNC: 140 MMOL/L (ref 135–145)
WBC # BLD AUTO: 19.7 K/UL (ref 4.8–10.8)

## 2025-05-21 PROCEDURE — A9270 NON-COVERED ITEM OR SERVICE: HCPCS | Performed by: STUDENT IN AN ORGANIZED HEALTH CARE EDUCATION/TRAINING PROGRAM

## 2025-05-21 PROCEDURE — 700102 HCHG RX REV CODE 250 W/ 637 OVERRIDE(OP): Performed by: STUDENT IN AN ORGANIZED HEALTH CARE EDUCATION/TRAINING PROGRAM

## 2025-05-21 PROCEDURE — 85610 PROTHROMBIN TIME: CPT

## 2025-05-21 PROCEDURE — 700111 HCHG RX REV CODE 636 W/ 250 OVERRIDE (IP): Performed by: STUDENT IN AN ORGANIZED HEALTH CARE EDUCATION/TRAINING PROGRAM

## 2025-05-21 PROCEDURE — 770006 HCHG ROOM/CARE - MED/SURG/GYN SEMI*

## 2025-05-21 PROCEDURE — 85025 COMPLETE CBC W/AUTO DIFF WBC: CPT

## 2025-05-21 PROCEDURE — 700102 HCHG RX REV CODE 250 W/ 637 OVERRIDE(OP)

## 2025-05-21 PROCEDURE — 700105 HCHG RX REV CODE 258: Performed by: STUDENT IN AN ORGANIZED HEALTH CARE EDUCATION/TRAINING PROGRAM

## 2025-05-21 PROCEDURE — A9270 NON-COVERED ITEM OR SERVICE: HCPCS

## 2025-05-21 PROCEDURE — 83735 ASSAY OF MAGNESIUM: CPT

## 2025-05-21 PROCEDURE — 80053 COMPREHEN METABOLIC PANEL: CPT

## 2025-05-21 RX ORDER — DIPHENHYDRAMINE HCL 25 MG
25 TABLET ORAL EVERY 6 HOURS PRN
Status: DISCONTINUED | OUTPATIENT
Start: 2025-05-21 | End: 2025-05-23

## 2025-05-21 RX ORDER — IBUPROFEN 400 MG/1
400 TABLET, FILM COATED ORAL 3 TIMES DAILY
Status: DISCONTINUED | OUTPATIENT
Start: 2025-05-21 | End: 2025-05-22

## 2025-05-21 RX ADMIN — AMPICILLIN SODIUM, SULBACTAM SODIUM 3 G: 2; 1 INJECTION, POWDER, FOR SOLUTION INTRAMUSCULAR; INTRAVENOUS at 11:47

## 2025-05-21 RX ADMIN — DIPHENHYDRAMINE HYDROCHLORIDE 25 MG: 25 TABLET ORAL at 20:23

## 2025-05-21 RX ADMIN — IBUPROFEN 400 MG: 800 TABLET, FILM COATED ORAL at 20:23

## 2025-05-21 RX ADMIN — HYDROCODONE BITARTRATE AND ACETAMINOPHEN 1 TABLET: 5; 325 TABLET ORAL at 05:44

## 2025-05-21 RX ADMIN — DEXAMETHASONE SODIUM PHOSPHATE 4 MG: 4 INJECTION INTRA-ARTICULAR; INTRALESIONAL; INTRAMUSCULAR; INTRAVENOUS; SOFT TISSUE at 05:44

## 2025-05-21 RX ADMIN — AMPICILLIN SODIUM, SULBACTAM SODIUM 3 G: 2; 1 INJECTION, POWDER, FOR SOLUTION INTRAMUSCULAR; INTRAVENOUS at 23:44

## 2025-05-21 RX ADMIN — MORPHINE SULFATE 4 MG: 4 INJECTION, SOLUTION INTRAMUSCULAR; INTRAVENOUS at 17:25

## 2025-05-21 RX ADMIN — SODIUM CHLORIDE, POTASSIUM CHLORIDE, SODIUM LACTATE AND CALCIUM CHLORIDE: 600; 310; 30; 20 INJECTION, SOLUTION INTRAVENOUS at 16:24

## 2025-05-21 RX ADMIN — AMPICILLIN SODIUM, SULBACTAM SODIUM 3 G: 2; 1 INJECTION, POWDER, FOR SOLUTION INTRAMUSCULAR; INTRAVENOUS at 17:28

## 2025-05-21 RX ADMIN — AMPICILLIN SODIUM, SULBACTAM SODIUM 3 G: 2; 1 INJECTION, POWDER, FOR SOLUTION INTRAMUSCULAR; INTRAVENOUS at 05:47

## 2025-05-21 RX ADMIN — MORPHINE SULFATE 4 MG: 4 INJECTION, SOLUTION INTRAMUSCULAR; INTRAVENOUS at 12:55

## 2025-05-21 RX ADMIN — MORPHINE SULFATE 4 MG: 4 INJECTION, SOLUTION INTRAMUSCULAR; INTRAVENOUS at 23:39

## 2025-05-21 RX ADMIN — DEXAMETHASONE SODIUM PHOSPHATE 4 MG: 4 INJECTION INTRA-ARTICULAR; INTRALESIONAL; INTRAMUSCULAR; INTRAVENOUS; SOFT TISSUE at 11:41

## 2025-05-21 RX ADMIN — IBUPROFEN 400 MG: 800 TABLET, FILM COATED ORAL at 16:20

## 2025-05-21 SDOH — ECONOMIC STABILITY: TRANSPORTATION INSECURITY
IN THE PAST 12 MONTHS, HAS THE LACK OF TRANSPORTATION KEPT YOU FROM MEDICAL APPOINTMENTS OR FROM GETTING MEDICATIONS?: NO

## 2025-05-21 SDOH — ECONOMIC STABILITY: TRANSPORTATION INSECURITY
IN THE PAST 12 MONTHS, HAS LACK OF RELIABLE TRANSPORTATION KEPT YOU FROM MEDICAL APPOINTMENTS, MEETINGS, WORK OR FROM GETTING THINGS NEEDED FOR DAILY LIVING?: NO

## 2025-05-21 ASSESSMENT — ENCOUNTER SYMPTOMS
ABDOMINAL PAIN: 0
VOMITING: 0
EYE PAIN: 0
COUGH: 0
FALLS: 0
DIZZINESS: 0
FEVER: 0
BLURRED VISION: 0
CHILLS: 0
FOCAL WEAKNESS: 0
INSOMNIA: 0
BACK PAIN: 0
SENSORY CHANGE: 0
LOSS OF CONSCIOUSNESS: 0
HEADACHES: 0
SHORTNESS OF BREATH: 0
PALPITATIONS: 0
NAUSEA: 0

## 2025-05-21 ASSESSMENT — LIFESTYLE VARIABLES
TOTAL SCORE: 0
TOTAL SCORE: 0
HOW MANY TIMES IN THE PAST YEAR HAVE YOU HAD 5 OR MORE DRINKS IN A DAY: 0
AVERAGE NUMBER OF DAYS PER WEEK YOU HAVE A DRINK CONTAINING ALCOHOL: 2
ON A TYPICAL DAY WHEN YOU DRINK ALCOHOL HOW MANY DRINKS DO YOU HAVE: 3
EVER HAD A DRINK FIRST THING IN THE MORNING TO STEADY YOUR NERVES TO GET RID OF A HANGOVER: NO
TOTAL SCORE: 0
ALCOHOL_USE: YES
SUBSTANCE_ABUSE: 0
HAVE PEOPLE ANNOYED YOU BY CRITICIZING YOUR DRINKING: NO
HAVE YOU EVER FELT YOU SHOULD CUT DOWN ON YOUR DRINKING: NO
EVER FELT BAD OR GUILTY ABOUT YOUR DRINKING: NO
CONSUMPTION TOTAL: NEGATIVE

## 2025-05-21 ASSESSMENT — SOCIAL DETERMINANTS OF HEALTH (SDOH)
WITHIN THE LAST YEAR, HAVE TO BEEN RAPED OR FORCED TO HAVE ANY KIND OF SEXUAL ACTIVITY BY YOUR PARTNER OR EX-PARTNER?: NO
WITHIN THE LAST YEAR, HAVE YOU BEEN AFRAID OF YOUR PARTNER OR EX-PARTNER?: NO
IN THE PAST 12 MONTHS, HAS THE ELECTRIC, GAS, OIL, OR WATER COMPANY THREATENED TO SHUT OFF SERVICE IN YOUR HOME?: NO
WITHIN THE PAST 12 MONTHS, THE FOOD YOU BOUGHT JUST DIDN'T LAST AND YOU DIDN'T HAVE MONEY TO GET MORE: NEVER TRUE
WITHIN THE LAST YEAR, HAVE YOU BEEN HUMILIATED OR EMOTIONALLY ABUSED IN OTHER WAYS BY YOUR PARTNER OR EX-PARTNER?: NO
WITHIN THE PAST 12 MONTHS, YOU WORRIED THAT YOUR FOOD WOULD RUN OUT BEFORE YOU GOT THE MONEY TO BUY MORE: NEVER TRUE
WITHIN THE LAST YEAR, HAVE YOU BEEN KICKED, HIT, SLAPPED, OR OTHERWISE PHYSICALLY HURT BY YOUR PARTNER OR EX-PARTNER?: NO

## 2025-05-21 ASSESSMENT — COGNITIVE AND FUNCTIONAL STATUS - GENERAL
DAILY ACTIVITIY SCORE: 24
MOBILITY SCORE: 24
SUGGESTED CMS G CODE MODIFIER DAILY ACTIVITY: CH
SUGGESTED CMS G CODE MODIFIER MOBILITY: CH

## 2025-05-21 ASSESSMENT — PAIN DESCRIPTION - PAIN TYPE
TYPE: ACUTE PAIN

## 2025-05-21 NOTE — PROGRESS NOTES
4 Eyes Skin Assessment Completed by GISELA Blue and GISELA De Souza.    Head WDL  Ears WDL  Nose WDL  Mouth Swelling  Neck Swelling to throat  Breast/Chest WDL  Shoulder Blades WDL  Spine WDL  (R) Arm/Elbow/Hand WDL  (L) Arm/Elbow/Hand WDL  Abdomen WDL  Groin WDL  Scrotum/Coccyx/Buttocks WDL  (R) Leg WDL  (L) Leg WDL  (R) Heel/Foot/Toe WDL  (L) Heel/Foot/Toe WDL          Devices In Places IV      Interventions In Place Pillows    Possible Skin Injury No    Pictures Uploaded Into Epic N/A  Wound Consult Placed N/A  RN Wound Prevention Protocol Ordered No

## 2025-05-21 NOTE — H&P
Hospital Medicine History & Physical Note    Date of Service  5/20/2025    Primary Care Physician  Pcp Pt States None    Consultants  ENT    Specialist Names: Dr. Winchester    Code Status  Full Code    Chief Complaint  Chief Complaint   Patient presents with    Oral Swelling     Transfer from [place] for enlarged submandibular gland that is making it difficult for her to speak or swallow. Swelling has been getting worse for past few days. Sating well on RA, NAD noted. Patient was given 1L NS, toradol, unasyn, decadron, zofran, morphine and labetalol PTA       History of Presenting Illness  Mare Eason is a 25 y.o. female without significant chronic medical conditions who presented 5/20/2025 with oral/face swelling.    Patient presented to [facility] today for swelling to submandibular area making it difficult for her to speak or swallow, swelling has been progressive over the past 3 days.  No distress, saturating well on room air, tolerating oral secretions.  She had a CT which showed enlarged right submandibular gland with infiltrative edema in the submandibular space extending to the right parotid inferiorly to the hyoid and piriform sinus as well as to the right vallecula adjacent to the epiglottis.  WBC 17, lactate was 3, she was given Decadron 10 mg, 3 g of Unasyn, 2 L IV fluid, Toradol, morphine transferred to [facility].  No headache or vision changes, no chest pain dyspnea cough vomiting abdominal pain dysuria diarrhea.    In the ED she is afebrile with normal pulse and respiratory rate systolic blood pressure 150s to 170s pulse ox 95 to 98% on room air.  Leukocytosis 21.8, bicarb 19, calcium 8.4 alk phos 113 globulin 3.7 lactate 1.4.  Blood cultures were obtained.  ERP discussed with ENT Dr. Winchester recommends admission for IV antibiotics, warm compresses, sialagogues, fluids, will see patient in consult.  Patient subsequently referred to hospitalist for admission.    I discussed the plan of  care with patient, bedside RN, charge RN, , and pharmacy.    Review of Systems  Review of Systems   Constitutional:  Positive for fever.   HENT:          Swelling to left face, neck, odynophagia       Past Medical History   has a past medical history of Encounter for other contraceptive management (2024).    Surgical History   has a past surgical history that includes pr  delivery only (N/A, 2020) and repeat c section (N/A, 10/18/2023).     Family History  family history includes No Known Problems in her brother, mother, and sister.   Family history reviewed with patient. There is no family history that is pertinent to the chief complaint.     Social History   reports that she has never smoked. She has never used smokeless tobacco. She reports that she does not currently use alcohol. She reports current drug use. Drugs: Marijuana and Inhaled.    Allergies  Allergies[1]    Medications  Prior to Admission Medications   Prescriptions Last Dose Informant Patient Reported? Taking?   etonogestrel (NEXPLANON) 68 MG Implant implant  Patient Yes Yes   Si Each by Subdermal route one time.   ibuprofen (MOTRIN) 200 MG Tab 2025 Morning Patient Yes Yes   Sig: Take 200 mg by mouth every 6 hours as needed for Mild Pain.      Facility-Administered Medications: None       Physical Exam  Temp:  [36.7 °C (98.1 °F)] 36.7 °C (98.1 °F)  Pulse:  [84-86] 86  Resp:  [16-20] 16  BP: (155-173)/() 155/92  SpO2:  [95 %-98 %] 97 %  Blood Pressure: (!) 155/92   Temperature: 36.7 °C (98.1 °F)   Pulse: 86   Respiration: 16   Pulse Oximetry: 97 %       Physical Exam  Vitals and nursing note reviewed.   Constitutional:       General: She is in acute distress (2/2 face/neck swelling).      Appearance: She is not toxic-appearing.   HENT:      Head: Normocephalic and atraumatic.      Nose: Nose normal. No rhinorrhea.      Mouth/Throat:      Mouth: Mucous membranes are dry.      Pharynx: Oropharynx is clear.       Comments: Swelling left submandibular area into her right side of her face and into her neck, tender, no fluctuance or induration, no wounds, no trismus, no pooled secretions.  Eyes:      General: No scleral icterus.     Extraocular Movements: Extraocular movements intact.      Conjunctiva/sclera: Conjunctivae normal.   Neck:      Comments: Tenderness to right and anterior neck with swelling  Cardiovascular:      Rate and Rhythm: Normal rate and regular rhythm.      Pulses: Normal pulses.   Pulmonary:      Effort: No respiratory distress.      Breath sounds: No wheezing, rhonchi or rales.   Abdominal:      General: There is no distension.      Palpations: Abdomen is soft.      Tenderness: There is no abdominal tenderness. There is no guarding or rebound.   Musculoskeletal:         General: Normal range of motion.      Cervical back: Normal range of motion and neck supple. Tenderness present.   Skin:     General: Skin is warm and dry.      Capillary Refill: Capillary refill takes less than 2 seconds.   Neurological:      General: No focal deficit present.      Mental Status: She is alert and oriented to person, place, and time. Mental status is at baseline.      Cranial Nerves: No cranial nerve deficit.   Psychiatric:         Mood and Affect: Mood normal.         Behavior: Behavior normal.         Thought Content: Thought content normal.         Judgment: Judgment normal.         Laboratory:  Recent Labs     05/20/25  0952 05/20/25  1539   WBC 17.80* 21.8*   RBC 4.50 4.26   HEMOGLOBIN 12.6 12.1   HEMATOCRIT 38.4 37.4   MCV 85.3 87.8   MCH 27.9 28.4   MCHC 32.8* 32.4   RDW 14.8 46.5   PLATELETCT 174 192   MPV 8.6 11.3     Recent Labs     05/20/25  0952 05/20/25  1539   SODIUM 145 142   POTASSIUM 3.4* 3.6   CHLORIDE 109 108   CO2 21.0 19*   GLUCOSE 114 126*   BUN 6.0 7*   CREATININE 0.90 0.78   CALCIUM 8.9 8.3*     Recent Labs     05/20/25  0952 05/20/25  1539   ALTSGPT  --  17   ASTSGOT  --  25   ALKPHOSPHAT  --   "113*   TBILIRUBIN  --  0.4   GLUCOSE 114 126*         No results for input(s): \"NTPROBNP\" in the last 72 hours.      No results for input(s): \"TROPONINT\" in the last 72 hours.    Imaging:  CT-OUTSIDE IMAGES-CT NECK   Final Result          no X-Ray or EKG requiring interpretation    Assessment/Plan:  Justification for Admission Status  I anticipate this patient will require at least two midnights for appropriate medical management, necessitating inpatient admission because infection and swelling to submandibular region extending into the neck and vallecula      * Sialadenitis- (present on admission)  Assessment & Plan  ERP consulted ENT, recommends antibiotics, sialagogues, will see patient in consult.  Mild odynophagia, tolerating oral secretions, no respiratory compromise.  Unasyn and pilocarpine ordered.  Steroids ordered for significant swelling and odynophagia.  Warm compresses, IV fluids.  CT at OSH shows \"enlarged right submandibular gland with infiltrative edema in right submandibular space extending superiorly to the level of the right parotid gland inferiorly down to the right hyoid bone and piriform sinus area, edema also extends into the right vallecula adjacent to the epiglottis, fluid density noted in the right tongue base related to probable obstruction of the right submandibular duct, no drainable fluid collection or abscess.\"    Hypocalcemia  Assessment & Plan  Mildly low 8.4, asymptomatic.  Encourage p.o. intake, recheck level in the morning.    Metabolic acidosis  Assessment & Plan  2/2 infectious process, bicarb 19, lactate 1.4 here, was 3 at OSH.  IV fluids, antibiotics for infection, recheck labs in the morning.        VTE prophylaxis: SCDs/TEDs  Total time spent on admission 77 minutes.    This included my review with ER physician, review of ED events, patient's history, outside records, recent records, face to face interview, physical examination; my review of lab results (CBC, chemistry " panel), imaging review (CXR) and ECG. Also includes counseling patient on admission, treatment plan, and documentation of encounter.         [1] No Known Allergies

## 2025-05-21 NOTE — PROGRESS NOTES
Assumed care of patient at 1845. Bedside report received from day RN. Assessment complete.  AA&Ox4 Denies CP/SOB.  Reporting 4/10 pain. Declined intervention at this time.  Educated patient regarding pharmacologic and non pharmacologic modalities for pain management.  Skin per flowsheets  Pt unable to tolerate regular diet related to throat swelling. Denies N/V.  Last BM 5/18 PTA  Pt ambulates SBA.  All needs met at this time. Call light within reach. Pt calls appropriately. Bed low and locked, non skid socks in place. Hourly rounding in place.

## 2025-05-21 NOTE — PROGRESS NOTES
[facility] Medicine Daily Progress Note    Date of Service  5/21/2025    Chief Complaint  Mare Eason is a 25 y.o. female admitted 5/20/2025 with facial swelling.    Hospital Course    Mare Eason is a 25 y.o. female without significant chronic medical conditions who presented 5/20/2025 with oral/face swelling.     Patient presented to [facility] today for swelling to submandibular area making it difficult for her to speak or swallow, swelling has been progressive over the past 3 days.  No distress, saturating well on room air, tolerating oral secretions.  She had a CT which showed enlarged right submandibular gland with infiltrative edema in the submandibular space extending to the right parotid inferiorly to the hyoid and piriform sinus as well as to the right vallecula adjacent to the epiglottis.  WBC 17, lactate was 3, she was given Decadron 10 mg, 3 g of Unasyn, 2 L IV fluid, Toradol, morphine transferred to [facility].  No headache or vision changes, no chest pain dyspnea cough vomiting abdominal pain dysuria diarrhea.     In the ED she is afebrile with normal pulse and respiratory rate systolic blood pressure 150s to 170s pulse ox 95 to 98% on room air.  Leukocytosis 21.8, bicarb 19, calcium 8.4 alk phos 113 globulin 3.7 lactate 1.4.  Blood cultures were obtained.  ERP discussed with ENT Dr. Winchester recommends admission for IV antibiotics, warm compresses, sialagogues, fluids, will see patient in consult.  Patient subsequently referred to hospitalist for admission.    Interval Problem Update  No acute events overnight.  Patient reports ongoing facial swelling, feels her tongue is swollen.  She denies any dyspnea. She states barely able to tolerate liquids but no reported regurgitation thus far. She feels steroids are causing worsening swelling symptoms.  Patient is agreeable to starting scheduled ibuprofen and stopping decadron.  Continue IV antibiotics for submandibular gland  infection.  Continue IV fluids.  Continue full liquid diet for now.  Appreciate ENT recommendations as well, do not anticipate any surgical intervention at this time.      I have discussed this patient's plan of care and discharge plan at IDT rounds today with Case Management, Nursing, Nursing leadership, and other members of the IDT team.    Consultants/Specialty  ENT    Code Status  Full Code    Disposition  Medically Cleared  I have placed the appropriate orders for post-discharge needs.    Review of Systems  Review of Systems   Constitutional:  Negative for chills and fever.   Eyes:  Negative for blurred vision and pain.   Respiratory:  Negative for cough and shortness of breath.    Cardiovascular:  Negative for chest pain, palpitations and leg swelling.   Gastrointestinal:  Negative for abdominal pain, nausea and vomiting.   Genitourinary:  Negative for dysuria and urgency.   Musculoskeletal:  Negative for back pain and falls.   Skin:  Negative for itching and rash.   Neurological:  Negative for dizziness, sensory change, focal weakness, loss of consciousness and headaches.   Psychiatric/Behavioral:  Negative for substance abuse. The patient does not have insomnia.         Physical Exam  Temp:  [36.4 °C (97.6 °F)-36.8 °C (98.3 °F)] 36.4 °C (97.6 °F)  Pulse:  [58-89] 60  Resp:  [14-18] 18  BP: (138-171)/() 138/88  SpO2:  [97 %-99 %] 98 %    Physical Exam  Vitals reviewed.   Constitutional:       General: She is not in acute distress.     Appearance: She is not diaphoretic.   HENT:      Head: Normocephalic and atraumatic.      Right Ear: External ear normal.      Left Ear: External ear normal.      Nose: Nose normal. No congestion.      Mouth/Throat:      Pharynx: No oropharyngeal exudate or posterior oropharyngeal erythema.      Comments: Submandibular swelling on right crossing midline to under chin  Eyes:      Extraocular Movements: Extraocular movements intact.      Pupils: Pupils are equal, round, and  reactive to light.   Cardiovascular:      Rate and Rhythm: Normal rate and regular rhythm.   Pulmonary:      Effort: Pulmonary effort is normal. No respiratory distress.      Breath sounds: Normal breath sounds. No stridor. No wheezing, rhonchi or rales.   Abdominal:      General: Bowel sounds are normal. There is no distension.      Palpations: Abdomen is soft.      Tenderness: There is no abdominal tenderness.   Musculoskeletal:         General: No swelling. Normal range of motion.      Cervical back: Normal range of motion and neck supple.      Right lower leg: No edema.      Left lower leg: No edema.   Skin:     General: Skin is warm and dry.   Neurological:      General: No focal deficit present.      Mental Status: She is alert and oriented to person, place, and time.      Cranial Nerves: No cranial nerve deficit.      Motor: No weakness.   Psychiatric:         Mood and Affect: Mood normal.         Behavior: Behavior normal.         Fluids    Intake/Output Summary (Last 24 hours) at 5/21/2025 1704  Last data filed at 5/20/2025 1930  Gross per 24 hour   Intake 1000 ml   Output 0 ml   Net 1000 ml        Laboratory  Recent Labs     05/20/25  0952 05/20/25  1539 05/21/25  0107   WBC 17.80* 21.8* 19.7*   RBC 4.50 4.26 3.88*   HEMOGLOBIN 12.6 12.1 11.0*   HEMATOCRIT 38.4 37.4 34.1*   MCV 85.3 87.8 87.9   MCH 27.9 28.4 28.4   MCHC 32.8* 32.4 32.3   RDW 14.8 46.5 47.0   PLATELETCT 174 192 174   MPV 8.6 11.3 11.0     Recent Labs     05/20/25  0952 05/20/25  1539 05/21/25  0107   SODIUM 145 142 140   POTASSIUM 3.4* 3.6 3.6   CHLORIDE 109 108 106   CO2 21.0 19* 22   GLUCOSE 114 126* 115*   BUN 6.0 7* 8   CREATININE 0.90 0.78 0.78   CALCIUM 8.9 8.3* 8.4*     Recent Labs     05/21/25  0107   INR 1.08               Imaging  CT-OUTSIDE IMAGES-CT NECK   Final Result           Assessment/Plan  * Sialadenitis- (present on admission)  Assessment & Plan  ERP consulted ENT, recommends antibiotics, sialagogues, will see patient in  "consult.  Mild odynophagia, tolerating oral secretions, no respiratory compromise.  Unasyn and pilocarpine ordered.  Steroids ordered for significant swelling and odynophagia. Patient states steroids making swelling worse, steroids stopped, ibuprofen ordered  Warm compresses, IV fluids.  CT at OSH shows \"enlarged right submandibular gland with infiltrative edema in right submandibular space extending superiorly to the level of the right parotid gland inferiorly down to the right hyoid bone and piriform sinus area, edema also extends into the right vallecula adjacent to the epiglottis, fluid density noted in the right tongue base related to probable obstruction of the right submandibular duct, no drainable fluid collection or abscess.\"    Hypocalcemia  Assessment & Plan  Mildly low 8.4, asymptomatic.  Encourage p.o. intake, recheck level in the morning.    Metabolic acidosis  Assessment & Plan  2/2 infectious process, bicarb 19, lactate 1.4 here, was 3 at OSH.  IV fluids, antibiotics for infection, recheck labs in the morning.         VTE prophylaxis: VTE Selection    I have performed a physical exam and reviewed and updated ROS and Plan today (5/21/2025). In review of yesterday's note (5/20/2025), there are no changes except as documented above.        "

## 2025-05-21 NOTE — ASSESSMENT & PLAN NOTE
2/2 infectious process, bicarb 19, lactate 1.4 here, was 3 at OSH.  IV fluids, antibiotics for infection, recheck labs in the morning.

## 2025-05-21 NOTE — CARE PLAN
The patient is Stable - Low risk of patient condition declining or worsening    Shift Goals  Clinical Goals: Pt will report a pain level of 3/10 or less this shift  Patient Goals: Rest, pain relief, POC updates.  Family Goals: Updates on POC via phone    Progress made toward(s) clinical / shift goals:    Problem: Knowledge Deficit - Standard  Goal: Patient and family/care givers will demonstrate understanding of plan of care, disease process/condition, diagnostic tests and medications  Outcome: Progressing  Pt states understanding of steroids and abx along with her POC to consult with ENT this morning.     Problem: Pain - Standard  Goal: Alleviation of pain or a reduction in pain to the patient’s comfort goal  Outcome: Progressing  Pt reports a tolerable pain level after morphine administration. From 7/10 to 3/10 pain.        Patient is not progressing towards the following goals:

## 2025-05-21 NOTE — CARE PLAN
Problem: Knowledge Deficit - Standard  Goal: Patient and family/care givers will demonstrate understanding of plan of care, disease process/condition, diagnostic tests and medications  Outcome: Progressing     Problem: Respiratory  Goal: Patient will achieve/maintain optimum respiratory ventilation and gas exchange  Outcome: Progressing     Problem: Pain - Standard  Goal: Alleviation of pain or a reduction in pain to the patient’s comfort goal  Outcome: Progressing   The patient is Watcher - Medium risk of patient condition declining or worsening    Shift Goals  Clinical Goals: iv abx, iv steroids, pain control  Patient Goals: rest comfort  Family Goals: updates    Progress made toward(s) clinical / shift goals:       Patient is not progressing towards the following goals:

## 2025-05-21 NOTE — ASSESSMENT & PLAN NOTE
"ERP consulted ENT, recommends antibiotics, sialagogues, will see patient in consult.  Mild odynophagia, tolerating oral secretions, no respiratory compromise.  Unasyn and pilocarpine ordered.  CT at OSH shows \"enlarged right submandibular gland with infiltrative edema in right submandibular space extending superiorly to the level of the right parotid gland inferiorly down to the right hyoid bone and piriform sinus area, edema also extends into the right vallecula adjacent to the epiglottis, fluid density noted in the right tongue base related to probable obstruction of the right submandibular duct, no drainable fluid collection or abscess.\"  S/p I&D by ENT, Penrose drain removed  Plan to monitor in ICU post operatively, currently improved to transfer back to the floor  Continue IV antibiotics, steroids, fluids  "

## 2025-05-22 ENCOUNTER — APPOINTMENT (OUTPATIENT)
Dept: RADIOLOGY | Facility: MEDICAL CENTER | Age: 25
DRG: 137 | End: 2025-05-22
Attending: INTERNAL MEDICINE
Payer: MEDICAID

## 2025-05-22 ENCOUNTER — ANESTHESIA (OUTPATIENT)
Dept: SURGERY | Facility: MEDICAL CENTER | Age: 25
DRG: 137 | End: 2025-05-22
Payer: MEDICAID

## 2025-05-22 ENCOUNTER — SURGERY (OUTPATIENT)
Age: 25
End: 2025-05-22
Payer: MEDICAID

## 2025-05-22 ENCOUNTER — ANESTHESIA EVENT (OUTPATIENT)
Dept: SURGERY | Facility: MEDICAL CENTER | Age: 25
DRG: 137 | End: 2025-05-22
Payer: MEDICAID

## 2025-05-22 PROBLEM — Z99.11 ENCOUNTER FOR WEANING FROM VENTILATOR (HCC): Status: ACTIVE | Noted: 2025-05-22

## 2025-05-22 LAB
ANION GAP SERPL CALC-SCNC: 11 MMOL/L (ref 7–16)
BASE EXCESS BLDA CALC-SCNC: 2 MMOL/L (ref -4–3)
BODY TEMPERATURE: ABNORMAL DEGREES
BREATHS SETTING VENT: 12
BUN SERPL-MCNC: 12 MG/DL (ref 8–22)
CALCIUM SERPL-MCNC: 8.4 MG/DL (ref 8.5–10.5)
CHLORIDE SERPL-SCNC: 107 MMOL/L (ref 96–112)
CO2 BLDA-SCNC: 30 MMOL/L (ref 32–48)
CO2 SERPL-SCNC: 23 MMOL/L (ref 20–33)
CREAT SERPL-MCNC: 0.68 MG/DL (ref 0.5–1.4)
DELSYS IDSYS: ABNORMAL
ERYTHROCYTE [DISTWIDTH] IN BLOOD BY AUTOMATED COUNT: 46.9 FL (ref 35.9–50)
GFR SERPLBLD CREATININE-BSD FMLA CKD-EPI: 124 ML/MIN/1.73 M 2
GLUCOSE SERPL-MCNC: 94 MG/DL (ref 65–99)
HCO3 BLDA-SCNC: 28.5 MMOL/L (ref 21–28)
HCT VFR BLD AUTO: 32.9 % (ref 37–47)
HGB BLD-MCNC: 10.7 G/DL (ref 12–16)
HOROWITZ INDEX BLDA+IHG-RTO: 310 MM[HG]
LACTATE BLD-SCNC: 0.7 MMOL/L (ref 0.5–2)
MCH RBC QN AUTO: 28.5 PG (ref 27–33)
MCHC RBC AUTO-ENTMCNC: 32.5 G/DL (ref 32.2–35.5)
MCV RBC AUTO: 87.5 FL (ref 81.4–97.8)
MODE IMODE: ABNORMAL
O2/TOTAL GAS SETTING VFR VENT: 30 %
PCO2 BLDA: 51.1 MMHG (ref 32–48)
PCO2 TEMP ADJ BLDA: 49.6 MMHG (ref 32–48)
PEEP END EXPIRATORY PRESSURE IPEEP: 8 CMH20
PH BLDA: 7.35 [PH] (ref 7.35–7.45)
PH TEMP ADJ BLDA: 7.36 [PH] (ref 7.35–7.45)
PLATELET # BLD AUTO: 167 K/UL (ref 164–446)
PMV BLD AUTO: 11.7 FL (ref 9–12.9)
PO2 BLDA: 93 MMHG (ref 83–108)
PO2 TEMP ADJ BLDA: 89 MMHG (ref 83–108)
POTASSIUM SERPL-SCNC: 3.7 MMOL/L (ref 3.6–5.5)
RBC # BLD AUTO: 3.76 M/UL (ref 4.2–5.4)
SAO2 % BLDA: 97 % (ref 93–99)
SODIUM SERPL-SCNC: 141 MMOL/L (ref 135–145)
SPECIMEN DRAWN FROM PATIENT: ABNORMAL
TIDAL VOLUME IVT: 310 ML
TRIGL SERPL-MCNC: 80 MG/DL (ref 0–149)
WBC # BLD AUTO: 15.2 K/UL (ref 4.8–10.8)

## 2025-05-22 PROCEDURE — 80048 BASIC METABOLIC PNL TOTAL CA: CPT

## 2025-05-22 PROCEDURE — 0W9300Z DRAINAGE OF ORAL CAVITY AND THROAT WITH DRAINAGE DEVICE, OPEN APPROACH: ICD-10-PCS | Performed by: OTOLARYNGOLOGY

## 2025-05-22 PROCEDURE — 87205 SMEAR GRAM STAIN: CPT

## 2025-05-22 PROCEDURE — 71045 X-RAY EXAM CHEST 1 VIEW: CPT

## 2025-05-22 PROCEDURE — 87075 CULTR BACTERIA EXCEPT BLOOD: CPT

## 2025-05-22 PROCEDURE — 160038 HCHG SURGERY MINUTES - EA ADDL 1 MIN LEVEL 2: Performed by: OTOLARYNGOLOGY

## 2025-05-22 PROCEDURE — 700101 HCHG RX REV CODE 250

## 2025-05-22 PROCEDURE — 87077 CULTURE AEROBIC IDENTIFY: CPT

## 2025-05-22 PROCEDURE — 160015 HCHG STAT PREOP MINUTES: Performed by: OTOLARYNGOLOGY

## 2025-05-22 PROCEDURE — 770022 HCHG ROOM/CARE - ICU (200)

## 2025-05-22 PROCEDURE — 87070 CULTURE OTHR SPECIMN AEROBIC: CPT

## 2025-05-22 PROCEDURE — C1729 CATH, DRAINAGE: HCPCS | Performed by: OTOLARYNGOLOGY

## 2025-05-22 PROCEDURE — 700105 HCHG RX REV CODE 258: Performed by: ANESTHESIOLOGY

## 2025-05-22 PROCEDURE — 160048 HCHG OR STATISTICAL LEVEL 1-5: Performed by: OTOLARYNGOLOGY

## 2025-05-22 PROCEDURE — 700102 HCHG RX REV CODE 250 W/ 637 OVERRIDE(OP): Performed by: STUDENT IN AN ORGANIZED HEALTH CARE EDUCATION/TRAINING PROGRAM

## 2025-05-22 PROCEDURE — 700101 HCHG RX REV CODE 250: Performed by: ANESTHESIOLOGY

## 2025-05-22 PROCEDURE — 700111 HCHG RX REV CODE 636 W/ 250 OVERRIDE (IP): Performed by: STUDENT IN AN ORGANIZED HEALTH CARE EDUCATION/TRAINING PROGRAM

## 2025-05-22 PROCEDURE — 700105 HCHG RX REV CODE 258: Performed by: STUDENT IN AN ORGANIZED HEALTH CARE EDUCATION/TRAINING PROGRAM

## 2025-05-22 PROCEDURE — 87102 FUNGUS ISOLATION CULTURE: CPT

## 2025-05-22 PROCEDURE — 36600 WITHDRAWAL OF ARTERIAL BLOOD: CPT

## 2025-05-22 PROCEDURE — 700101 HCHG RX REV CODE 250: Performed by: OTOLARYNGOLOGY

## 2025-05-22 PROCEDURE — 82803 BLOOD GASES ANY COMBINATION: CPT

## 2025-05-22 PROCEDURE — A9270 NON-COVERED ITEM OR SERVICE: HCPCS | Performed by: STUDENT IN AN ORGANIZED HEALTH CARE EDUCATION/TRAINING PROGRAM

## 2025-05-22 PROCEDURE — 700111 HCHG RX REV CODE 636 W/ 250 OVERRIDE (IP): Mod: JZ | Performed by: INTERNAL MEDICINE

## 2025-05-22 PROCEDURE — 700111 HCHG RX REV CODE 636 W/ 250 OVERRIDE (IP): Mod: JZ | Performed by: STUDENT IN AN ORGANIZED HEALTH CARE EDUCATION/TRAINING PROGRAM

## 2025-05-22 PROCEDURE — 160009 HCHG ANES TIME/MIN: Performed by: OTOLARYNGOLOGY

## 2025-05-22 PROCEDURE — 700111 HCHG RX REV CODE 636 W/ 250 OVERRIDE (IP): Mod: JZ | Performed by: ANESTHESIOLOGY

## 2025-05-22 PROCEDURE — 85027 COMPLETE CBC AUTOMATED: CPT

## 2025-05-22 PROCEDURE — 94002 VENT MGMT INPAT INIT DAY: CPT

## 2025-05-22 PROCEDURE — 84478 ASSAY OF TRIGLYCERIDES: CPT

## 2025-05-22 PROCEDURE — 99291 CRITICAL CARE FIRST HOUR: CPT | Performed by: INTERNAL MEDICINE

## 2025-05-22 PROCEDURE — 83605 ASSAY OF LACTIC ACID: CPT

## 2025-05-22 PROCEDURE — 160027 HCHG SURGERY MINUTES - 1ST 30 MINS LEVEL 2: Performed by: OTOLARYNGOLOGY

## 2025-05-22 RX ORDER — METHYLPREDNISOLONE SODIUM SUCCINATE 125 MG/2ML
62.5 INJECTION, POWDER, LYOPHILIZED, FOR SOLUTION INTRAMUSCULAR; INTRAVENOUS EVERY 8 HOURS
Status: DISCONTINUED | OUTPATIENT
Start: 2025-05-22 | End: 2025-05-23

## 2025-05-22 RX ORDER — AMPICILLIN AND SULBACTAM 2; 1 G/1; G/1
INJECTION, POWDER, FOR SOLUTION INTRAMUSCULAR; INTRAVENOUS PRN
Status: DISCONTINUED | OUTPATIENT
Start: 2025-05-22 | End: 2025-05-22 | Stop reason: SURG

## 2025-05-22 RX ORDER — LIDOCAINE HYDROCHLORIDE 20 MG/ML
INJECTION, SOLUTION EPIDURAL; INFILTRATION; INTRACAUDAL; PERINEURAL
Status: COMPLETED
Start: 2025-05-22 | End: 2025-05-22

## 2025-05-22 RX ORDER — HYDRALAZINE HYDROCHLORIDE 20 MG/ML
20 INJECTION INTRAMUSCULAR; INTRAVENOUS EVERY 6 HOURS PRN
Status: DISCONTINUED | OUTPATIENT
Start: 2025-05-22 | End: 2025-05-25 | Stop reason: HOSPADM

## 2025-05-22 RX ORDER — HYDROCODONE BITARTRATE AND ACETAMINOPHEN 5; 325 MG/1; MG/1
2 TABLET ORAL EVERY 4 HOURS PRN
Status: DISCONTINUED | OUTPATIENT
Start: 2025-05-22 | End: 2025-05-23

## 2025-05-22 RX ORDER — LIDOCAINE HYDROCHLORIDE 20 MG/ML
INJECTION, SOLUTION EPIDURAL; INFILTRATION; INTRACAUDAL; PERINEURAL PRN
Status: DISCONTINUED | OUTPATIENT
Start: 2025-05-22 | End: 2025-05-22 | Stop reason: SURG

## 2025-05-22 RX ORDER — LIDOCAINE HYDROCHLORIDE 20 MG/ML
5 INJECTION, SOLUTION EPIDURAL; INFILTRATION; INTRACAUDAL; PERINEURAL ONCE
Status: COMPLETED | OUTPATIENT
Start: 2025-05-22 | End: 2025-05-22

## 2025-05-22 RX ORDER — DEXAMETHASONE SODIUM PHOSPHATE 4 MG/ML
INJECTION, SOLUTION INTRA-ARTICULAR; INTRALESIONAL; INTRAMUSCULAR; INTRAVENOUS; SOFT TISSUE PRN
Status: DISCONTINUED | OUTPATIENT
Start: 2025-05-22 | End: 2025-05-22 | Stop reason: SURG

## 2025-05-22 RX ORDER — FAMOTIDINE 20 MG/1
20 TABLET, FILM COATED ORAL EVERY 12 HOURS
Status: DISCONTINUED | OUTPATIENT
Start: 2025-05-22 | End: 2025-05-24

## 2025-05-22 RX ORDER — ONDANSETRON 2 MG/ML
INJECTION INTRAMUSCULAR; INTRAVENOUS PRN
Status: DISCONTINUED | OUTPATIENT
Start: 2025-05-22 | End: 2025-05-22 | Stop reason: SURG

## 2025-05-22 RX ORDER — ROCURONIUM BROMIDE 10 MG/ML
INJECTION, SOLUTION INTRAVENOUS PRN
Status: DISCONTINUED | OUTPATIENT
Start: 2025-05-22 | End: 2025-05-22 | Stop reason: SURG

## 2025-05-22 RX ORDER — HYDROMORPHONE HYDROCHLORIDE 2 MG/ML
INJECTION, SOLUTION INTRAMUSCULAR; INTRAVENOUS; SUBCUTANEOUS PRN
Status: DISCONTINUED | OUTPATIENT
Start: 2025-05-22 | End: 2025-05-22 | Stop reason: SURG

## 2025-05-22 RX ORDER — LINEZOLID 2 MG/ML
600 INJECTION, SOLUTION INTRAVENOUS EVERY 12 HOURS
Status: DISCONTINUED | OUTPATIENT
Start: 2025-05-22 | End: 2025-05-24

## 2025-05-22 RX ORDER — AMOXICILLIN 250 MG
2 CAPSULE ORAL 2 TIMES DAILY
Status: DISCONTINUED | OUTPATIENT
Start: 2025-05-22 | End: 2025-05-23

## 2025-05-22 RX ORDER — POLYETHYLENE GLYCOL 3350 17 G/17G
1 POWDER, FOR SOLUTION ORAL
Status: DISCONTINUED | OUTPATIENT
Start: 2025-05-22 | End: 2025-05-23

## 2025-05-22 RX ORDER — DEXMEDETOMIDINE HYDROCHLORIDE 100 UG/ML
INJECTION, SOLUTION INTRAVENOUS PRN
Status: DISCONTINUED | OUTPATIENT
Start: 2025-05-22 | End: 2025-05-22 | Stop reason: SURG

## 2025-05-22 RX ORDER — SODIUM CHLORIDE, SODIUM LACTATE, POTASSIUM CHLORIDE, CALCIUM CHLORIDE 600; 310; 30; 20 MG/100ML; MG/100ML; MG/100ML; MG/100ML
INJECTION, SOLUTION INTRAVENOUS
Status: DISCONTINUED | OUTPATIENT
Start: 2025-05-22 | End: 2025-05-22 | Stop reason: SURG

## 2025-05-22 RX ORDER — HYDROMORPHONE HYDROCHLORIDE 1 MG/ML
1 INJECTION, SOLUTION INTRAMUSCULAR; INTRAVENOUS; SUBCUTANEOUS EVERY 4 HOURS PRN
Status: DISCONTINUED | OUTPATIENT
Start: 2025-05-22 | End: 2025-05-22

## 2025-05-22 RX ORDER — LIDOCAINE HYDROCHLORIDE 10 MG/ML
INJECTION, SOLUTION EPIDURAL; INFILTRATION; INTRACAUDAL; PERINEURAL
Status: DISPENSED
Start: 2025-05-22 | End: 2025-05-23

## 2025-05-22 RX ORDER — HYDROMORPHONE HYDROCHLORIDE 1 MG/ML
0.5 INJECTION, SOLUTION INTRAMUSCULAR; INTRAVENOUS; SUBCUTANEOUS
Status: DISCONTINUED | OUTPATIENT
Start: 2025-05-22 | End: 2025-05-24

## 2025-05-22 RX ORDER — BISACODYL 10 MG
10 SUPPOSITORY, RECTAL RECTAL
Status: DISCONTINUED | OUTPATIENT
Start: 2025-05-22 | End: 2025-05-23

## 2025-05-22 RX ORDER — LIDOCAINE HYDROCHLORIDE AND EPINEPHRINE 10; 10 MG/ML; UG/ML
INJECTION, SOLUTION INFILTRATION; PERINEURAL
Status: DISCONTINUED | OUTPATIENT
Start: 2025-05-22 | End: 2025-05-22 | Stop reason: HOSPADM

## 2025-05-22 RX ORDER — MIDAZOLAM HYDROCHLORIDE 1 MG/ML
INJECTION INTRAMUSCULAR; INTRAVENOUS PRN
Status: DISCONTINUED | OUTPATIENT
Start: 2025-05-22 | End: 2025-05-22 | Stop reason: SURG

## 2025-05-22 RX ORDER — HYDROMORPHONE HYDROCHLORIDE 1 MG/ML
1 INJECTION, SOLUTION INTRAMUSCULAR; INTRAVENOUS; SUBCUTANEOUS
Status: DISCONTINUED | OUTPATIENT
Start: 2025-05-22 | End: 2025-05-24

## 2025-05-22 RX ORDER — EPINEPHRINE 1 MG/ML(1)
AMPUL (ML) INJECTION
Status: DISPENSED
Start: 2025-05-22 | End: 2025-05-23

## 2025-05-22 RX ADMIN — AMPICILLIN SODIUM, SULBACTAM SODIUM 3 G: 2; 1 INJECTION, POWDER, FOR SOLUTION INTRAMUSCULAR; INTRAVENOUS at 17:06

## 2025-05-22 RX ADMIN — SODIUM CHLORIDE, POTASSIUM CHLORIDE, SODIUM LACTATE AND CALCIUM CHLORIDE: 600; 310; 30; 20 INJECTION, SOLUTION INTRAVENOUS at 03:46

## 2025-05-22 RX ADMIN — FAMOTIDINE 20 MG: 10 INJECTION, SOLUTION INTRAVENOUS at 17:04

## 2025-05-22 RX ADMIN — PROPOFOL 80 MCG/KG/MIN: 10 INJECTION, EMULSION INTRAVENOUS at 23:19

## 2025-05-22 RX ADMIN — PROPOFOL 50 MG: 10 INJECTION, EMULSION INTRAVENOUS at 12:47

## 2025-05-22 RX ADMIN — HYDRALAZINE HYDROCHLORIDE 20 MG: 20 INJECTION INTRAMUSCULAR; INTRAVENOUS at 09:54

## 2025-05-22 RX ADMIN — MORPHINE SULFATE 4 MG: 4 INJECTION, SOLUTION INTRAMUSCULAR; INTRAVENOUS at 08:53

## 2025-05-22 RX ADMIN — ONDANSETRON 4 MG: 2 INJECTION INTRAMUSCULAR; INTRAVENOUS at 13:10

## 2025-05-22 RX ADMIN — LINEZOLID 600 MG: 2 INJECTION, SOLUTION INTRAVENOUS at 15:03

## 2025-05-22 RX ADMIN — AMPICILLIN SODIUM, SULBACTAM SODIUM 3 G: 2; 1 INJECTION, POWDER, FOR SOLUTION INTRAMUSCULAR; INTRAVENOUS at 23:43

## 2025-05-22 RX ADMIN — MIDAZOLAM HYDROCHLORIDE 2 MG: 1 INJECTION, SOLUTION INTRAMUSCULAR; INTRAVENOUS at 12:39

## 2025-05-22 RX ADMIN — METHYLPREDNISOLONE SODIUM SUCCINATE 62.5 MG: 125 INJECTION, POWDER, FOR SOLUTION INTRAMUSCULAR; INTRAVENOUS at 23:44

## 2025-05-22 RX ADMIN — METHYLPREDNISOLONE SODIUM SUCCINATE 62.5 MG: 125 INJECTION, POWDER, FOR SOLUTION INTRAMUSCULAR; INTRAVENOUS at 15:25

## 2025-05-22 RX ADMIN — LABETALOL HYDROCHLORIDE 10 MG: 5 INJECTION, SOLUTION INTRAVENOUS at 08:01

## 2025-05-22 RX ADMIN — AMPICILLIN SODIUM, SULBACTAM SODIUM 3 G: 2; 1 INJECTION, POWDER, FOR SOLUTION INTRAMUSCULAR; INTRAVENOUS at 05:24

## 2025-05-22 RX ADMIN — SODIUM CHLORIDE, POTASSIUM CHLORIDE, SODIUM LACTATE AND CALCIUM CHLORIDE: 600; 310; 30; 20 INJECTION, SOLUTION INTRAVENOUS at 14:30

## 2025-05-22 RX ADMIN — AMPICILLIN SODIUM, SULBACTAM SODIUM 3 G: 2; 1 INJECTION, POWDER, FOR SOLUTION INTRAMUSCULAR; INTRAVENOUS at 11:20

## 2025-05-22 RX ADMIN — DEXMEDETOMIDINE 50 MCG: 100 INJECTION, SOLUTION INTRAVENOUS at 12:39

## 2025-05-22 RX ADMIN — PROPOFOL 50 MCG/KG/MIN: 10 INJECTION, EMULSION INTRAVENOUS at 14:11

## 2025-05-22 RX ADMIN — SODIUM CHLORIDE, POTASSIUM CHLORIDE, SODIUM LACTATE AND CALCIUM CHLORIDE: 600; 310; 30; 20 INJECTION, SOLUTION INTRAVENOUS at 12:37

## 2025-05-22 RX ADMIN — PROPOFOL 80 MCG/KG/MIN: 10 INJECTION, EMULSION INTRAVENOUS at 18:32

## 2025-05-22 RX ADMIN — LIDOCAINE HYDROCHLORIDE,EPINEPHRINE BITARTRATE 10 ML: 10; .01 INJECTION, SOLUTION INFILTRATION; PERINEURAL at 13:10

## 2025-05-22 RX ADMIN — HYDROMORPHONE HYDROCHLORIDE 1 MG: 1 INJECTION, SOLUTION INTRAMUSCULAR; INTRAVENOUS; SUBCUTANEOUS at 14:06

## 2025-05-22 RX ADMIN — HYDROCODONE BITARTRATE AND ACETAMINOPHEN 1 TABLET: 5; 325 TABLET ORAL at 02:55

## 2025-05-22 RX ADMIN — PROPOFOL 100 MG: 10 INJECTION, EMULSION INTRAVENOUS at 12:46

## 2025-05-22 RX ADMIN — ROCURONIUM BROMIDE 50 MG: 10 INJECTION INTRAVENOUS at 12:47

## 2025-05-22 RX ADMIN — LIDOCAINE HYDROCHLORIDE: 20 INJECTION, SOLUTION EPIDURAL; INFILTRATION; INTRACAUDAL; PERINEURAL at 12:19

## 2025-05-22 RX ADMIN — LIDOCAINE HYDROCHLORIDE 60 MG: 20 INJECTION, SOLUTION EPIDURAL; INFILTRATION; INTRACAUDAL; PERINEURAL at 12:46

## 2025-05-22 RX ADMIN — AMPICILLIN AND SULBACTAM 3 G: 1; 2 INJECTION, POWDER, FOR SOLUTION INTRAMUSCULAR; INTRAVENOUS at 13:00

## 2025-05-22 RX ADMIN — HYDROMORPHONE HYDROCHLORIDE 1 MG: 1 INJECTION, SOLUTION INTRAMUSCULAR; INTRAVENOUS; SUBCUTANEOUS at 23:23

## 2025-05-22 RX ADMIN — HYDROMORPHONE HYDROCHLORIDE 1 MG: 2 INJECTION INTRAMUSCULAR; INTRAVENOUS; SUBCUTANEOUS at 13:07

## 2025-05-22 RX ADMIN — DEXAMETHASONE SODIUM PHOSPHATE 8 MG: 4 INJECTION INTRA-ARTICULAR; INTRALESIONAL; INTRAMUSCULAR; INTRAVENOUS; SOFT TISSUE at 12:49

## 2025-05-22 RX ADMIN — HYDROMORPHONE HYDROCHLORIDE 1 MG: 1 INJECTION, SOLUTION INTRAMUSCULAR; INTRAVENOUS; SUBCUTANEOUS at 18:39

## 2025-05-22 RX ADMIN — HYDROMORPHONE HYDROCHLORIDE 1 MG: 1 INJECTION, SOLUTION INTRAMUSCULAR; INTRAVENOUS; SUBCUTANEOUS at 10:42

## 2025-05-22 RX ADMIN — PILOCARPINE HYDROCHLORIDE 5 MG: 5 TABLET, FILM COATED ORAL at 05:21

## 2025-05-22 RX ADMIN — PROPOFOL 25 MCG/KG/MIN: 10 INJECTION, EMULSION INTRAVENOUS at 13:28

## 2025-05-22 RX ADMIN — ONDANSETRON 4 MG: 2 INJECTION INTRAMUSCULAR; INTRAVENOUS at 06:09

## 2025-05-22 RX ADMIN — MORPHINE SULFATE 4 MG: 4 INJECTION, SOLUTION INTRAMUSCULAR; INTRAVENOUS at 03:41

## 2025-05-22 ASSESSMENT — PAIN DESCRIPTION - PAIN TYPE
TYPE: ACUTE PAIN

## 2025-05-22 ASSESSMENT — ENCOUNTER SYMPTOMS
HEADACHES: 0
FALLS: 0
LOSS OF CONSCIOUSNESS: 0
CHILLS: 0
DIZZINESS: 0
COUGH: 0
EYE PAIN: 0
INSOMNIA: 0
VOMITING: 0
NAUSEA: 0
BLURRED VISION: 0
ABDOMINAL PAIN: 0
FEVER: 0
BACK PAIN: 0
FOCAL WEAKNESS: 0
SENSORY CHANGE: 0
SHORTNESS OF BREATH: 0
PALPITATIONS: 0

## 2025-05-22 ASSESSMENT — LIFESTYLE VARIABLES: SUBSTANCE_ABUSE: 0

## 2025-05-22 ASSESSMENT — FIBROSIS 4 INDEX: FIB4 SCORE: 0.86

## 2025-05-22 NOTE — PROGRESS NOTES
4 Eyes Skin Assessment Completed by Jacky RN and GISELA Courtney.    Skin assessment is primarily focused on high risk bony prominences. Pay special attention to skin beneath and around medical devices, high risk bony prominences, skin to skin areas and areas where the patient lacks sensation to feel pain and areas where the patient previously had breakdown.     Head (Occipital):  Edema   Ears (Under Medical Devices): WDL   Nose (Under Medical Devices): WDL   Mouth:  Edema and swelling to tongue   Neck: Red, Bleeding, Incision, and Penrose drain to neck surgical site, swelling   Breast/Chest:  WDL   Shoulder Blades:  WDL   Spine:   WDL   (R) Arm/Elbow/Hand: WDL   (L) Arm/Elbow/Hand: Skin Tear   Abdomen: WDL   Pannus/Groin:  WDL   Sacrum/Coccyx:   WDL   (R) Ischial Tuberosity (Sit Bones):  WDL   (L) Ischial Tuberosity (Sit Bones):  WDL   (R) Leg:  WDL   (L) Leg:  WDL   (R) Heel:  WDL   (R) Foot/Toe: WDL   (L) Heel: WDL   (L) Foot/Toe:  WDL       DEVICES IN USE:   Respiratory Devices:  ET Tube and Pulse ox  Feeding Devices:  N/A   Lines & BP Monitoring Devices:  Peripheral IV    Orthopedic Devices:  N/A  Miscellaneous Devices:  Drains, Telemetry monitor, Ribera, SCDs, and Soft restraints    PROTOCOL INTERVENTIONS:   ICU Low Airloss Bed:  Applied this assessment  Offloading Dressing - Sacrum:  Applied this assessment  Float Heels with Pillows:  Applied this assessment  Q2 Turns with Wedges:  Applied this assessment  Glide Sheet:  Applied this assessment  Barrier Paste:  Applied this assessment  Ribera:  Already in place    WOUND PHOTOS:   Completed and in EPIC     WOUND CONSULT:   N/A, no advanced wound care needs identified

## 2025-05-22 NOTE — PROGRESS NOTES
[facility] Medicine Daily Progress Note    Date of Service  5/22/2025    Chief Complaint  Mare Eason is a 25 y.o. female admitted 5/20/2025 with facial swelling.    Hospital Course    Mare Eason is a 25 y.o. female without significant chronic medical conditions who presented 5/20/2025 with oral/face swelling.     Patient presented to [facility] today for swelling to submandibular area making it difficult for her to speak or swallow, swelling has been progressive over the past 3 days.  No distress, saturating well on room air, tolerating oral secretions.  She had a CT which showed enlarged right submandibular gland with infiltrative edema in the submandibular space extending to the right parotid inferiorly to the hyoid and piriform sinus as well as to the right vallecula adjacent to the epiglottis.  WBC 17, lactate was 3, she was given Decadron 10 mg, 3 g of Unasyn, 2 L IV fluid, Toradol, morphine transferred to [facility].  No headache or vision changes, no chest pain dyspnea cough vomiting abdominal pain dysuria diarrhea.     In the ED she is afebrile with normal pulse and respiratory rate systolic blood pressure 150s to 170s pulse ox 95 to 98% on room air.  Leukocytosis 21.8, bicarb 19, calcium 8.4 alk phos 113 globulin 3.7 lactate 1.4.  Blood cultures were obtained.  ERP discussed with ENT Dr. Winchester recommends admission for IV antibiotics, warm compresses, sialagogues, fluids, will see patient in consult.  Patient subsequently referred to hospitalist for admission.    Interval Problem Update  Patient with worsening throat swelling this morning.  Tongue is protruding slightly out of mouth, she reports ongoing throat pain as well.  Hypertensive, IV prn's given.  Patient with increased pain, pain regimen adjusted.  ENT concerned about Ludwigs angina. Plan to take patient to the OR emergently for I+D.  Plan to monitor patient in the ICU post operatively given tenuous respiratory  status.  Continue IV antibiotics, restarting IV steroids.  Appreciate ENT plan of care.      I have discussed this patient's plan of care and discharge plan at IDT rounds today with Case Management, Nursing, Nursing leadership, and other members of the IDT team.    Consultants/Specialty  ENT    Code Status  Full Code    Disposition  Medically Cleared  I have placed the appropriate orders for post-discharge needs.    Review of Systems  Review of Systems   Constitutional:  Negative for chills and fever.   Eyes:  Negative for blurred vision and pain.   Respiratory:  Negative for cough and shortness of breath.    Cardiovascular:  Negative for chest pain, palpitations and leg swelling.   Gastrointestinal:  Negative for abdominal pain, nausea and vomiting.   Genitourinary:  Negative for dysuria and urgency.   Musculoskeletal:  Negative for back pain and falls.   Skin:  Negative for itching and rash.   Neurological:  Negative for dizziness, sensory change, focal weakness, loss of consciousness and headaches.   Psychiatric/Behavioral:  Negative for substance abuse. The patient does not have insomnia.         Physical Exam  Temp:  [35.9 °C (96.7 °F)-37.3 °C (99.2 °F)] 35.9 °C (96.7 °F)  Pulse:  [51-89] 72  Resp:  [12-20] 12  BP: (140-200)/() 147/95  SpO2:  [94 %-100 %] 100 %    Physical Exam  Vitals reviewed.   Constitutional:       General: She is not in acute distress.     Appearance: She is not diaphoretic.   HENT:      Head: Normocephalic and atraumatic.      Right Ear: External ear normal.      Left Ear: External ear normal.      Nose: Nose normal. No congestion.      Mouth/Throat:      Pharynx: No oropharyngeal exudate or posterior oropharyngeal erythema.      Comments: Submandibular swelling on right crossing midline to under chin  Eyes:      Extraocular Movements: Extraocular movements intact.      Pupils: Pupils are equal, round, and reactive to light.   Cardiovascular:      Rate and Rhythm: Normal rate and  regular rhythm.   Pulmonary:      Effort: Pulmonary effort is normal. No respiratory distress.      Breath sounds: Normal breath sounds. No stridor. No wheezing, rhonchi or rales.   Abdominal:      General: Bowel sounds are normal. There is no distension.      Palpations: Abdomen is soft.      Tenderness: There is no abdominal tenderness.   Musculoskeletal:         General: No swelling. Normal range of motion.      Cervical back: Normal range of motion and neck supple.      Right lower leg: No edema.      Left lower leg: No edema.   Skin:     General: Skin is warm and dry.   Neurological:      General: No focal deficit present.      Mental Status: She is alert and oriented to person, place, and time.      Cranial Nerves: No cranial nerve deficit.      Motor: No weakness.   Psychiatric:         Mood and Affect: Mood normal.         Behavior: Behavior normal.         Fluids    Intake/Output Summary (Last 24 hours) at 5/22/2025 1521  Last data filed at 5/22/2025 1313  Gross per 24 hour   Intake 1900 ml   Output 25 ml   Net 1875 ml        Laboratory  Recent Labs     05/20/25  1539 05/21/25  0107 05/22/25  0101   WBC 21.8* 19.7* 15.2*   RBC 4.26 3.88* 3.76*   HEMOGLOBIN 12.1 11.0* 10.7*   HEMATOCRIT 37.4 34.1* 32.9*   MCV 87.8 87.9 87.5   MCH 28.4 28.4 28.5   MCHC 32.4 32.3 32.5   RDW 46.5 47.0 46.9   PLATELETCT 192 174 167   MPV 11.3 11.0 11.7     Recent Labs     05/20/25  1539 05/21/25  0107 05/22/25  0101   SODIUM 142 140 141   POTASSIUM 3.6 3.6 3.7   CHLORIDE 108 106 107   CO2 19* 22 23   GLUCOSE 126* 115* 94   BUN 7* 8 12   CREATININE 0.78 0.78 0.68   CALCIUM 8.3* 8.4* 8.4*     Recent Labs     05/21/25  0107   INR 1.08               Imaging  DX-CHEST-LIMITED (1 VIEW)   Final Result      1.  Well-positioned ETT.   2.  No focal consolidation or pleural effusions.      CT-OUTSIDE IMAGES-CT NECK   Final Result           Assessment/Plan  * Sialadenitis- (present on admission)  Assessment & Plan  ERP consulted ENT,  "recommends antibiotics, sialagogues, will see patient in consult.  Mild odynophagia, tolerating oral secretions, no respiratory compromise.  Unasyn and pilocarpine ordered.  Steroids ordered for significant swelling and odynophagia. Patient states steroids making swelling worse, steroids stopped, ibuprofen ordered  Warm compresses, IV fluids.  CT at OSH shows \"enlarged right submandibular gland with infiltrative edema in right submandibular space extending superiorly to the level of the right parotid gland inferiorly down to the right hyoid bone and piriform sinus area, edema also extends into the right vallecula adjacent to the epiglottis, fluid density noted in the right tongue base related to probable obstruction of the right submandibular duct, no drainable fluid collection or abscess.\"    Patient with worsening throat and mouth swelling today  Plan for emergent I+D this morning by ENT  Plan to monitor in ICU post operatively  Continue IV antibiotics, steroids, fluids    Hypocalcemia  Assessment & Plan  Mildly low 8.4, asymptomatic.  Encourage p.o. intake, recheck level in the morning.    Metabolic acidosis  Assessment & Plan  2/2 infectious process, bicarb 19, lactate 1.4 here, was 3 at OSH.  IV fluids, antibiotics for infection, recheck labs in the morning.         VTE prophylaxis: VTE Selection    I have performed a physical exam and reviewed and updated ROS and Plan today (5/22/2025). In review of yesterday's note (5/21/2025), there are no changes except as documented above.        "

## 2025-05-22 NOTE — CONSULTS
DATE OF SERVICE:  05/21/2025     PRINCIPAL COMPLAINT:  Neck and mouth swelling.     HISTORY OF PRESENT ILLNESS:  This is a 25-year-old female who was seen at [facility] and noted to have enlarged submandibular gland swelling and mouth   swelling.  This has been going on for the past few days.  She was transferred   to [facility] for ENT evaluation.  At that time, she did have a CT that showed   right submandibular gland enlargement with diffuse phlegmon infiltration of   the submandibular space extending to the parotid and the hyoid bone and the   piriformis sinus.  She was given Decadron, Unasyn, and pain medicines at that   time.     PAST MEDICAL HISTORY:  Otherwise noncontributory.     SOCIAL HISTORY:  She is not a tobacco smoker.     MEDICATIONS:  She has been taking ibuprofen.     PHYSICAL EXAMINATION:  GENERAL:  On initial exam, I saw her in her room.  She is lying in the bed   complaining of right facial pain.  She said increased teeth pain.  VITAL SIGNS: Her vital signs currently, she is currently afebrile with a   temperature of 97.6, pulse of 58, blood pressure 153/106.  She is saturating   98% on room air.  HEENT:  On physical exam, both ears are grossly normal.  The nose is patent.    The mouth shows pretty good opening.  She has some swelling in the floor of   the mouth that is tender as well as she complains of her right lower teeth,   although I see no obvious caries or breakdown.  NECK:  The right neck is very tender and swollen as well as submandibular   gland with no  obvious fluctuance at this time.  The left side is soft.     IMAGING:  I did review the CAT scan that shows sialadenitis with diffuse   phlegmon.     IMPRESSION:  Sialadenitis.     RECOMMENDATIONS:  I do recommend continued hydration, warm compresses,   steroids, and IV antibiotics.  If anything worsens, she may need a repeat   imaging evaluation.  I will keep an eye on her while she is in the  [facility].        ______________________________  MD CHRISTINE Hollins/ZUHAIR    DD:  05/21/2025 16:17  DT:  05/21/2025 17:36    Job#:  [id number]

## 2025-05-22 NOTE — CONSULTS
Critical Care Consultation    Date of consult: 2025    Referring Physician  Brian Jiménez M.D.    Reason for Consultation  Submandibler abscess and respiratory failure requiring mechanical ventilation    History of Presenting Illness  25 y.o. female who presented 2025 with no chronic medical conditions, + tobacco abuse. That has presented to [facility]. Found to have submandibular abscess down to the right vallecula and transferred to [facility] for ENT consultation, patient managed on floor but failed conservative management and was taken to OR today By Dr Winchester for I and D. She was left intubated with a 6.5 ET and will required post operative monitoring in ICU. Hx is limited secondary to being intubated.      Code Status  Full Code    Review of Systems  Review of Systems   Unable to perform ROS: Intubated       Past Medical History   has a past medical history of Encounter for other contraceptive management (2024).    She has no past medical history of Addisons disease (HCC), Adrenal disorder (HCC), Allergy, Anemia, Anxiety, Arrhythmia, Arthritis, Asthma, Cancer (HCC), Cataract, CHF (congestive heart failure) (HCC), Clotting disorder (HCC), COPD (chronic obstructive pulmonary disease) (HCC), Cushings syndrome (HCC), Depression, Diabetes (HCC), Diabetic neuropathy (HCC), Glaucoma, Goiter, Heart attack (HCC), Heart murmur, HIV (human immunodeficiency virus infection) (HCC), Hyperlipidemia, Hypertension, IBD (inflammatory bowel disease), Kidney disease, Meningitis, Migraine, Muscle disorder, Osteoporosis, Parathyroid disorder (HCC), Pituitary disease (HCC), Pulmonary emphysema (HCC), Seizure (HCC), Sickle cell disease (HCC), Stroke (HCC), Substance abuse (HCC), Thyroid disease, Tuberculosis, or Urinary tract infection.    Surgical History   has a past surgical history that includes pr  delivery only (N/A, 2020) and repeat c section (N/A, 10/18/2023).    Family History  family  history includes No Known Problems in her brother, mother, and sister.    Social History   reports that she has never smoked. She has never used smokeless tobacco. She reports that she does not currently use alcohol. She reports current drug use. Drugs: Marijuana and Inhaled.    Medications  Home Medications       Reviewed by Glo Chen (Pharmacy Tech) on 05/20/25 at 1530  Med List Status: Complete     Medication Last Dose Status   ibuprofen (MOTRIN) 200 MG Tab 5/20/2025 Active                  Audit from Redirected Encounters    **Home medications have not yet been reviewed for this encounter**       Current Medications[1]    Allergies  Allergies[2]    Vital Signs last 24 hours  Temp:  [35.9 °C (96.7 °F)-37.3 °C (99.2 °F)] 35.9 °C (96.7 °F)  Pulse:  [51-89] 72  Resp:  [12-20] 12  BP: (140-200)/() 147/95  SpO2:  [94 %-100 %] 100 %    Physical Exam  Physical Exam  Vitals and nursing note reviewed.   Constitutional:       General: She is not in acute distress.     Appearance: She is not ill-appearing.      Comments: Young female intubated on ventilator   HENT:      Head: Normocephalic.      Mouth/Throat:      Mouth: Mucous membranes are moist.      Comments: Tongue large and sticking out with ET in place, fullness to large mandible with penrose drain in place.   Eyes:      Pupils: Pupils are equal, round, and reactive to light.   Cardiovascular:      Rate and Rhythm: Normal rate.      Heart sounds: No murmur heard.  Pulmonary:      Effort: No respiratory distress.      Breath sounds: No stridor. No wheezing or rhonchi.   Abdominal:      General: There is no distension.      Palpations: There is no mass.      Tenderness: There is no abdominal tenderness.      Hernia: No hernia is present.   Musculoskeletal:         General: No swelling or tenderness.   Skin:     Coloration: Skin is not jaundiced or pale.   Neurological:      Comments: Sedated immediately post operatively         Fluids    Intake/Output  Summary (Last 24 hours) at 5/22/2025 0748  Last data filed at 5/22/2025 1313  Gross per 24 hour   Intake 1900 ml   Output 25 ml   Net 1875 ml       Laboratory  Recent Results (from the past 48 hours)   Blood Culture - Draw one from central line and one from peripheral site    Collection Time: 05/20/25  3:15 PM    Specimen: Peripheral; Blood   Result Value Ref Range    Significant Indicator NEG     Source BLD     Site PERIPHERAL     Culture Result       No Growth  Note: Blood cultures are incubated for 5 days and  are monitored continuously.Positive blood cultures  are called to the RN and reported as soon as  they are identified.     Lactic Acid    Collection Time: 05/20/25  3:39 PM   Result Value Ref Range    Lactic Acid 1.4 0.5 - 2.0 mmol/L   CBC with Differential    Collection Time: 05/20/25  3:39 PM   Result Value Ref Range    WBC 21.8 (H) 4.8 - 10.8 K/uL    RBC 4.26 4.20 - 5.40 M/uL    Hemoglobin 12.1 12.0 - 16.0 g/dL    Hematocrit 37.4 37.0 - 47.0 %    MCV 87.8 81.4 - 97.8 fL    MCH 28.4 27.0 - 33.0 pg    MCHC 32.4 32.2 - 35.5 g/dL    RDW 46.5 35.9 - 50.0 fL    Platelet Count 192 164 - 446 K/uL    MPV 11.3 9.0 - 12.9 fL    Neutrophils-Polys 96.50 (H) 44.00 - 72.00 %    Lymphocytes 2.60 (L) 22.00 - 41.00 %    Monocytes 0.90 0.00 - 13.40 %    Eosinophils 0.00 0.00 - 6.90 %    Basophils 0.00 0.00 - 1.80 %    Nucleated RBC 0.00 0.00 - 0.20 /100 WBC    Neutrophils (Absolute) 21.04 (H) 1.82 - 7.42 K/uL    Lymphs (Absolute) 0.57 (L) 1.00 - 4.80 K/uL    Monos (Absolute) 0.20 0.00 - 0.85 K/uL    Eos (Absolute) 0.00 0.00 - 0.51 K/uL    Baso (Absolute) 0.00 0.00 - 0.12 K/uL    NRBC (Absolute) 0.00 K/uL   Complete Metabolic Panel    Collection Time: 05/20/25  3:39 PM   Result Value Ref Range    Sodium 142 135 - 145 mmol/L    Potassium 3.6 3.6 - 5.5 mmol/L    Chloride 108 96 - 112 mmol/L    Co2 19 (L) 20 - 33 mmol/L    Anion Gap 15.0 7.0 - 16.0    Glucose 126 (H) 65 - 99 mg/dL    Bun 7 (L) 8 - 22 mg/dL    Creatinine 0.78  0.50 - 1.40 mg/dL    Calcium 8.3 (L) 8.5 - 10.5 mg/dL    Correct Calcium 8.4 (L) 8.5 - 10.5 mg/dL    AST(SGOT) 25 12 - 45 U/L    ALT(SGPT) 17 2 - 50 U/L    Alkaline Phosphatase 113 (H) 30 - 99 U/L    Total Bilirubin 0.4 0.1 - 1.5 mg/dL    Albumin 3.9 3.2 - 4.9 g/dL    Total Protein 7.6 6.0 - 8.2 g/dL    Globulin 3.7 (H) 1.9 - 3.5 g/dL    A-G Ratio 1.1 g/dL   ESTIMATED GFR    Collection Time: 05/20/25  3:39 PM   Result Value Ref Range    GFR (CKD-EPI) 108 >60 mL/min/1.73 m 2   DIFFERENTIAL MANUAL    Collection Time: 05/20/25  3:39 PM   Result Value Ref Range    Manual Diff Status PERFORMED    PERIPHERAL SMEAR REVIEW    Collection Time: 05/20/25  3:39 PM   Result Value Ref Range    Peripheral Smear Review see below    PLATELET ESTIMATE    Collection Time: 05/20/25  3:39 PM   Result Value Ref Range    Plt Estimation Normal    MORPHOLOGY    Collection Time: 05/20/25  3:39 PM   Result Value Ref Range    RBC Morphology Normal    Blood Culture - Draw one from central line and one from peripheral site    Collection Time: 05/20/25  3:45 PM    Specimen: Line; Blood   Result Value Ref Range    Significant Indicator NEG     Source BLD     Site Peripheral     Culture Result       No Growth  Note: Blood cultures are incubated for 5 days and  are monitored continuously.Positive blood cultures  are called to the RN and reported as soon as  they are identified.     Prothrombin time (INR)    Collection Time: 05/21/25  1:07 AM   Result Value Ref Range    PT 14.1 12.0 - 14.6 sec    INR 1.08 0.87 - 1.13   CBC with Differential    Collection Time: 05/21/25  1:07 AM   Result Value Ref Range    WBC 19.7 (H) 4.8 - 10.8 K/uL    RBC 3.88 (L) 4.20 - 5.40 M/uL    Hemoglobin 11.0 (L) 12.0 - 16.0 g/dL    Hematocrit 34.1 (L) 37.0 - 47.0 %    MCV 87.9 81.4 - 97.8 fL    MCH 28.4 27.0 - 33.0 pg    MCHC 32.3 32.2 - 35.5 g/dL    RDW 47.0 35.9 - 50.0 fL    Platelet Count 174 164 - 446 K/uL    MPV 11.0 9.0 - 12.9 fL    Neutrophils-Polys 89.60 (H) 44.00 -  72.00 %    Lymphocytes 4.00 (L) 22.00 - 41.00 %    Monocytes 4.40 0.00 - 13.40 %    Eosinophils 0.00 0.00 - 6.90 %    Basophils 0.20 0.00 - 1.80 %    Immature Granulocytes 1.80 (H) 0.00 - 0.90 %    Nucleated RBC 0.00 0.00 - 0.20 /100 WBC    Neutrophils (Absolute) 17.69 (H) 1.82 - 7.42 K/uL    Lymphs (Absolute) 0.79 (L) 1.00 - 4.80 K/uL    Monos (Absolute) 0.86 (H) 0.00 - 0.85 K/uL    Eos (Absolute) 0.00 0.00 - 0.51 K/uL    Baso (Absolute) 0.03 0.00 - 0.12 K/uL    Immature Granulocytes (abs) 0.35 (H) 0.00 - 0.11 K/uL    NRBC (Absolute) 0.00 K/uL   Comp Metabolic Panel (CMP)    Collection Time: 05/21/25  1:07 AM   Result Value Ref Range    Sodium 140 135 - 145 mmol/L    Potassium 3.6 3.6 - 5.5 mmol/L    Chloride 106 96 - 112 mmol/L    Co2 22 20 - 33 mmol/L    Anion Gap 12.0 7.0 - 16.0    Glucose 115 (H) 65 - 99 mg/dL    Bun 8 8 - 22 mg/dL    Creatinine 0.78 0.50 - 1.40 mg/dL    Calcium 8.4 (L) 8.5 - 10.5 mg/dL    Correct Calcium 8.8 8.5 - 10.5 mg/dL    AST(SGOT) 23 12 - 45 U/L    ALT(SGPT) 16 2 - 50 U/L    Alkaline Phosphatase 93 30 - 99 U/L    Total Bilirubin 0.3 0.1 - 1.5 mg/dL    Albumin 3.5 3.2 - 4.9 g/dL    Total Protein 6.6 6.0 - 8.2 g/dL    Globulin 3.1 1.9 - 3.5 g/dL    A-G Ratio 1.1 g/dL   Magnesium    Collection Time: 05/21/25  1:07 AM   Result Value Ref Range    Magnesium 1.9 1.5 - 2.5 mg/dL   ESTIMATED GFR    Collection Time: 05/21/25  1:07 AM   Result Value Ref Range    GFR (CKD-EPI) 108 >60 mL/min/1.73 m 2   CBC WITHOUT DIFFERENTIAL    Collection Time: 05/22/25  1:01 AM   Result Value Ref Range    WBC 15.2 (H) 4.8 - 10.8 K/uL    RBC 3.76 (L) 4.20 - 5.40 M/uL    Hemoglobin 10.7 (L) 12.0 - 16.0 g/dL    Hematocrit 32.9 (L) 37.0 - 47.0 %    MCV 87.5 81.4 - 97.8 fL    MCH 28.5 27.0 - 33.0 pg    MCHC 32.5 32.2 - 35.5 g/dL    RDW 46.9 35.9 - 50.0 fL    Platelet Count 167 164 - 446 K/uL    MPV 11.7 9.0 - 12.9 fL   Basic Metabolic Panel    Collection Time: 05/22/25  1:01 AM   Result Value Ref Range    Sodium 141  135 - 145 mmol/L    Potassium 3.7 3.6 - 5.5 mmol/L    Chloride 107 96 - 112 mmol/L    Co2 23 20 - 33 mmol/L    Glucose 94 65 - 99 mg/dL    Bun 12 8 - 22 mg/dL    Creatinine 0.68 0.50 - 1.40 mg/dL    Calcium 8.4 (L) 8.5 - 10.5 mg/dL    Anion Gap 11.0 7.0 - 16.0   ESTIMATED GFR    Collection Time: 05/22/25  1:01 AM   Result Value Ref Range    GFR (CKD-EPI) 124 >60 mL/min/1.73 m 2   CULTURE WOUND W/ GRAM STAIN    Collection Time: 05/22/25  1:00 PM    Specimen: Wound   Result Value Ref Range    Significant Indicator NEG     Source WND     Site Neck Abscess     Culture Result -     Gram Stain Result -    Fungal Culture    Collection Time: 05/22/25  1:00 PM    Specimen: Wound   Result Value Ref Range    Significant Indicator NEG     Source WND     Site Neck Abscess     Culture Result -     Fungal Smear Results -    Anaerobic Culture    Collection Time: 05/22/25  1:00 PM    Specimen: Wound   Result Value Ref Range    Significant Indicator NEG     Source WND     Site Neck Abscess     Culture Result -        Imaging  DX-CHEST-LIMITED (1 VIEW)   Final Result      1.  Well-positioned ETT.   2.  No focal consolidation or pleural effusions.      CT-OUTSIDE IMAGES-CT NECK   Final Result          Assessment/Plan  * Sialadenitis- (present on admission)  Assessment & Plan  Progressively worsening requiring surgery 5/22 By Dr Winchester   Appreciate ENT recommendation  Follow up cultures  Continue Salagen per ENT  Continue Unasyn add zyvox as MRSA needs to be covered with obstructed stones abscess.   Check MRSA nares  Continue steroids    Encounter for weaning from ventilator (HCC)  Assessment & Plan  Intubated date: 5/22 with glide scope in OR w/ 6.5 ET  Goal saturation > 88%  Monitor ventilator waveforms and titrate flow/peep and volumes according.   Lung protective ventilation strategy w/ A-F bundle  CXR: monitor lung volumes and tube/line placement  VAP bundle prevention, oral care, post pyloric feeding  Head of bed > 30 degree  GI  prophylaxis  Daily awakening and SBT trials unless contraindicated  Monitor for liberation  Respiratory treatments: prn    Will keep sedated for airway protection with daily accessment for safe trial of extubation          Discussed patient condition and risk of morbidity and/or mortality with RN, RT, Pharmacy, Charge nurse / hot rounds, Patient, and ENT.      The patient remains critically ill from airway monitor and respiratory support and titration of ventilator.  Critical care time = 50 minutes in directly providing and coordinating critical care and extensive data review.  No time overlap and excludes procedures.             [1]   Current Facility-Administered Medications   Medication Dose Route Frequency Provider Last Rate Last Admin    hydrALAZINE (Apresoline) injection 20 mg  20 mg Intravenous Q6HRS PRN Alhaji Booth M.D.   20 mg at 05/22/25 0954    HYDROcodone-acetaminophen (Norco) 5-325 MG per tablet 2 Tablet  2 Tablet Oral Q4HRS PRN Alhaji Booth M.D.        HYDROmorphone (Dilaudid) injection 1 mg  1 mg Intravenous Q4HRS PRN Alhaji Booth M.D.   1 mg at 05/22/25 1042    LIDOCAINE HCL (PF) 1 % INJ SOLN             EPINEPHRINE HCL 1 MG/ML INJ SOLN (WRAPPER)             Respiratory Therapy Consult   Nebulization Continuous RT Brian White M.D.        famotidine (Pepcid) tablet 20 mg  20 mg Enteral Tube Q12HRS Brian White M.D.        Or    famotidine (Pepcid) injection 20 mg  20 mg Intravenous Q12HRS Brian White M.D.        senna-docusate (Pericolace Or Senokot S) 8.6-50 MG per tablet 2 Tablet  2 Tablet Enteral Tube BID Brian White M.D.        And    polyethylene glycol/lytes (Miralax) Packet 1 Packet  1 Packet Enteral Tube QDAY PRN Brian White M.D.        And    magnesium hydroxide (Milk Of Magnesia) suspension 30 mL  30 mL Enteral Tube QDAY PRN Brian White M.D.        And    bisacodyl (Dulcolax) suppository 10 mg  10 mg Rectal QDAY PRN Brian White M.D. MD  Alert...ICU Electrolyte Replacement per Pharmacy   Other PHARMACY TO DOSE Brian White M.D.        lidocaine (Xylocaine) 1 % injection 2 mL  2 mL Tracheal Tube Q30 MIN PRN Brian White M.D.        HYDROmorphone (Dilaudid) injection 0.5 mg  0.5 mg Intravenous Q HOUR PRN Brian White M.D.        Or    HYDROmorphone (Dilaudid) injection 1 mg  1 mg Intravenous Q HOUR PRN Brian White M.D.   1 mg at 05/22/25 1406    propofol (DIPRIVAN) injection  0-80 mcg/kg/min (Ideal) Intravenous Continuous Brian White M.D. 24 mL/hr at 05/22/25 1417 80 mcg/kg/min at 05/22/25 1417    Linezolid (Zyvox) premix 600 mg  600 mg Intravenous Q12HRS Brian White M.D.        menthol (Halls) lozenge 1 Lozenge  1 Lozenge Oral PRN Alhjai Booth M.D.        diphenhydrAMINE (Benadryl) tablet/capsule 25 mg  25 mg Oral Q6HRS PRN Gianna Quiros, A.P.R.N.   25 mg at 05/21/25 2023    lactated ringers infusion   Intravenous Continuous Ramone Doyle M.D. 100 mL/hr at 05/22/25 1430 New Bag at 05/22/25 1430    acetaminophen (Tylenol) tablet 650 mg  650 mg Oral Q6HRS PRN Ramone Doyle M.D.        ampicillin/sulbactam (Unasyn) 3 g in  mL IVPB  3 g Intravenous Q6HRS Ramone Doyle M.D. 200 mL/hr at 05/22/25 1120 3 g at 05/22/25 1120    ondansetron (Zofran) syringe/vial injection 4 mg  4 mg Intravenous Q4HRS PRN Ramone Doyle M.D.   4 mg at 05/22/25 0609    ondansetron (Zofran ODT) dispertab 4 mg  4 mg Oral Q4HRS PRN Ramone Doyle M.D.        promethazine (Phenergan) tablet 12.5-25 mg  12.5-25 mg Oral Q4HRS PRN Ramone Doyle M.D.        promethazine (Phenergan) suppository 12.5-25 mg  12.5-25 mg Rectal Q4HRS PRN Ramone Doyle M.D.        prochlorperazine (Compazine) injection 5-10 mg  5-10 mg Intravenous Q4HRS PRN Ramone Dolye M.D.        senna-docusate (Pericolace Or Senokot S) 8.6-50 MG per tablet 2 Tablet  2 Tablet Oral Q EVENING Ramone Doyle M.D.        And    polyethylene  glycol/lytes (Miralax) Packet 1 Packet  1 Packet Oral QDAY PRN Ramone Doyle M.D.        labetalol (Normodyne/Trandate) injection 10 mg  10 mg Intravenous Q4HRS PRN Ramone Doyle M.D.   10 mg at 05/22/25 0801    pilocarpine (Salagen) tablet 5 mg  5 mg Oral TID Ramone Doyle M.D.   5 mg at 05/22/25 0521   [2] No Known Allergies

## 2025-05-22 NOTE — PROGRESS NOTES
Received report from day shift RN and assumed pt care at 1900. Patient assessment completed. Patient is A&Ox4 and on RA. Patient report pain of 0   /10. Patient reporting increased tongue swelling. Patient medicated per MAR. Pt running LR at 100 mL/hr. Bed locked and in the lowest position. Call light within reach. Plan of care discussed and Patient expresses no further needs at this time.

## 2025-05-22 NOTE — ANESTHESIA PREPROCEDURE EVALUATION
Case: [id number] Date/Time: 05/22/25 1201    Procedure: INCISION AND DRAINAGE    Location: [facility] ROOM 22 / SURGERY SAME DAY [facility]    Surgeons: Tamanna Winchester M.D.          Siloadenitis with worsening airway pain/edema/compromise.    Relevant Problems   No relevant active problems       Physical Exam    Airway - unable to assess  TM distance: >3 FB  Neck ROM: limited    Comments: Limited airway exam due to pain and swelling     Cardiovascular - normal exam  Rhythm: regular  Rate: normal    (-) murmur     Dental - normal exam           Pulmonary - normal examBreath sounds clear to auscultation     Abdominal    Neurological - normal exam                   Anesthesia Plan    ASA 1- EMERGENT   ASA physical status emergent criteria: impending airway compromise    Plan - general       Airway plan will be ETT          Induction: intravenous    Postoperative Plan: Postoperative administration of opioids is intended.    Pertinent diagnostic labs and testing reviewed    Informed Consent:    Anesthetic plan and risks discussed with patient.    Use of blood products discussed with: patient whom consented to blood products.

## 2025-05-22 NOTE — ANESTHESIA TIME REPORT
Anesthesia Start and Stop Event Times       Date Time Event    5/22/2025 1156 Ready for Procedure     1237 Anesthesia Start     1351 Anesthesia Stop          Responsible Staff  05/22/25      Name Role Begin End    Brian Jiménez M.D. Anesth 1237 1351          Overtime Reason:  no overtime (within assigned shift)    Comments:

## 2025-05-22 NOTE — ANESTHESIA POSTPROCEDURE EVALUATION
Patient: Mare Eason    Procedure Summary       Date: 05/22/25 Room / Location: [facility] ROOM 22 / SURGERY SAME DAY [facility]    Anesthesia Start: 1237 Anesthesia Stop: 1351    Procedure: INCISION AND DRAINAGE (Neck) Diagnosis: (ledwigs angina)    Surgeons: Tamanna Winchester M.D. Responsible Provider: Brian Jiménez M.D.    Anesthesia Type: general ASA Status: 1 - Emergent            Final Anesthesia Type: general  Last vitals  BP   Blood Pressure: (!) 140/96    Temp   35.9 °C (96.7 °F)    Pulse   80   Resp   (!) 11    SpO2   100 %      Anesthesia Post Evaluation    Patient location during evaluation: ICU  Patient participation: complete - patient cannot participate  Post-procedure mental status: sedated on propofol infusion.  Pain scale: appears comfortable, unable to obtain score due to intubation.    Airway patency: patent  Anesthetic complications: no  Cardiovascular status: hemodynamically stable  Respiratory status: acceptable and ETT  Hydration status: euvolemic    PONV: none          No notable events documented.

## 2025-05-22 NOTE — PROGRESS NOTES
Received report and assumed care of patient at change of shift. Patient is A&Ox4, on room air, and reports 6/10 pain at this time. Patient declined nonpharmacologic pain intervention. Pharmacologic pain intervention unavailable at this time. Facial edema noted. Will reassess when it becomes available. Patient assessment completed, bed in lowest position, and call light and personal belongings are within reach. Patient expressed no further needs at this time.

## 2025-05-22 NOTE — PROGRESS NOTES
"Mare Eason is a 25 y.o. female patient.  1. Sepsis, due to unspecified organism, unspecified whether acute organ dysfunction present (HCC)    2. Sialadenitis      Past Medical History[1]    Scheduled Meds:ibuprofen, 400 mg, Oral, TID  ampicillin-sulbactam (UNASYN) IV, 3 g, Intravenous, Q6HRS  senna-docusate, 2 Tablet, Oral, Q EVENING  pilocarpine, 5 mg, Oral, TID    Continuous Infusions:[unfilled]PRN Meds:PRN medications: hydrALAZINE, menthol, diphenhydrAMINE, acetaminophen, ondansetron, ondansetron, promethazine, promethazine, prochlorperazine, senna-docusate **AND** polyethylene glycol/lytes, HYDROcodone-acetaminophen, morphine injection, labetalol    Allergies[2]  Principal Problem:    Sialadenitis  Active Problems:    Metabolic acidosis    Hypocalcemia    BP (S) (!) 174/120   Pulse 78   Temp 36.7 °C (98 °F) (Temporal)   Resp 18   Ht 1.575 m (5' 2\")   Wt 58.1 kg (128 lb)   SpO2 96%     Subjective:  Complains of worsening swelling of neck and tongue, having difficulty speaking and swallowing   Objective:  Vital signs: (most recent) BP (S) (!) 174/120   Pulse 78   Temp 36.7 °C (98 °F) (Temporal)   Resp 18   Ht 1.575 m (5' 2\")   Wt 58.1 kg (128 lb)   SpO2 96%    Floor of mouth swollen but soft  Neck very swollen and tender  Assessment & Plan  Sialoadenitis with increased swelling, difficulty handling secreations, likely progressing to Jl's Angina  Recommend to OR for I&D   Scheduled for tierney Winchester M.D.  5/22/2025         [1]   Past Medical History:  Diagnosis Date    Encounter for other contraceptive management 1/4/2024   [2] No Known Allergies    "

## 2025-05-22 NOTE — CARE PLAN
The patient is Stable - Low risk of patient condition declining or worsening    Shift Goals  Clinical Goals: patient's pain will be controlled to comfort level by end of shift; pts vital signs will remain stable throughout the shift  Patient Goals: sleep  Family Goals: TYLER    Progress made toward(s) clinical / shift goals:        Problem: Hemodynamics  Goal: Patient's hemodynamics, fluid balance and neurologic status will be stable or improve  Outcome: Progressing  Note: VSS     Problem: Pain - Standard  Goal: Alleviation of pain or a reduction in pain to the patient’s comfort goal  Outcome: Progressing  Note: Patients pain controlled with IV pain medication intervention & ice. Patient reporting high level of pain at around 0300. Given PO pain medication due to IV medication not being available at this time. Patient requesting IV medication when available.        Patient is not progressing towards the following goals:

## 2025-05-22 NOTE — ANESTHESIA PROCEDURE NOTES
Airway    Date/Time: 5/22/2025 12:48 PM    Performed by: Brian Jiménez M.D.  Authorized by: Brian Jiménez M.D.    Location:  OR  Urgency:  Elective  Difficult Airway: No    Indications for Airway Management:  Anesthesia      Spontaneous Ventilation: absent    Sedation Level:  Deep  Preoxygenated: Yes    Patient Position:  Sniffing  Mask Difficulty Assessment:  1 - vent by mask  Final Airway Type:  Endotracheal airway  Final Endotracheal Airway:  ETT  Cuffed: Yes    Technique Used for Successful ETT Placement:  Video laryngoscopy    Insertion Site:  Oral  Blade Type:  Glide  Laryngoscope Blade/Videolaryngoscope Blade Size:  3  ETT Size (mm):  6.5  Measured from:  Lips  ETT to Lips (cm):  23  Placement Verified by: auscultation and capnometry    Cormack-Lehane Classification:  Grade IIa - partial view of glottis  Number of Attempts at Approach:  1   Bronchoscopy by Dr. Winchester demonstrated the glottis without significant deviation or obstruction. Proceeded with IV induction, able to mask easy, paralytic given, easy intubation with glidescope

## 2025-05-22 NOTE — PROGRESS NOTES
This RN assumed care of patient while primary nurse, GISELA Hoffmann, is at lunch. Patient complained of 6/10 pain. Norco pulled to administer to patient, patient expressed concern with swallowing pills at this time. [place] returned to ADS, ice pack provided.

## 2025-05-23 ENCOUNTER — APPOINTMENT (OUTPATIENT)
Dept: RADIOLOGY | Facility: MEDICAL CENTER | Age: 25
DRG: 137 | End: 2025-05-23
Attending: INTERNAL MEDICINE
Payer: MEDICAID

## 2025-05-23 PROBLEM — K12.2 LUDWIG ANGINA: Status: ACTIVE | Noted: 2025-05-23

## 2025-05-23 LAB
ANION GAP SERPL CALC-SCNC: 12 MMOL/L (ref 7–16)
ARTERIAL PATENCY WRIST A: ABNORMAL
BASE EXCESS BLDA CALC-SCNC: 2 MMOL/L (ref -4–3)
BASOPHILS # BLD AUTO: 0.1 % (ref 0–1.8)
BASOPHILS # BLD: 0.01 K/UL (ref 0–0.12)
BODY TEMPERATURE: ABNORMAL DEGREES
BREATHS SETTING VENT: 18
BUN SERPL-MCNC: 9 MG/DL (ref 8–22)
CALCIUM SERPL-MCNC: 8.3 MG/DL (ref 8.5–10.5)
CHLORIDE SERPL-SCNC: 105 MMOL/L (ref 96–112)
CO2 BLDA-SCNC: 26 MMOL/L (ref 32–48)
CO2 SERPL-SCNC: 22 MMOL/L (ref 20–33)
CREAT SERPL-MCNC: 0.73 MG/DL (ref 0.5–1.4)
DELSYS IDSYS: ABNORMAL
EOSINOPHIL # BLD AUTO: 0 K/UL (ref 0–0.51)
EOSINOPHIL NFR BLD: 0 % (ref 0–6.9)
ERYTHROCYTE [DISTWIDTH] IN BLOOD BY AUTOMATED COUNT: 48 FL (ref 35.9–50)
FUNGUS SPEC FUNGUS STN: NORMAL
GFR SERPLBLD CREATININE-BSD FMLA CKD-EPI: 117 ML/MIN/1.73 M 2
GLUCOSE SERPL-MCNC: 117 MG/DL (ref 65–99)
GRAM STN SPEC: NORMAL
HCO3 BLDA-SCNC: 24.9 MMOL/L (ref 21–28)
HCT VFR BLD AUTO: 34.7 % (ref 37–47)
HGB BLD-MCNC: 10.9 G/DL (ref 12–16)
HOROWITZ INDEX BLDA+IHG-RTO: 367 MM[HG]
IMM GRANULOCYTES # BLD AUTO: 0.08 K/UL (ref 0–0.11)
IMM GRANULOCYTES NFR BLD AUTO: 0.7 % (ref 0–0.9)
LACTATE BLD-SCNC: 0.9 MMOL/L (ref 0.5–2)
LYMPHOCYTES # BLD AUTO: 0.63 K/UL (ref 1–4.8)
LYMPHOCYTES NFR BLD: 5.2 % (ref 22–41)
MAGNESIUM SERPL-MCNC: 2 MG/DL (ref 1.5–2.5)
MCH RBC QN AUTO: 28.2 PG (ref 27–33)
MCHC RBC AUTO-ENTMCNC: 31.4 G/DL (ref 32.2–35.5)
MCV RBC AUTO: 89.9 FL (ref 81.4–97.8)
MODE IMODE: ABNORMAL
MONOCYTES # BLD AUTO: 0.29 K/UL (ref 0–0.85)
MONOCYTES NFR BLD AUTO: 2.4 % (ref 0–13.4)
NEUTROPHILS # BLD AUTO: 11.2 K/UL (ref 1.82–7.42)
NEUTROPHILS NFR BLD: 91.6 % (ref 44–72)
NRBC # BLD AUTO: 0 K/UL
NRBC BLD-RTO: 0 /100 WBC (ref 0–0.2)
O2/TOTAL GAS SETTING VFR VENT: 30 %
PCO2 BLDA: 32.5 MMHG (ref 32–48)
PCO2 TEMP ADJ BLDA: 31.7 MMHG (ref 32–48)
PEEP END EXPIRATORY PRESSURE IPEEP: 8 CMH20
PH BLDA: 7.49 [PH] (ref 7.35–7.45)
PH TEMP ADJ BLDA: 7.5 [PH] (ref 7.35–7.45)
PHOSPHATE SERPL-MCNC: 3.8 MG/DL (ref 2.5–4.5)
PLATELET # BLD AUTO: 176 K/UL (ref 164–446)
PMV BLD AUTO: 11.2 FL (ref 9–12.9)
PO2 BLDA: 110 MMHG (ref 83–108)
PO2 TEMP ADJ BLDA: 107 MMHG (ref 83–108)
POTASSIUM SERPL-SCNC: 3.9 MMOL/L (ref 3.6–5.5)
RBC # BLD AUTO: 3.86 M/UL (ref 4.2–5.4)
SAO2 % BLDA: 99 % (ref 93–99)
SCCMEC + MECA PNL NOSE NAA+PROBE: NEGATIVE
SIGNIFICANT IND 70042: NORMAL
SIGNIFICANT IND 70042: NORMAL
SITE SITE: NORMAL
SITE SITE: NORMAL
SODIUM SERPL-SCNC: 139 MMOL/L (ref 135–145)
SOURCE SOURCE: NORMAL
SOURCE SOURCE: NORMAL
SPECIMEN DRAWN FROM PATIENT: ABNORMAL
TIDAL VOLUME IVT: 310 ML
WBC # BLD AUTO: 12.2 K/UL (ref 4.8–10.8)

## 2025-05-23 PROCEDURE — 85025 COMPLETE CBC W/AUTO DIFF WBC: CPT

## 2025-05-23 PROCEDURE — 99291 CRITICAL CARE FIRST HOUR: CPT | Performed by: INTERNAL MEDICINE

## 2025-05-23 PROCEDURE — 83605 ASSAY OF LACTIC ACID: CPT

## 2025-05-23 PROCEDURE — 71045 X-RAY EXAM CHEST 1 VIEW: CPT

## 2025-05-23 PROCEDURE — 700111 HCHG RX REV CODE 636 W/ 250 OVERRIDE (IP): Performed by: INTERNAL MEDICINE

## 2025-05-23 PROCEDURE — 83735 ASSAY OF MAGNESIUM: CPT

## 2025-05-23 PROCEDURE — 700105 HCHG RX REV CODE 258: Performed by: STUDENT IN AN ORGANIZED HEALTH CARE EDUCATION/TRAINING PROGRAM

## 2025-05-23 PROCEDURE — 700102 HCHG RX REV CODE 250 W/ 637 OVERRIDE(OP): Performed by: INTERNAL MEDICINE

## 2025-05-23 PROCEDURE — A9270 NON-COVERED ITEM OR SERVICE: HCPCS | Performed by: INTERNAL MEDICINE

## 2025-05-23 PROCEDURE — 700111 HCHG RX REV CODE 636 W/ 250 OVERRIDE (IP): Performed by: STUDENT IN AN ORGANIZED HEALTH CARE EDUCATION/TRAINING PROGRAM

## 2025-05-23 PROCEDURE — 36600 WITHDRAWAL OF ARTERIAL BLOOD: CPT

## 2025-05-23 PROCEDURE — 87641 MR-STAPH DNA AMP PROBE: CPT

## 2025-05-23 PROCEDURE — 700111 HCHG RX REV CODE 636 W/ 250 OVERRIDE (IP): Mod: JZ | Performed by: INTERNAL MEDICINE

## 2025-05-23 PROCEDURE — 99292 CRITICAL CARE ADDL 30 MIN: CPT | Performed by: INTERNAL MEDICINE

## 2025-05-23 PROCEDURE — 700105 HCHG RX REV CODE 258: Performed by: INTERNAL MEDICINE

## 2025-05-23 PROCEDURE — 700111 HCHG RX REV CODE 636 W/ 250 OVERRIDE (IP): Mod: JZ | Performed by: STUDENT IN AN ORGANIZED HEALTH CARE EDUCATION/TRAINING PROGRAM

## 2025-05-23 PROCEDURE — 770022 HCHG ROOM/CARE - ICU (200)

## 2025-05-23 PROCEDURE — 94003 VENT MGMT INPAT SUBQ DAY: CPT

## 2025-05-23 PROCEDURE — 82803 BLOOD GASES ANY COMBINATION: CPT

## 2025-05-23 PROCEDURE — 84100 ASSAY OF PHOSPHORUS: CPT

## 2025-05-23 PROCEDURE — 80048 BASIC METABOLIC PNL TOTAL CA: CPT

## 2025-05-23 RX ORDER — MIDAZOLAM HYDROCHLORIDE 1 MG/ML
2-4 INJECTION INTRAMUSCULAR; INTRAVENOUS
Status: DISCONTINUED | OUTPATIENT
Start: 2025-05-23 | End: 2025-05-24

## 2025-05-23 RX ORDER — ENOXAPARIN SODIUM 100 MG/ML
40 INJECTION SUBCUTANEOUS DAILY
Status: DISCONTINUED | OUTPATIENT
Start: 2025-05-23 | End: 2025-05-25 | Stop reason: HOSPADM

## 2025-05-23 RX ORDER — DIPHENHYDRAMINE HCL 25 MG
25 TABLET ORAL EVERY 6 HOURS PRN
Status: DISCONTINUED | OUTPATIENT
Start: 2025-05-23 | End: 2025-05-24

## 2025-05-23 RX ORDER — DEXAMETHASONE SODIUM PHOSPHATE 4 MG/ML
4 INJECTION, SOLUTION INTRA-ARTICULAR; INTRALESIONAL; INTRAMUSCULAR; INTRAVENOUS; SOFT TISSUE EVERY 6 HOURS
Status: DISCONTINUED | OUTPATIENT
Start: 2025-05-23 | End: 2025-05-25

## 2025-05-23 RX ORDER — PILOCARPINE HYDROCHLORIDE 5 MG/1
5 TABLET, FILM COATED ORAL 3 TIMES DAILY
Status: DISCONTINUED | OUTPATIENT
Start: 2025-05-23 | End: 2025-05-24

## 2025-05-23 RX ORDER — POLYETHYLENE GLYCOL 3350 17 G/17G
1 POWDER, FOR SOLUTION ORAL
Status: DISCONTINUED | OUTPATIENT
Start: 2025-05-23 | End: 2025-05-24

## 2025-05-23 RX ORDER — ACETAMINOPHEN 325 MG/1
650 TABLET ORAL EVERY 6 HOURS PRN
Status: DISCONTINUED | OUTPATIENT
Start: 2025-05-23 | End: 2025-05-24

## 2025-05-23 RX ORDER — AMOXICILLIN 250 MG
2 CAPSULE ORAL EVERY EVENING
Status: DISCONTINUED | OUTPATIENT
Start: 2025-05-23 | End: 2025-05-24

## 2025-05-23 RX ADMIN — MIDAZOLAM HYDROCHLORIDE 2 MG: 1 INJECTION, SOLUTION INTRAMUSCULAR; INTRAVENOUS at 05:25

## 2025-05-23 RX ADMIN — METHYLPREDNISOLONE SODIUM SUCCINATE 62.5 MG: 125 INJECTION, POWDER, FOR SOLUTION INTRAMUSCULAR; INTRAVENOUS at 05:26

## 2025-05-23 RX ADMIN — MIDAZOLAM HYDROCHLORIDE 2 MG: 1 INJECTION, SOLUTION INTRAMUSCULAR; INTRAVENOUS at 06:26

## 2025-05-23 RX ADMIN — PROPOFOL 80 MCG/KG/MIN: 10 INJECTION, EMULSION INTRAVENOUS at 10:49

## 2025-05-23 RX ADMIN — AMPICILLIN SODIUM, SULBACTAM SODIUM 3 G: 2; 1 INJECTION, POWDER, FOR SOLUTION INTRAMUSCULAR; INTRAVENOUS at 17:13

## 2025-05-23 RX ADMIN — MIDAZOLAM HYDROCHLORIDE 2 MG: 1 INJECTION, SOLUTION INTRAMUSCULAR; INTRAVENOUS at 02:28

## 2025-05-23 RX ADMIN — ENOXAPARIN SODIUM 40 MG: 100 INJECTION SUBCUTANEOUS at 17:11

## 2025-05-23 RX ADMIN — PROPOFOL 70 MCG/KG/MIN: 10 INJECTION, EMULSION INTRAVENOUS at 15:07

## 2025-05-23 RX ADMIN — FAMOTIDINE 20 MG: 20 TABLET, FILM COATED ORAL at 17:11

## 2025-05-23 RX ADMIN — PROPOFOL 60 MCG/KG/MIN: 10 INJECTION, EMULSION INTRAVENOUS at 18:57

## 2025-05-23 RX ADMIN — LINEZOLID 600 MG: 2 INJECTION, SOLUTION INTRAVENOUS at 06:33

## 2025-05-23 RX ADMIN — DEXAMETHASONE SODIUM PHOSPHATE 4 MG: 4 INJECTION, SOLUTION INTRAMUSCULAR; INTRAVENOUS at 17:11

## 2025-05-23 RX ADMIN — PROPOFOL 80 MCG/KG/MIN: 10 INJECTION, EMULSION INTRAVENOUS at 06:59

## 2025-05-23 RX ADMIN — DOCUSATE SODIUM 50 MG AND SENNOSIDES 8.6 MG 2 TABLET: 8.6; 5 TABLET, FILM COATED ORAL at 17:11

## 2025-05-23 RX ADMIN — MIDAZOLAM HYDROCHLORIDE 2 MG: 1 INJECTION, SOLUTION INTRAMUSCULAR; INTRAVENOUS at 08:21

## 2025-05-23 RX ADMIN — MIDAZOLAM HYDROCHLORIDE 4 MG: 1 INJECTION, SOLUTION INTRAMUSCULAR; INTRAVENOUS at 09:46

## 2025-05-23 RX ADMIN — HYDROMORPHONE HYDROCHLORIDE 1 MG: 1 INJECTION, SOLUTION INTRAMUSCULAR; INTRAVENOUS; SUBCUTANEOUS at 11:28

## 2025-05-23 RX ADMIN — PROPOFOL 80 MCG/KG/MIN: 10 INJECTION, EMULSION INTRAVENOUS at 03:33

## 2025-05-23 RX ADMIN — LINEZOLID 600 MG: 2 INJECTION, SOLUTION INTRAVENOUS at 18:46

## 2025-05-23 RX ADMIN — MIDAZOLAM HYDROCHLORIDE 4 MG: 1 INJECTION, SOLUTION INTRAMUSCULAR; INTRAVENOUS at 12:02

## 2025-05-23 RX ADMIN — DEXAMETHASONE SODIUM PHOSPHATE 4 MG: 4 INJECTION, SOLUTION INTRAMUSCULAR; INTRAVENOUS at 11:27

## 2025-05-23 RX ADMIN — FAMOTIDINE 20 MG: 10 INJECTION, SOLUTION INTRAVENOUS at 05:26

## 2025-05-23 RX ADMIN — HYDRALAZINE HYDROCHLORIDE 20 MG: 20 INJECTION INTRAMUSCULAR; INTRAVENOUS at 10:20

## 2025-05-23 RX ADMIN — AMPICILLIN SODIUM, SULBACTAM SODIUM 3 G: 2; 1 INJECTION, POWDER, FOR SOLUTION INTRAMUSCULAR; INTRAVENOUS at 11:33

## 2025-05-23 RX ADMIN — LABETALOL HYDROCHLORIDE 10 MG: 5 INJECTION, SOLUTION INTRAVENOUS at 09:13

## 2025-05-23 RX ADMIN — PILOCARPINE HYDROCHLORIDE 5 MG: 5 TABLET, FILM COATED ORAL at 17:11

## 2025-05-23 RX ADMIN — AMPICILLIN SODIUM, SULBACTAM SODIUM 3 G: 2; 1 INJECTION, POWDER, FOR SOLUTION INTRAMUSCULAR; INTRAVENOUS at 05:26

## 2025-05-23 RX ADMIN — HYDROMORPHONE HYDROCHLORIDE 1 MG/HR: 10 INJECTION, SOLUTION INTRAMUSCULAR; INTRAVENOUS; SUBCUTANEOUS at 13:41

## 2025-05-23 RX ADMIN — HYDROMORPHONE HYDROCHLORIDE 1 MG: 1 INJECTION, SOLUTION INTRAMUSCULAR; INTRAVENOUS; SUBCUTANEOUS at 10:13

## 2025-05-23 ASSESSMENT — PAIN DESCRIPTION - PAIN TYPE
TYPE: ACUTE PAIN

## 2025-05-23 NOTE — PROGRESS NOTES
"Mare Eason is a 25 y.o. female patient.  1. Sepsis, due to unspecified organism, unspecified whether acute organ dysfunction present (HCC)    2. Sialadenitis      Past Medical History[1]    Scheduled Meds:pilocarpine, 5 mg, Enteral Tube, TID  dexamethasone, 4 mg, Intravenous, Q6HRS  senna-docusate, 2 Tablet, Enteral Tube, Q EVENING  famotidine, 20 mg, Enteral Tube, Q12HRS   Or  famotidine, 20 mg, Intravenous, Q12HRS  MD Alert...Adult ICU Electrolyte Replacement per Pharmacy, , Other, PHARMACY TO DOSE  linezolid (ZYVOX) IV, 600 mg, Intravenous, Q12HRS  ampicillin-sulbactam (UNASYN) IV, 3 g, Intravenous, Q6HRS    Continuous Infusions:[unfilled]PRN Meds:PRN medications: midazolam, senna-docusate **AND** polyethylene glycol/lytes, acetaminophen, diphenhydrAMINE, hydrALAZINE, Respiratory Therapy Consult, lidocaine, HYDROmorphone **OR** HYDROmorphone, ondansetron, labetalol    Allergies[2]  Principal Problem:    Sialadenitis  Active Problems:    Metabolic acidosis    Hypocalcemia    Encounter for weaning from ventilator ([facility])    BP (!) 140/83   Pulse 62   Temp 36.1 °C (97 °F) (Temporal)   Resp 16   Ht 1.575 m (5' 2\")   Wt 60.3 kg (132 lb 15 oz)   SpO2 100%     Subjective: Intubated, sedated  Objective:  Vital signs: (most recent) BP (!) 140/83   Pulse 62   Temp 36.1 °C (97 °F) (Temporal)   Resp 16   Ht 1.575 m (5' 2\")   Wt 60.3 kg (132 lb 15 oz)   SpO2 100%    Neck swollen with penrose in place with serosanguinous drainage.  Tongue and floor of mouth much softer.  Drain advanced 2 cm  Assessment & Plan:  S/P I&D Jl's angina.  Continue current care  Okay to place NGT, Iris  Possibly extubate jody Winchester M.D.  5/23/2025         [1]   Past Medical History:  Diagnosis Date    Encounter for other contraceptive management 1/4/2024   [2] No Known Allergies    "

## 2025-05-23 NOTE — ASSESSMENT & PLAN NOTE
S/P I&D of submandibular abscess on 5/22  Cultures with Streptococcus constellatus  Continue ampicillin/sulbactam

## 2025-05-23 NOTE — CARE PLAN
The patient is Watcher - Medium risk of patient condition declining or worsening    Shift Goals  Clinical Goals: monitor airway ,Q4 Neuro  Patient Goals: TYLER  Family Goals: TYLER    Progress made toward(s) clinical / shift goals:      Problem: Knowledge Deficit - Standard  Goal: Patient and family/care givers will demonstrate understanding of plan of care, disease process/condition, diagnostic tests and medications  Outcome: Progressing     Problem: Hemodynamics  Goal: Patient's hemodynamics, fluid balance and neurologic status will be stable or improve  Outcome: Progressing     Problem: Fluid Volume  Goal: Fluid volume balance will be maintained  Outcome: Progressing     Problem: Urinary - Renal Perfusion  Goal: Ability to achieve and maintain adequate renal perfusion and functioning will improve  Outcome: Progressing     Problem: Mechanical Ventilation  Goal: Safe management of artificial airway and ventilation  Outcome: Progressing     Problem: Pain - Standard  Goal: Alleviation of pain or a reduction in pain to the patient’s comfort goal  Outcome: Progressing     Problem: Safety - Medical Restraint  Goal: Remains free of injury from restraints (Restraint for Interference with Medical Device)  Outcome: Progressing       Patient is not progressing towards the following goals:

## 2025-05-23 NOTE — DISCHARGE PLANNING
Case Management Discharge Planning    Admission Date: 5/20/2025  GMLOS: 4.1  ALOS: 3    6-Clicks ADL Score: 24  6-Clicks Mobility Score: 24    Anticipated Discharge Dispo: Discharge Disposition: Discharged to home/self care (01)    DME Needed: No    Action(s) Taken:   Chart review was completed. Patient was discussed during IDT rounds.    Per IDT, pt is s/p I&D. Intubated; vent day 2.     Discharge assessment was completed (see below).     Escalations Completed: None    Medically Clear: No    Next Steps:  CM RN to follow up with medical team to discuss discharge barriers or needs.     Barriers to Discharge: Medical clearance    Is the patient up for discharge tomorrow: No    Care Transition Team Assessment    Patient is currently intubated and unable to participate in discharge assessment at this time; information for this assessment was obtained from her mother-NOK Asia Meredith ([phone]) over the phone.       Case management role discussed; understanding of case management role verbalized.   Demographics on face sheet verified.   Please see H&P for pertinent PMH and reason for admission.   Housing: Pt lives with her 2 daughters (minors) in an apartment located in [place], [place]. The address listed on the face sheet was verified to be correct mailing and discharge address. Asia stated children are under her care and children's father during pt's admission.   IADLS/ADLS: Independent with IADLS/ADLS and ambulates with no use of DME at baseline.   DME: None owned/used   O2: RA at baseline   Discharge support: Pt has her mother and grandmother Jackelyn for support in the community. Family can provide transportation at time of discharge.   PCP: Not established   Preferred pharmacy: [facility]   Insurance: Uninsured. Pending [place] Medicaid eligibility.   AD: None on file. Pt's mother Asia Meredith ([phone]) is legal NOK.   Discharge planning: HH vs Home     Information Source  Orientation Level: Unable to  assess  Information Given By: Parent  Informant's Name: Asia Meredith (mother)  Who is responsible for making decisions for patient? : Legal next of kin  Name(s) of Primary Decision Maker: Asia Meredith [phone]    Readmission Evaluation  Is this a readmission?: No    Elopement Risk  Legal Hold: No  Ambulatory or Self Mobile in Wheelchair: No-Not an Elopement Risk  Disoriented: No  Psychiatric Symptoms: None  History of Wandering: No  Elopement this Admit: No  Vocalizing Wanting to Leave: No  Displays Behaviors, Body Language Wanting to Leave: No-Not at Risk for Elopement  Elopement Risk: Not at Risk for Elopement    Interdisciplinary Discharge Planning  Lives with - Patient's Self Care Capacity: Child Less than 18 Years of Age  Patient or legal guardian wants to designate a caregiver: No  Support Systems: Family Member(s)  Housing / Facility: 2 Story Apartment / Condo    Discharge Preparedness  What is your plan after discharge?: Home with help, Uncertain - pending medical team collaboration  What are your discharge supports?: Parent, Grandparent  Prior Functional Level: Ambulatory, Drives Self, Independent with Activities of Daily Living, Independent with Medication Management  Difficulity with ADLs: None  Difficulity with IADLs: None    Functional Assesment  Prior Functional Level: Ambulatory, Drives Self, Independent with Activities of Daily Living, Independent with Medication Management    Finances  Financial Barriers to Discharge: No  Prescription Coverage: No  [facility]age Comments[facility] [place] [facility]    Advance Directive  Advance Directive?: None    Domestic Abuse  Have you ever been the victim of abuse or violence?: No  Possible Abuse/Neglect Reported to:: Not Applicable    Psychological Assessment  History of Substance Abuse: None  History of Psychiatric Problems: No  Non-compliant with Treatment: No  Newly Diagnosed Illness: No    Discharge Risks or Barriers  Discharge risks or  barriers?: Complex medical needs  Patient risk factors: Complex medical needs    Anticipated Discharge Information  Discharge Disposition: Discharged to home/self care (01)

## 2025-05-23 NOTE — CARE PLAN
The patient is Stable - Low risk of patient condition declining or worsening    Shift Goals  Clinical Goals: Monitor airway  Patient Goals: TYLER  Family Goals: Updates    Progress made toward(s) clinical / shift goals:    Problem: Hemodynamics  Goal: Patient's hemodynamics, fluid balance and neurologic status will be stable or improve  Outcome: Progressing     Problem: Fluid Volume  Goal: Fluid volume balance will be maintained  Outcome: Progressing     Problem: Urinary - Renal Perfusion  Goal: Ability to achieve and maintain adequate renal perfusion and functioning will improve  Outcome: Progressing     Problem: Pain - Standard  Goal: Alleviation of pain or a reduction in pain to the patient’s comfort goal  Outcome: Progressing     Problem: Safety - Medical Restraint  Goal: Remains free of injury from restraints (Restraint for Interference with Medical Device)  Outcome: Progressing     Problem: Skin Integrity  Goal: Skin integrity is maintained or improved  Outcome: Progressing

## 2025-05-23 NOTE — OP REPORT
DATE OF SERVICE:  05/22/2025     PREOPERATIVE DIAGNOSIS:  Sialadenitis with abscess.     POSTOPERATIVE DIAGNOSIS:  Jl's angina.     PROCEDURE:  Incision and drainage of neck infection and floor of mouth.     ATTENDING SURGEON:  Tamanna Winchester MD     ANESTHESIOLOGIST:  Brian Jiménez MD     COMPLICATIONS:  None.     SPECIMENS:  Wound culture.     PROCEDURE IN DETAIL:  The patient was appropriately identified and taken to   the operating room because of swelling of her neck and her tongue.  She was   initially given aerosolized lidocaine and then sprayed down.  A flexible scope   was done to evaluate her airway through the left side of her nose.  The nasal   patches and nasopharynx were clear.  The oropharynx showed some exudate.  The   tongue base was slightly swollen.  She did have some retroflexion of the   epiglottis, but with deep breaths, it was easy to see the vocal cords at this   point and there was not collapse of the tongue base.  The scope was removed   and she was intubated per the anesthesiologist with the glide scope and then   she was secured in position.  The patient was then prepped and draped in a   sterile fashion.  A 1% lidocaine was injected under her neck at the maximum   point in the midline where the swelling was.  An incision was made in the   skin.  Bleeding was stopped using monopolar cautery.  Further dissection was   taken down the midline using monopolar cautery and then blunt hemostat was   used to dissect through the tissues.  The tissue was noted to be very inflamed   and stranded.  Dissection was carried bilaterally along the submandibular glands and   then up towards the floor of the mouth entering a pocket of purulence.  This   was taken for culture.  Palpation was then also done of the mouth.  The tongue   was very firm as well as the right floor of the mouth.  Further dissection   was carried along the floor of the mouth opening up this pocket.  This was    irrigated out several times and then suctioned.  Further palpation of the   floor of the mouth, it felt much softer.  The area was irrigated again and   then a half-inch Penrose was placed in the wound.  This was secured into place   using a 3-0 silk suture.  The patient was left intubated and taken to the   ICU.        ______________________________  MD CHRISTINE Hollins/ZUHAIR    DD:  05/22/2025 17:03  DT:  05/22/2025 17:25    Job#:  [id number]

## 2025-05-23 NOTE — PROGRESS NOTES
"Critical Care Progress Note    Date of admission  5/20/2025    Chief Complaint  25 y.o. female who presented 5/20/2025 with no chronic medical conditions, + tobacco abuse. That has presented to [facility]. Found to have submandibular abscess down to the right vallecula and transferred to [facility] for ENT consultation, patient managed on floor but failed conservative management and was taken to OR today By Dr Winchester for I and D. She was left intubated with a 6.5 ET and will required post operative monitoring in ICU. Hx is limited secondary to being intubated. \" Dr White's note    Hospital Course  5/22 admitted to ICU postop    Interval Problem Update  Reviewed last 24 hour events:  No acute changes overnight  On high dose propofol with versed pushes to keep pt RASS -3  Pt evidently requires high dose propofol and still got agitated.   -130s. Not on pressor  HR in 80s  Afebrile  On full vent support 30%/ PEEP 8  ABG acceptable  Right submandibular area remains swollen but appears improving compared to admission per report.   WBC 12.2  Creatinine 0.68-0.73    Review of Systems  Review of Systems   Reason unable to perform ROS: intubated, sedated, RASS -4, unable to perform.        Vital Signs for last 24 hours   Temp:  [35.9 °C (96.7 °F)-36.6 °C (97.8 °F)] 36.1 °C (97 °F)  Pulse:  [56-89] 62  Resp:  [10-27] 16  BP: (117-200)/() 140/83  SpO2:  [98 %-100 %] 100 %    Hemodynamic parameters for last 24 hours       Respiratory Information for the last 24 hours  Vent Mode: APVCMV  Rate (breaths/min): 16  Vt Target (mL): 310  PEEP/CPAP: 8  MAP: 11  Control VTE (exp VT): 322    Physical Exam   Physical Exam  Vitals and nursing note reviewed.   Constitutional:       General: She is not in acute distress.     Appearance: She is obese. She is ill-appearing and toxic-appearing. She is not diaphoretic.   HENT:      Head: Normocephalic and atraumatic.      Mouth/Throat:      Mouth: Mucous membranes are dry.     "  Comments: ET tube in place  Eyes:      Pupils: Pupils are equal, round, and reactive to light.   Cardiovascular:      Rate and Rhythm: Normal rate and regular rhythm.      Pulses: Normal pulses.      Heart sounds: Normal heart sounds. No murmur heard.  Pulmonary:      Effort: No respiratory distress.      Breath sounds: Normal breath sounds. No wheezing, rhonchi or rales.   Abdominal:      General: There is no distension.      Palpations: Abdomen is soft.      Tenderness: There is no abdominal tenderness. There is no guarding.   Musculoskeletal:      Cervical back: Neck supple.      Right lower leg: No edema.      Left lower leg: No edema.   Skin:     Capillary Refill: Capillary refill takes less than 2 seconds.      Coloration: Skin is not jaundiced.      Findings: No bruising or rash.   Neurological:      General: No focal deficit present.      Mental Status: She is oriented to person, place, and time.         Medications  Current Medications[1]    Fluids    Intake/Output Summary (Last 24 hours) at 5/23/2025 0754  Last data filed at 5/23/2025 0600  Gross per 24 hour   Intake 3033.65 ml   Output 2055 ml   Net 978.65 ml       Laboratory  Recent Labs     05/22/25  1507 05/23/25  0436   ISTATAPH 7.354 7.492*   ISTATAPCO2 51.1* 32.5   ISTATAPO2 93 110*   ISTATATCO2 30* 26*   OCQTDWN5RNB 97 99   ISTATARTHCO3 28.5* 24.9   ISTATARTBE 2 2   ISTATTEMP 97.4 F 97.6 F   ISTATFIO2 30 30   ISTATSPEC Arterial Arterial   ISTATAPHTC 7.364 7.501*   CSTYLQAL3LY 89 107         Recent Labs     05/21/25  0107 05/22/25  0101 05/23/25  0531   SODIUM 140 141 139   POTASSIUM 3.6 3.7 3.9   CHLORIDE 106 107 105   CO2 22 23 22   BUN 8 12 9   CREATININE 0.78 0.68 0.73   MAGNESIUM 1.9  --  2.0   PHOSPHORUS  --   --  3.8   CALCIUM 8.4* 8.4* 8.3*     Recent Labs     05/20/25  1539 05/21/25  0107 05/22/25  0101 05/23/25  0531   ALTSGPT 17 16  --   --    ASTSGOT 25 23  --   --    ALKPHOSPHAT 113* 93  --   --    TBILIRUBIN 0.4 0.3  --   --     GLUCOSE 126* 115* 94 117*     Recent Labs     05/20/25  1539 05/21/25  0107 05/22/25  0101 05/23/25  0531   WBC 21.8* 19.7* 15.2* 12.2*   NEUTSPOLYS 96.50* 89.60*  --  91.60*   LYMPHOCYTES 2.60* 4.00*  --  5.20*   MONOCYTES 0.90 4.40  --  2.40   EOSINOPHILS 0.00 0.00  --  0.00   BASOPHILS 0.00 0.20  --  0.10   ASTSGOT 25 23  --   --    ALTSGPT 17 16  --   --    ALKPHOSPHAT 113* 93  --   --    TBILIRUBIN 0.4 0.3  --   --      Recent Labs     05/21/25 0107 05/22/25  0101 05/23/25  0531   RBC 3.88* 3.76* 3.86*   HEMOGLOBIN 11.0* 10.7* 10.9*   HEMATOCRIT 34.1* 32.9* 34.7*   PLATELETCT 174 167 176   PROTHROMBTM 14.1  --   --    INR 1.08  --   --        Imaging  X-Ray:  I have personally reviewed the images and compared with prior images.  CT:    Reviewed    Assessment/Plan  * Sialadenitis- (present on admission)  Assessment & Plan  Progressively worsening requiring surgery 5/22 By Dr Winchester   Appreciate ENT recommendation  Follow up cultures  Continue Salagen per ENT  Continue Unasyn add zyvox as MRSA needs to be covered with obstructed stones abscess.   Pending MRSA nare  Continue steroids    Jl angina  Assessment & Plan  S/p I &D abscess in floor of mouth by ENT on 5/21  Intra-op culture negative to date.   Follow up culture  Contnineu linezolid and unasyn  If negative MRSA, linezolid can likely be discontinued      Encounter for weaning from ventilator (HCC)  Assessment & Plan  Intubated date: 5/22 with glide scope in OR w/ 6.5 ET    Plan:   Continue full vent support  Pt is awake and agitated despite on high dose propofol at 80 and versed pushes.   Adding dilaudid gtt.   For safety reason, will keep pt intubated today   Plan to wean sedation tomorrow, hopefully swelling continues to improve.   Goal saturation > 90%  Lung protective ventilation strategy  VAP bundle prevention, oral care, post pyloric feeding  Head of bed > 30 degree  GI prophylaxis  ABCDEF bundle  D/w ENT           VTE:  Lovenox  Ulcer: H2  Antagonist  Lines: Central Line  Ongoing indication addressed, Arterial Line  Ongoing indication addressed, and Ribera Catheter  Ongoing indication addressed    I have performed a physical exam and reviewed and updated ROS and Plan today (5/23/2025). In review of yesterday's note (5/22/2025), there are no changes except as documented above.     Discussed patient condition and risk of morbidity and/or mortality with RN, RT, Pharmacy, Charge nurse / hot rounds, and Patient  The patient remains critically ill.  Critical care time = 80 minutes in directly providing and coordinating critical care and extensive data review.  No time overlap and excludes procedures.       [1]   Current Facility-Administered Medications   Medication Dose Route Frequency Provider Last Rate Last Admin    midazolam (Versed) injection 2-4 mg  2-4 mg Intravenous Q HOUR PRN Yue Mandel M.D.   2 mg at 05/23/25 0626    hydrALAZINE (Apresoline) injection 20 mg  20 mg Intravenous Q6HRS PRN Alhaji Booth M.D.   20 mg at 05/22/25 0954    HYDROcodone-acetaminophen (Norco) 5-325 MG per tablet 2 Tablet  2 Tablet Oral Q4HRS PRN Alhaji Booth M.D.        Respiratory Therapy Consult   Nebulization Continuous RT Brian White M.D.        famotidine (Pepcid) tablet 20 mg  20 mg Enteral Tube Q12HRS Brian White M.D.        Or    famotidine (Pepcid) injection 20 mg  20 mg Intravenous Q12HRS Brian White M.D.   20 mg at 05/23/25 0526    senna-docusate (Pericolace Or Senokot S) 8.6-50 MG per tablet 2 Tablet  2 Tablet Enteral Tube BID Brian White M.D.        And    polyethylene glycol/lytes (Miralax) Packet 1 Packet  1 Packet Enteral Tube QDAY PRN Brian White M.D.        And    magnesium hydroxide (Milk Of Magnesia) suspension 30 mL  30 mL Enteral Tube QDAY PRN Brian White M.D.        And    bisacodyl (Dulcolax) suppository 10 mg  10 mg Rectal QDAY PRN Brian White M.D.        MD Alert...ICU Electrolyte Replacement per  Pharmacy   Other PHARMACY TO DOSE Brian White M.D.        lidocaine (Xylocaine) 1 % injection 2 mL  2 mL Tracheal Tube Q30 MIN PRN Brian White M.D.        HYDROmorphone (Dilaudid) injection 0.5 mg  0.5 mg Intravenous Q HOUR PRN Brian White M.D.        Or    HYDROmorphone (Dilaudid) injection 1 mg  1 mg Intravenous Q HOUR PRN Brian White M.D.   1 mg at 05/22/25 2323    propofol (DIPRIVAN) injection  0-80 mcg/kg/min (Ideal) Intravenous Continuous Brian White M.D. 24 mL/hr at 05/23/25 0659 80 mcg/kg/min at 05/23/25 0659    Linezolid (Zyvox) premix 600 mg  600 mg Intravenous Q12HRS Brian White M.D.   Stopped at 05/23/25 0733    methylPREDNISolone sod succ (Solu-Medrol) 125 MG injection 62.5 mg  62.5 mg Intravenous Q8HRS Alhaji Booth M.D.   62.5 mg at 05/23/25 0526    menthol (Halls) lozenge 1 Lozenge  1 Lozenge Oral PRN Alhaji Booth M.D.        diphenhydrAMINE (Benadryl) tablet/capsule 25 mg  25 mg Oral Q6HRS PRN KARISSA Ramon.P.R.N.   25 mg at 05/21/25 2023    lactated ringers infusion   Intravenous Continuous Ramone Doyle M.D. 100 mL/hr at 05/22/25 1430 New Bag at 05/22/25 1430    acetaminophen (Tylenol) tablet 650 mg  650 mg Oral Q6HRS PRN Ramone Doyle M.D.        ampicillin/sulbactam (Unasyn) 3 g in  mL IVPB  3 g Intravenous Q6HRS Ramone Doyle M.D.   Stopped at 05/23/25 0556    ondansetron (Zofran) syringe/vial injection 4 mg  4 mg Intravenous Q4HRS PRN Ramone Doyle M.D.   4 mg at 05/22/25 0609    ondansetron (Zofran ODT) dispertab 4 mg  4 mg Oral Q4HRS PRN Ramone Doyle M.D.        promethazine (Phenergan) tablet 12.5-25 mg  12.5-25 mg Oral Q4HRS PRN Ramone Doyle M.D.        promethazine (Phenergan) suppository 12.5-25 mg  12.5-25 mg Rectal Q4HRS PRN Ramone Doyle M.D.        prochlorperazine (Compazine) injection 5-10 mg  5-10 mg Intravenous Q4HRS PRN Ramone Doyle M.D.        senna-docusate (Pericolace Or Senokot S) 8.6-50  MG per tablet 2 Tablet  2 Tablet Oral Q EVENING Ramone Doyle M.D.        And    polyethylene glycol/lytes (Miralax) Packet 1 Packet  1 Packet Oral QDAY PRN Ramone Doyle M.D.        labetalol (Normodyne/Trandate) injection 10 mg  10 mg Intravenous Q4HRS PRN Ramone Doyle M.D.   10 mg at 05/22/25 0801    pilocarpine (Salagen) tablet 5 mg  5 mg Oral TID Ramone Doyle M.D.   5 mg at 05/22/25 0521

## 2025-05-23 NOTE — PROGRESS NOTES
Iris Placement    Tube Team verified patient name and medical record number prior to tube placement. Iris feeding tube (43 inches, 10 Grenadian) placed at 60  cm in right nare. Per Iris picture, tube appears to be in the small bowel.    Nursing Instructions: Await KUB to confirm placement before use for medications or feeding. Stylet, may be removed, please place in labeled bag with insufflation bulb and save in patient medication drawer.

## 2025-05-24 ENCOUNTER — APPOINTMENT (OUTPATIENT)
Dept: RADIOLOGY | Facility: MEDICAL CENTER | Age: 25
DRG: 137 | End: 2025-05-24
Attending: INTERNAL MEDICINE
Payer: MEDICAID

## 2025-05-24 LAB
ANION GAP SERPL CALC-SCNC: 11 MMOL/L (ref 7–16)
BACTERIA WND AEROBE CULT: ABNORMAL
BACTERIA WND AEROBE CULT: ABNORMAL
BASOPHILS # BLD AUTO: 0.1 % (ref 0–1.8)
BASOPHILS # BLD: 0.01 K/UL (ref 0–0.12)
BUN SERPL-MCNC: 10 MG/DL (ref 8–22)
CALCIUM SERPL-MCNC: 8.2 MG/DL (ref 8.5–10.5)
CHLORIDE SERPL-SCNC: 109 MMOL/L (ref 96–112)
CO2 SERPL-SCNC: 24 MMOL/L (ref 20–33)
CREAT SERPL-MCNC: 0.82 MG/DL (ref 0.5–1.4)
EOSINOPHIL # BLD AUTO: 0 K/UL (ref 0–0.51)
EOSINOPHIL NFR BLD: 0 % (ref 0–6.9)
ERYTHROCYTE [DISTWIDTH] IN BLOOD BY AUTOMATED COUNT: 48 FL (ref 35.9–50)
GFR SERPLBLD CREATININE-BSD FMLA CKD-EPI: 102 ML/MIN/1.73 M 2
GLUCOSE SERPL-MCNC: 130 MG/DL (ref 65–99)
GRAM STN SPEC: ABNORMAL
HCT VFR BLD AUTO: 32.7 % (ref 37–47)
HGB BLD-MCNC: 10.4 G/DL (ref 12–16)
IMM GRANULOCYTES # BLD AUTO: 0.08 K/UL (ref 0–0.11)
IMM GRANULOCYTES NFR BLD AUTO: 0.8 % (ref 0–0.9)
LYMPHOCYTES # BLD AUTO: 0.84 K/UL (ref 1–4.8)
LYMPHOCYTES NFR BLD: 8.3 % (ref 22–41)
MAGNESIUM SERPL-MCNC: 2.5 MG/DL (ref 1.5–2.5)
MCH RBC QN AUTO: 28.3 PG (ref 27–33)
MCHC RBC AUTO-ENTMCNC: 31.8 G/DL (ref 32.2–35.5)
MCV RBC AUTO: 88.9 FL (ref 81.4–97.8)
MONOCYTES # BLD AUTO: 0.62 K/UL (ref 0–0.85)
MONOCYTES NFR BLD AUTO: 6.1 % (ref 0–13.4)
NEUTROPHILS # BLD AUTO: 8.55 K/UL (ref 1.82–7.42)
NEUTROPHILS NFR BLD: 84.7 % (ref 44–72)
NRBC # BLD AUTO: 0 K/UL
NRBC BLD-RTO: 0 /100 WBC (ref 0–0.2)
PHOSPHATE SERPL-MCNC: 3.2 MG/DL (ref 2.5–4.5)
PLATELET # BLD AUTO: 190 K/UL (ref 164–446)
PMV BLD AUTO: 10.6 FL (ref 9–12.9)
POTASSIUM SERPL-SCNC: 3.4 MMOL/L (ref 3.6–5.5)
RBC # BLD AUTO: 3.68 M/UL (ref 4.2–5.4)
SIGNIFICANT IND 70042: ABNORMAL
SITE SITE: ABNORMAL
SODIUM SERPL-SCNC: 144 MMOL/L (ref 135–145)
SOURCE SOURCE: ABNORMAL
TRIGL SERPL-MCNC: 91 MG/DL (ref 0–149)
WBC # BLD AUTO: 10.1 K/UL (ref 4.8–10.8)

## 2025-05-24 PROCEDURE — 700111 HCHG RX REV CODE 636 W/ 250 OVERRIDE (IP): Mod: JZ | Performed by: STUDENT IN AN ORGANIZED HEALTH CARE EDUCATION/TRAINING PROGRAM

## 2025-05-24 PROCEDURE — A9270 NON-COVERED ITEM OR SERVICE: HCPCS | Performed by: INTERNAL MEDICINE

## 2025-05-24 PROCEDURE — 94150 VITAL CAPACITY TEST: CPT

## 2025-05-24 PROCEDURE — 85025 COMPLETE CBC W/AUTO DIFF WBC: CPT

## 2025-05-24 PROCEDURE — 700105 HCHG RX REV CODE 258: Performed by: STUDENT IN AN ORGANIZED HEALTH CARE EDUCATION/TRAINING PROGRAM

## 2025-05-24 PROCEDURE — 700111 HCHG RX REV CODE 636 W/ 250 OVERRIDE (IP): Mod: JZ | Performed by: INTERNAL MEDICINE

## 2025-05-24 PROCEDURE — 71045 X-RAY EXAM CHEST 1 VIEW: CPT

## 2025-05-24 PROCEDURE — 700102 HCHG RX REV CODE 250 W/ 637 OVERRIDE(OP): Performed by: INTERNAL MEDICINE

## 2025-05-24 PROCEDURE — 84100 ASSAY OF PHOSPHORUS: CPT

## 2025-05-24 PROCEDURE — 700105 HCHG RX REV CODE 258: Performed by: INTERNAL MEDICINE

## 2025-05-24 PROCEDURE — 83735 ASSAY OF MAGNESIUM: CPT

## 2025-05-24 PROCEDURE — 700102 HCHG RX REV CODE 250 W/ 637 OVERRIDE(OP): Performed by: OTOLARYNGOLOGY

## 2025-05-24 PROCEDURE — 99291 CRITICAL CARE FIRST HOUR: CPT | Performed by: INTERNAL MEDICINE

## 2025-05-24 PROCEDURE — 36600 WITHDRAWAL OF ARTERIAL BLOOD: CPT

## 2025-05-24 PROCEDURE — A9270 NON-COVERED ITEM OR SERVICE: HCPCS | Performed by: OTOLARYNGOLOGY

## 2025-05-24 PROCEDURE — 84478 ASSAY OF TRIGLYCERIDES: CPT

## 2025-05-24 PROCEDURE — 770022 HCHG ROOM/CARE - ICU (200)

## 2025-05-24 PROCEDURE — 80048 BASIC METABOLIC PNL TOTAL CA: CPT

## 2025-05-24 PROCEDURE — 700101 HCHG RX REV CODE 250: Mod: UD | Performed by: INTERNAL MEDICINE

## 2025-05-24 PROCEDURE — 94003 VENT MGMT INPAT SUBQ DAY: CPT

## 2025-05-24 RX ORDER — OXYCODONE HYDROCHLORIDE 5 MG/1
5 TABLET ORAL EVERY 4 HOURS PRN
Refills: 0 | Status: DISCONTINUED | OUTPATIENT
Start: 2025-05-24 | End: 2025-05-25 | Stop reason: HOSPADM

## 2025-05-24 RX ORDER — DIPHENHYDRAMINE HCL 25 MG
25 TABLET ORAL EVERY 6 HOURS PRN
Status: DISCONTINUED | OUTPATIENT
Start: 2025-05-24 | End: 2025-05-25 | Stop reason: HOSPADM

## 2025-05-24 RX ORDER — POLYETHYLENE GLYCOL 3350 17 G/17G
1 POWDER, FOR SOLUTION ORAL
Status: DISCONTINUED | OUTPATIENT
Start: 2025-05-24 | End: 2025-05-25 | Stop reason: HOSPADM

## 2025-05-24 RX ORDER — AMOXICILLIN 250 MG
2 CAPSULE ORAL EVERY EVENING
Status: DISCONTINUED | OUTPATIENT
Start: 2025-05-24 | End: 2025-05-25 | Stop reason: HOSPADM

## 2025-05-24 RX ORDER — AMOXICILLIN 250 MG
2 CAPSULE ORAL EVERY EVENING
Status: DISCONTINUED | OUTPATIENT
Start: 2025-05-24 | End: 2025-05-24

## 2025-05-24 RX ORDER — OXYCODONE HYDROCHLORIDE 5 MG/1
5 TABLET ORAL EVERY 4 HOURS PRN
Refills: 0 | Status: DISCONTINUED | OUTPATIENT
Start: 2025-05-24 | End: 2025-05-24

## 2025-05-24 RX ORDER — OXYCODONE HYDROCHLORIDE 10 MG/1
10 TABLET ORAL EVERY 4 HOURS PRN
Refills: 0 | Status: DISCONTINUED | OUTPATIENT
Start: 2025-05-24 | End: 2025-05-24

## 2025-05-24 RX ORDER — HYDROMORPHONE HYDROCHLORIDE 1 MG/ML
0.5 INJECTION, SOLUTION INTRAMUSCULAR; INTRAVENOUS; SUBCUTANEOUS EVERY 4 HOURS PRN
Status: DISCONTINUED | OUTPATIENT
Start: 2025-05-24 | End: 2025-05-25 | Stop reason: HOSPADM

## 2025-05-24 RX ORDER — PILOCARPINE HYDROCHLORIDE 5 MG/1
5 TABLET, FILM COATED ORAL 3 TIMES DAILY
Status: DISCONTINUED | OUTPATIENT
Start: 2025-05-24 | End: 2025-05-25

## 2025-05-24 RX ORDER — POLYETHYLENE GLYCOL 3350 17 G/17G
1 POWDER, FOR SOLUTION ORAL
Status: DISCONTINUED | OUTPATIENT
Start: 2025-05-24 | End: 2025-05-24

## 2025-05-24 RX ORDER — BACITRACIN ZINC 500 [USP'U]/G
OINTMENT TOPICAL 2 TIMES DAILY
Status: DISCONTINUED | OUTPATIENT
Start: 2025-05-24 | End: 2025-05-24

## 2025-05-24 RX ORDER — BACITRACIN ZINC 500 [USP'U]/G
OINTMENT TOPICAL 2 TIMES DAILY
Status: DISCONTINUED | OUTPATIENT
Start: 2025-05-24 | End: 2025-05-25 | Stop reason: HOSPADM

## 2025-05-24 RX ORDER — AMLODIPINE BESYLATE 5 MG/1
5 TABLET ORAL
Status: DISCONTINUED | OUTPATIENT
Start: 2025-05-24 | End: 2025-05-25 | Stop reason: HOSPADM

## 2025-05-24 RX ORDER — ACETAMINOPHEN 325 MG/1
650 TABLET ORAL EVERY 6 HOURS PRN
Status: DISCONTINUED | OUTPATIENT
Start: 2025-05-24 | End: 2025-05-25 | Stop reason: HOSPADM

## 2025-05-24 RX ORDER — OXYCODONE HYDROCHLORIDE 10 MG/1
10 TABLET ORAL EVERY 4 HOURS PRN
Refills: 0 | Status: DISCONTINUED | OUTPATIENT
Start: 2025-05-24 | End: 2025-05-25 | Stop reason: HOSPADM

## 2025-05-24 RX ADMIN — HYDROMORPHONE HYDROCHLORIDE 4 MG/HR: 10 INJECTION, SOLUTION INTRAMUSCULAR; INTRAVENOUS; SUBCUTANEOUS at 07:25

## 2025-05-24 RX ADMIN — BACITRACIN ZINC: 500 OINTMENT TOPICAL at 17:11

## 2025-05-24 RX ADMIN — PROPOFOL 50 MCG/KG/MIN: 10 INJECTION, EMULSION INTRAVENOUS at 00:45

## 2025-05-24 RX ADMIN — PILOCARPINE HYDROCHLORIDE 5 MG: 5 TABLET, FILM COATED ORAL at 17:11

## 2025-05-24 RX ADMIN — PILOCARPINE HYDROCHLORIDE 5 MG: 5 TABLET, FILM COATED ORAL at 11:45

## 2025-05-24 RX ADMIN — AMPICILLIN SODIUM, SULBACTAM SODIUM 3 G: 2; 1 INJECTION, POWDER, FOR SOLUTION INTRAMUSCULAR; INTRAVENOUS at 23:34

## 2025-05-24 RX ADMIN — SODIUM CHLORIDE 10 MG/HR: 9 INJECTION, SOLUTION INTRAVENOUS at 23:30

## 2025-05-24 RX ADMIN — LABETALOL HYDROCHLORIDE 10 MG: 5 INJECTION, SOLUTION INTRAVENOUS at 16:39

## 2025-05-24 RX ADMIN — FAMOTIDINE 20 MG: 20 TABLET, FILM COATED ORAL at 05:22

## 2025-05-24 RX ADMIN — HYDROMORPHONE HYDROCHLORIDE 1 MG: 1 INJECTION, SOLUTION INTRAMUSCULAR; INTRAVENOUS; SUBCUTANEOUS at 06:40

## 2025-05-24 RX ADMIN — PROPOFOL 40 MCG/KG/MIN: 10 INJECTION, EMULSION INTRAVENOUS at 06:17

## 2025-05-24 RX ADMIN — DEXAMETHASONE SODIUM PHOSPHATE 4 MG: 4 INJECTION, SOLUTION INTRAMUSCULAR; INTRAVENOUS at 01:06

## 2025-05-24 RX ADMIN — POTASSIUM BICARBONATE 50 MEQ: 978 TABLET, EFFERVESCENT ORAL at 08:51

## 2025-05-24 RX ADMIN — SODIUM CHLORIDE, POTASSIUM CHLORIDE, SODIUM LACTATE AND CALCIUM CHLORIDE: 600; 310; 30; 20 INJECTION, SOLUTION INTRAVENOUS at 06:16

## 2025-05-24 RX ADMIN — HYDROMORPHONE HYDROCHLORIDE 1 MG: 1 INJECTION, SOLUTION INTRAMUSCULAR; INTRAVENOUS; SUBCUTANEOUS at 04:54

## 2025-05-24 RX ADMIN — DEXAMETHASONE SODIUM PHOSPHATE 4 MG: 4 INJECTION, SOLUTION INTRAMUSCULAR; INTRAVENOUS at 17:11

## 2025-05-24 RX ADMIN — DEXAMETHASONE SODIUM PHOSPHATE 4 MG: 4 INJECTION, SOLUTION INTRAMUSCULAR; INTRAVENOUS at 05:22

## 2025-05-24 RX ADMIN — SODIUM CHLORIDE 5 MG/HR: 9 INJECTION, SOLUTION INTRAVENOUS at 18:15

## 2025-05-24 RX ADMIN — AMPICILLIN SODIUM, SULBACTAM SODIUM 3 G: 2; 1 INJECTION, POWDER, FOR SOLUTION INTRAMUSCULAR; INTRAVENOUS at 17:11

## 2025-05-24 RX ADMIN — AMPICILLIN SODIUM, SULBACTAM SODIUM 3 G: 2; 1 INJECTION, POWDER, FOR SOLUTION INTRAMUSCULAR; INTRAVENOUS at 11:42

## 2025-05-24 RX ADMIN — AMLODIPINE BESYLATE 5 MG: 10 TABLET ORAL at 17:11

## 2025-05-24 RX ADMIN — LINEZOLID 600 MG: 2 INJECTION, SOLUTION INTRAVENOUS at 06:16

## 2025-05-24 RX ADMIN — SODIUM CHLORIDE 10 MG/HR: 9 INJECTION, SOLUTION INTRAVENOUS at 20:55

## 2025-05-24 RX ADMIN — AMPICILLIN SODIUM, SULBACTAM SODIUM 3 G: 2; 1 INJECTION, POWDER, FOR SOLUTION INTRAMUSCULAR; INTRAVENOUS at 01:06

## 2025-05-24 RX ADMIN — DEXAMETHASONE SODIUM PHOSPHATE 4 MG: 4 INJECTION, SOLUTION INTRAMUSCULAR; INTRAVENOUS at 11:41

## 2025-05-24 RX ADMIN — HYDRALAZINE HYDROCHLORIDE 20 MG: 20 INJECTION INTRAMUSCULAR; INTRAVENOUS at 13:06

## 2025-05-24 RX ADMIN — AMPICILLIN SODIUM, SULBACTAM SODIUM 3 G: 2; 1 INJECTION, POWDER, FOR SOLUTION INTRAMUSCULAR; INTRAVENOUS at 05:23

## 2025-05-24 RX ADMIN — PILOCARPINE HYDROCHLORIDE 5 MG: 5 TABLET, FILM COATED ORAL at 05:22

## 2025-05-24 RX ADMIN — DEXAMETHASONE SODIUM PHOSPHATE 4 MG: 4 INJECTION, SOLUTION INTRAMUSCULAR; INTRAVENOUS at 23:34

## 2025-05-24 ASSESSMENT — PAIN DESCRIPTION - PAIN TYPE
TYPE: ACUTE PAIN

## 2025-05-24 ASSESSMENT — ENCOUNTER SYMPTOMS
SHORTNESS OF BREATH: 0
NAUSEA: 0
WEAKNESS: 0
FEVER: 0
ABDOMINAL PAIN: 0
DIARRHEA: 0
BACK PAIN: 0
VOMITING: 0
HEADACHES: 0

## 2025-05-24 ASSESSMENT — PULMONARY FUNCTION TESTS: FVC: 1.2

## 2025-05-24 NOTE — CARE PLAN
The patient is Watcher - Medium risk of patient condition declining or worsening    Shift Goals  Clinical Goals: Monitor airway  Patient Goals: TYLER  Family Goals: TYLER    Progress made toward(s) clinical / shift goals:      Problem: Knowledge Deficit - Standard  Goal: Patient and family/care givers will demonstrate understanding of plan of care, disease process/condition, diagnostic tests and medications  Outcome: Progressing     Problem: Hemodynamics  Goal: Patient's hemodynamics, fluid balance and neurologic status will be stable or improve  Outcome: Progressing     Problem: Fluid Volume  Goal: Fluid volume balance will be maintained  Outcome: Progressing     Problem: Urinary - Renal Perfusion  Goal: Ability to achieve and maintain adequate renal perfusion and functioning will improve  Outcome: Progressing     Problem: Mechanical Ventilation  Goal: Safe management of artificial airway and ventilation  Outcome: Progressing     Problem: Pain - Standard  Goal: Alleviation of pain or a reduction in pain to the patient’s comfort goal  Outcome: Progressing     Problem: Safety - Medical Restraint  Goal: Remains free of injury from restraints (Restraint for Interference with Medical Device)  Outcome: Progressing       Patient is not progressing towards the following goals:

## 2025-05-24 NOTE — CARE PLAN
Problem: Ventilation  Goal: Ability to achieve and maintain unassisted ventilation or tolerate decreased levels of ventilator support  Description: Target End Date:  4 days Document on Vent flowsheet1.  Support and monitor invasive and noninvasive mechanical ventilation2.  Monitor ventilator weaning response3.  Perform ventilator associated pneumonia prevention interventions4.  Manage ventilation therapy by monitoring diagnostic test results  Outcome: Not Met     Ventilator Daily Summary    Vent Day #10  Airway: 7.5@25    Ventilator settings: 14, 450, +8, 30%  Weaning trials: none  Respiratory Procedures: none    Plan: Continue current ventilator settings and wean mechanical ventilation as tolerated per physician orders.

## 2025-05-24 NOTE — CARE PLAN
Problem: Ventilation  Goal: Ability to achieve and maintain unassisted ventilation or tolerate decreased levels of ventilator support  Description: Target End Date:  4 days Document on Vent flowsheet1.  Support and monitor invasive and noninvasive mechanical ventilation2.  Monitor ventilator weaning response3.  Perform ventilator associated pneumonia prevention interventions4.  Manage ventilation therapy by monitoring diagnostic test results  5/24/2025 0531 by Katie Fang, RRT  Outcome: Not Met  5/24/2025 0527 by Katie Fang, RRT  Outcome: Not Met     Ventilator Daily Summary    Vent Day #3  Airway: 6.5@25    Ventilator settings: 16, 310, +8, 30%  Weaning trials: none  Respiratory Procedures: none    Plan: Continue current ventilator settings and wean mechanical ventilation as tolerated per physician orders.

## 2025-05-24 NOTE — CARE PLAN
The patient is Stable - Low risk of patient condition declining or worsening    Shift Goals  Clinical Goals: Monitor airway  Patient Goals: TYLER  Family Goals: Updates    Progress made toward(s) clinical / shift goals:    Problem: Knowledge Deficit - Standard  Goal: Patient and family/care givers will demonstrate understanding of plan of care, disease process/condition, diagnostic tests and medications  Outcome: Progressing     Problem: Hemodynamics  Goal: Patient's hemodynamics, fluid balance and neurologic status will be stable or improve  Outcome: Progressing     Problem: Urinary - Renal Perfusion  Goal: Ability to achieve and maintain adequate renal perfusion and functioning will improve  Outcome: Progressing     Problem: Pain - Standard  Goal: Alleviation of pain or a reduction in pain to the patient’s comfort goal  Outcome: Progressing     Problem: Skin Integrity  Goal: Skin integrity is maintained or improved  Outcome: Progressing     Problem: Fall Risk  Goal: Patient will remain free from falls  Outcome: Progressing     Problem: Respiratory:  Goal: Respiratory status will improve  Outcome: Progressing

## 2025-05-24 NOTE — PROGRESS NOTES
"Critical Care Progress Note    Date of admission  5/20/2025    Chief Complaint  25 y.o. female who presented 5/20/2025 with no chronic medical conditions, + tobacco abuse. That has presented to [facility]. Found to have submandibular abscess down to the right vallecula and transferred to [facility] for ENT consultation, patient managed on floor but failed conservative management and was taken to OR today By Dr Winchester for I and D. She was left intubated with a 6.5 ET and will required post operative monitoring in ICU. Hx is limited secondary to being intubated. \" Dr White's note    Hospital Course  5/22 admitted to ICU postop    Interval Problem Update  Reviewed last 24 hour events:  No acute changes overnight  Right neck swelling appears much improved.   Pt was on propofol and dilaudid gtt to keep her comfortable.   Pt did well on SAT, SBT, subsequently extubated.   HR in 40-80s  SBP in 110-120s  WBC 12  Hgb 10.4, platelet 190  Creatinine 0.68-0.73    On unasyn and linezolid  Linezolid was discontinued. MRSA was discontinued    Review of Systems  Review of Systems   Constitutional:  Negative for fever and malaise/fatigue.   Respiratory:  Negative for shortness of breath.    Cardiovascular:  Negative for chest pain.   Gastrointestinal:  Negative for abdominal pain, diarrhea, nausea and vomiting.   Musculoskeletal:  Negative for back pain.   Neurological:  Negative for weakness and headaches.        Vital Signs for last 24 hours   Temp:  [36.5 °C (97.7 °F)-36.7 °C (98 °F)] 36.7 °C (98 °F)  Pulse:  [46-88] 86  Resp:  [7-33] 33  BP: (108-163)/() 148/60  SpO2:  [96 %-100 %] 96 %    Hemodynamic parameters for last 24 hours       Respiratory Information for the last 24 hours  Vent Mode: APVCMV  Rate (breaths/min): 16  Vt Target (mL): 310  PEEP/CPAP: 8  MAP: 10  Control VTE (exp VT): 310    Physical Exam   Physical Exam  Vitals and nursing note reviewed.   Constitutional:       General: She is not in acute " distress.     Appearance: She is obese. She is ill-appearing and toxic-appearing. She is not diaphoretic.      Comments: RASS 0   HENT:      Head: Normocephalic and atraumatic.      Mouth/Throat:      Mouth: Mucous membranes are dry.      Comments: ET tube in place  Cardiovascular:      Rate and Rhythm: Normal rate and regular rhythm.      Pulses: Normal pulses.      Heart sounds: Normal heart sounds. No murmur heard.  Pulmonary:      Effort: No respiratory distress.      Breath sounds: Normal breath sounds. No wheezing, rhonchi or rales.   Abdominal:      General: There is no distension.      Palpations: Abdomen is soft.      Tenderness: There is no abdominal tenderness. There is no guarding.   Musculoskeletal:      Cervical back: Neck supple.      Right lower leg: No edema.      Left lower leg: No edema.   Skin:     Capillary Refill: Capillary refill takes less than 2 seconds.      Coloration: Skin is not jaundiced.      Findings: No bruising or rash.   Neurological:      General: No focal deficit present.      Mental Status: She is oriented to person, place, and time.         Medications  Current Medications[1]    Fluids    Intake/Output Summary (Last 24 hours) at 5/24/2025 1247  Last data filed at 5/24/2025 1000  Gross per 24 hour   Intake 3558.14 ml   Output 1505 ml   Net 2053.14 ml       Laboratory  Recent Labs     05/22/25  1507 05/23/25  0436   ISTATAPH 7.354 7.492*   ISTATAPCO2 51.1* 32.5   ISTATAPO2 93 110*   ISTATATCO2 30* 26*   GSNGIOW1LSK 97 99   ISTATARTHCO3 28.5* 24.9   ISTATARTBE 2 2   ISTATTEMP 97.4 F 97.6 F   ISTATFIO2 30 30   ISTATSPEC Arterial Arterial   ISTATAPHTC 7.364 7.501*   LPBECWRH0VF 89 107         Recent Labs     05/22/25  0101 05/23/25  0531 05/24/25  0408   SODIUM 141 139 144   POTASSIUM 3.7 3.9 3.4*   CHLORIDE 107 105 109   CO2 23 22 24   BUN 12 9 10   CREATININE 0.68 0.73 0.82   MAGNESIUM  --  2.0 2.5   PHOSPHORUS  --  3.8 3.2   CALCIUM 8.4* 8.3* 8.2*     Recent Labs      05/22/25  0101 05/23/25  0531 05/24/25  0408   GLUCOSE 94 117* 130*     Recent Labs     05/22/25  0101 05/23/25  0531 05/24/25  0408   WBC 15.2* 12.2* 10.1   NEUTSPOLYS  --  91.60* 84.70*   LYMPHOCYTES  --  5.20* 8.30*   MONOCYTES  --  2.40 6.10   EOSINOPHILS  --  0.00 0.00   BASOPHILS  --  0.10 0.10     Recent Labs     05/22/25  0101 05/23/25  0531 05/24/25  0408   RBC 3.76* 3.86* 3.68*   HEMOGLOBIN 10.7* 10.9* 10.4*   HEMATOCRIT 32.9* 34.7* 32.7*   PLATELETCT 167 176 190       Imaging  X-Ray:  I have personally reviewed the images and compared with prior images.  CT:    Reviewed    Assessment/Plan  * Jl angina  Assessment & Plan  S/p I &D abscess in floor of mouth by ENT on 5/21  Intra-op culture negative to date.   Follow up culture  Continue unasyn, treat for 7 days from date of I&D on 5/21 (end date 5/27)        Encounter for weaning from ventilator (HCC)  Assessment & Plan  Intubated date: 5/22 with glide scope in OR w/ 6.5 ET  Sedated with propofol and dilaudid  Swelling of right neck has decreased  Pt was SAT/SBT and extubated  Early mobility   Complete 2 day course of dexamethasone    Sialadenitis- (present on admission)  Assessment & Plan  Progressively worsening requiring surgery 5/22 By Dr Winchester   Appreciate ENT recommendation  Follow up cultures  Continue Salagen per ENT  Continue Unasyn   Nasal MRSA nare negative.          VTE:  Lovenox  Ulcer: H2 Antagonist  Lines: Central Line  Ongoing indication addressed, Arterial Line  Ongoing indication addressed, and Ribera Catheter  Ongoing indication addressed    I have performed a physical exam and reviewed and updated ROS and Plan today (5/24/2025). In review of yesterday's note (5/23/2025), there are no changes except as documented above.     Discussed patient condition and risk of morbidity and/or mortality with RN, RT, Pharmacy, Charge nurse / hot rounds, and Patient  The patient remains critically ill.  Critical care time = 65 minutes in directly  providing and coordinating critical care and extensive data review.  No time overlap and excludes procedures.       [1]   Current Facility-Administered Medications   Medication Dose Route Frequency Provider Last Rate Last Admin    HYDROmorphone (Dilaudid) injection 0.5 mg  0.5 mg Intravenous Q4HRS PRN William Morales D.O.        acetaminophen (Tylenol) tablet 650 mg  650 mg Oral Q6HRS PRN William Morales D.O.        diphenhydrAMINE (Benadryl) tablet/capsule 25 mg  25 mg Oral Q6HRS PRN William Morales D.O.        oxyCODONE immediate-release (Roxicodone) tablet 5 mg  5 mg Oral Q4HRS PRN William Morales D.O.        Or    oxyCODONE immediate release (Roxicodone) tablet 10 mg  10 mg Oral Q4HRS PRN William Morales, D.O.        pilocarpine (Salagen) tablet 5 mg  5 mg Oral TID William Morales D.O.        senna-docusate (Pericolace Or Senokot S) 8.6-50 MG per tablet 2 Tablet  2 Tablet Oral Q EVENING William Mroales D.O.        And    polyethylene glycol/lytes (Miralax) Packet 1 Packet  1 Packet Oral QDAY PRN William Morales D.O.        dexamethasone (Decadron) injection 4 mg  4 mg Intravenous Q6HRS William Morales D.O.   4 mg at 05/24/25 1141    enoxaparin (Lovenox) inj 40 mg  40 mg Subcutaneous DAILY AT 1800 William Morales D.O.   40 mg at 05/23/25 1711    hydrALAZINE (Apresoline) injection 20 mg  20 mg Intravenous Q6HRS PRN Alhaji Booth M.D.   20 mg at 05/23/25 1020    Respiratory Therapy Consult   Nebulization Continuous RT Brian White M.D.        MD Alert...ICU Electrolyte Replacement per Pharmacy   Other PHARMACY TO DOSE Brian White M.D.        ampicillin/sulbactam (Unasyn) 3 g in  mL IVPB  3 g Intravenous Q6HRS Ramone Doyle M.D.   Stopped at 05/24/25 1212    ondansetron (Zofran) syringe/vial injection 4 mg  4 mg Intravenous Q4HRS PRN Ramone Doyle M.D.   4 mg at 05/22/25 0609    labetalol (Normodyne/Trandate) injection 10 mg  10 mg Intravenous Q4HRS PRN Ramone Doyle M.D.   10 mg at 05/23/25 0913

## 2025-05-24 NOTE — PROGRESS NOTES
"Mare Eason is a 25 y.o. female patient.    1. Sepsis, due to unspecified organism, unspecified whether acute organ dysfunction present ([facility])    2. Sialadenitis      Past Medical History[1]    Scheduled Meds:pilocarpine, 5 mg, Oral, TID  senna-docusate, 2 Tablet, Oral, Q EVENING  dexamethasone, 4 mg, Intravenous, Q6HRS  enoxaparin (LOVENOX) injection, 40 mg, Subcutaneous, DAILY AT 1800  MD Alert...Adult ICU Electrolyte Replacement per Pharmacy, , Other, PHARMACY TO DOSE  ampicillin-sulbactam (UNASYN) IV, 3 g, Intravenous, Q6HRS    Continuous Infusions:[unfilled]PRN Meds:PRN medications: HYDROmorphone, acetaminophen, diphenhydrAMINE, oxyCODONE immediate-release **OR** oxyCODONE immediate-release, senna-docusate **AND** polyethylene glycol/lytes, hydrALAZINE, Respiratory Therapy Consult, ondansetron, labetalol    Allergies[2]  Principal Problem:    Jl angina  Active Problems:    Sialadenitis    Metabolic acidosis    Hypocalcemia    Encounter for weaning from ventilator ([facility])    BP (!) 180/108   Pulse 66   Temp 36.7 °C (98 °F) (Temporal)   Resp (!) 21   Ht 1.575 m (5' 2\")   Wt 60.3 kg (132 lb 15 oz)   SpO2 100%     Subjective:   extubated this am. Pain controlled. Passed bedside swallow. Penrose fell out      Objective:  Vital signs: (most recent) BP (!) 180/108   Pulse 66   Temp 36.7 °C (98 °F) (Temporal)   Resp (!) 21   Ht 1.575 m (5' 2\")   Wt 60.3 kg (132 lb 15 oz)   SpO2 100%    Neck much less swollen, now minimal. Penrose stitch cut and removed.   Tongue and floor of mouth soft and flat  Breathing comfortably, no stridor    Assessment & Plan:  S/P I&D Jl's angina.  Continue current care  Extubated, breathing comfortably.   Ok to advance diet as tolerated  Recommend bacitracin and gauze dressing to neck incision    Jenniffer Cano M.D.  [phone]       [1]   Past Medical History:  Diagnosis Date    Encounter for other contraceptive management 1/4/2024   [2] No Known " Allergies

## 2025-05-25 ENCOUNTER — PHARMACY VISIT (OUTPATIENT)
Dept: PHARMACY | Facility: MEDICAL CENTER | Age: 25
End: 2025-05-25
Payer: COMMERCIAL

## 2025-05-25 VITALS
RESPIRATION RATE: 18 BRPM | BODY MASS INDEX: 23.33 KG/M2 | HEIGHT: 62 IN | OXYGEN SATURATION: 100 % | DIASTOLIC BLOOD PRESSURE: 117 MMHG | SYSTOLIC BLOOD PRESSURE: 176 MMHG | HEART RATE: 59 BPM | WEIGHT: 126.76 LBS | TEMPERATURE: 97.3 F

## 2025-05-25 PROBLEM — I10 HYPERTENSION: Status: ACTIVE | Noted: 2025-05-25

## 2025-05-25 PROBLEM — J96.01 ACUTE RESPIRATORY FAILURE WITH HYPOXIA (HCC): Status: ACTIVE | Noted: 2025-05-22

## 2025-05-25 LAB
ANION GAP SERPL CALC-SCNC: 13 MMOL/L (ref 7–16)
BACTERIA BLD CULT: NORMAL
BACTERIA BLD CULT: NORMAL
BACTERIA SPEC ANAEROBE CULT: NORMAL
BASOPHILS # BLD AUTO: 0 % (ref 0–1.8)
BASOPHILS # BLD: 0 K/UL (ref 0–0.12)
BUN SERPL-MCNC: 6 MG/DL (ref 8–22)
CALCIUM SERPL-MCNC: 8.5 MG/DL (ref 8.5–10.5)
CHLORIDE SERPL-SCNC: 105 MMOL/L (ref 96–112)
CO2 SERPL-SCNC: 23 MMOL/L (ref 20–33)
CREAT SERPL-MCNC: 0.65 MG/DL (ref 0.5–1.4)
EOSINOPHIL # BLD AUTO: 0 K/UL (ref 0–0.51)
EOSINOPHIL NFR BLD: 0 % (ref 0–6.9)
ERYTHROCYTE [DISTWIDTH] IN BLOOD BY AUTOMATED COUNT: 47.7 FL (ref 35.9–50)
GFR SERPLBLD CREATININE-BSD FMLA CKD-EPI: 125 ML/MIN/1.73 M 2
GLUCOSE SERPL-MCNC: 109 MG/DL (ref 65–99)
HCT VFR BLD AUTO: 38.5 % (ref 37–47)
HGB BLD-MCNC: 12.3 G/DL (ref 12–16)
LYMPHOCYTES # BLD AUTO: 2.45 K/UL (ref 1–4.8)
LYMPHOCYTES NFR BLD: 22.9 % (ref 22–41)
MAGNESIUM SERPL-MCNC: 2.1 MG/DL (ref 1.5–2.5)
MANUAL DIFF BLD: NORMAL
MCH RBC QN AUTO: 28.3 PG (ref 27–33)
MCHC RBC AUTO-ENTMCNC: 31.9 G/DL (ref 32.2–35.5)
MCV RBC AUTO: 88.7 FL (ref 81.4–97.8)
MONOCYTES # BLD AUTO: 0.7 K/UL (ref 0–0.85)
MONOCYTES NFR BLD AUTO: 6.8 % (ref 0–13.4)
MORPHOLOGY BLD-IMP: NORMAL
NEUTROPHILS # BLD AUTO: 7.52 K/UL (ref 1.82–7.42)
NEUTROPHILS NFR BLD: 70.3 % (ref 44–72)
NRBC # BLD AUTO: 0 K/UL
NRBC BLD-RTO: 0 /100 WBC (ref 0–0.2)
OVALOCYTES BLD QL SMEAR: NORMAL
PHOSPHATE SERPL-MCNC: 2.8 MG/DL (ref 2.5–4.5)
PLATELET # BLD AUTO: 226 K/UL (ref 164–446)
PLATELET BLD QL SMEAR: NORMAL
PMV BLD AUTO: 10.7 FL (ref 9–12.9)
POIKILOCYTOSIS BLD QL SMEAR: NORMAL
POTASSIUM SERPL-SCNC: 3 MMOL/L (ref 3.6–5.5)
RBC # BLD AUTO: 4.34 M/UL (ref 4.2–5.4)
RBC BLD AUTO: PRESENT
SIGNIFICANT IND 70042: NORMAL
SITE SITE: NORMAL
SODIUM SERPL-SCNC: 141 MMOL/L (ref 135–145)
SOURCE SOURCE: NORMAL
WBC # BLD AUTO: 10.7 K/UL (ref 4.8–10.8)
WBC TOXIC VACUOLES BLD QL SMEAR: NORMAL

## 2025-05-25 PROCEDURE — 700111 HCHG RX REV CODE 636 W/ 250 OVERRIDE (IP): Performed by: STUDENT IN AN ORGANIZED HEALTH CARE EDUCATION/TRAINING PROGRAM

## 2025-05-25 PROCEDURE — 700102 HCHG RX REV CODE 250 W/ 637 OVERRIDE(OP): Performed by: INTERNAL MEDICINE

## 2025-05-25 PROCEDURE — 80048 BASIC METABOLIC PNL TOTAL CA: CPT

## 2025-05-25 PROCEDURE — 85007 BL SMEAR W/DIFF WBC COUNT: CPT

## 2025-05-25 PROCEDURE — A9270 NON-COVERED ITEM OR SERVICE: HCPCS | Performed by: HOSPITALIST

## 2025-05-25 PROCEDURE — 700111 HCHG RX REV CODE 636 W/ 250 OVERRIDE (IP): Performed by: INTERNAL MEDICINE

## 2025-05-25 PROCEDURE — 84100 ASSAY OF PHOSPHORUS: CPT

## 2025-05-25 PROCEDURE — RXMED WILLOW AMBULATORY MEDICATION CHARGE: Performed by: HOSPITALIST

## 2025-05-25 PROCEDURE — 85027 COMPLETE CBC AUTOMATED: CPT

## 2025-05-25 PROCEDURE — 700102 HCHG RX REV CODE 250 W/ 637 OVERRIDE(OP): Performed by: HOSPITALIST

## 2025-05-25 PROCEDURE — 700105 HCHG RX REV CODE 258: Performed by: STUDENT IN AN ORGANIZED HEALTH CARE EDUCATION/TRAINING PROGRAM

## 2025-05-25 PROCEDURE — 99233 SBSQ HOSP IP/OBS HIGH 50: CPT | Performed by: INTERNAL MEDICINE

## 2025-05-25 PROCEDURE — 83735 ASSAY OF MAGNESIUM: CPT

## 2025-05-25 PROCEDURE — A9270 NON-COVERED ITEM OR SERVICE: HCPCS | Performed by: INTERNAL MEDICINE

## 2025-05-25 RX ORDER — POTASSIUM CHLORIDE 1500 MG/1
40 TABLET, EXTENDED RELEASE ORAL ONCE
Status: COMPLETED | OUTPATIENT
Start: 2025-05-25 | End: 2025-05-25

## 2025-05-25 RX ORDER — AMLODIPINE BESYLATE 5 MG/1
5 TABLET ORAL DAILY
Qty: 100 TABLET | Refills: 3 | Status: SHIPPED | OUTPATIENT
Start: 2025-05-26 | End: 2026-06-30

## 2025-05-25 RX ADMIN — POTASSIUM CHLORIDE 40 MEQ: 1500 TABLET, EXTENDED RELEASE ORAL at 09:45

## 2025-05-25 RX ADMIN — PILOCARPINE HYDROCHLORIDE 5 MG: 5 TABLET, FILM COATED ORAL at 06:13

## 2025-05-25 RX ADMIN — AMPICILLIN SODIUM, SULBACTAM SODIUM 3 G: 2; 1 INJECTION, POWDER, FOR SOLUTION INTRAMUSCULAR; INTRAVENOUS at 06:20

## 2025-05-25 RX ADMIN — AMPICILLIN SODIUM, SULBACTAM SODIUM 3 G: 2; 1 INJECTION, POWDER, FOR SOLUTION INTRAMUSCULAR; INTRAVENOUS at 12:54

## 2025-05-25 RX ADMIN — BACITRACIN ZINC: 500 OINTMENT TOPICAL at 06:13

## 2025-05-25 RX ADMIN — OXYCODONE 5 MG: 5 TABLET ORAL at 03:06

## 2025-05-25 RX ADMIN — AMLODIPINE BESYLATE 5 MG: 10 TABLET ORAL at 06:13

## 2025-05-25 RX ADMIN — LABETALOL HYDROCHLORIDE 10 MG: 5 INJECTION, SOLUTION INTRAVENOUS at 00:50

## 2025-05-25 RX ADMIN — OXYCODONE 5 MG: 5 TABLET ORAL at 08:28

## 2025-05-25 ASSESSMENT — FIBROSIS 4 INDEX: FIB4 SCORE: 0.76

## 2025-05-25 ASSESSMENT — ENCOUNTER SYMPTOMS
COUGH: 0
DIAPHORESIS: 0
FLANK PAIN: 0
PALPITATIONS: 0
VOMITING: 0
COUGH: 1
SENSORY CHANGE: 0
SHORTNESS OF BREATH: 0
SPUTUM PRODUCTION: 1
HALLUCINATIONS: 0
LOSS OF CONSCIOUSNESS: 0
SINUS PAIN: 0
NERVOUS/ANXIOUS: 0
ABDOMINAL PAIN: 0
BRUISES/BLEEDS EASILY: 0
NAUSEA: 0
EYE DISCHARGE: 0
CHILLS: 0
HEADACHES: 0
WHEEZING: 0
SEIZURES: 0
HEMOPTYSIS: 0
CLAUDICATION: 0
FOCAL WEAKNESS: 0
NECK PAIN: 0
CONSTIPATION: 0
STRIDOR: 0
SORE THROAT: 1
BLURRED VISION: 0
BACK PAIN: 0
EYE REDNESS: 0
FEVER: 0
MYALGIAS: 0
DIARRHEA: 0
DIZZINESS: 0
INSOMNIA: 0
EYE PAIN: 0
FALLS: 0
PND: 0

## 2025-05-25 ASSESSMENT — LIFESTYLE VARIABLES: SUBSTANCE_ABUSE: 0

## 2025-05-25 ASSESSMENT — PAIN DESCRIPTION - PAIN TYPE
TYPE: ACUTE PAIN
TYPE: ACUTE PAIN
TYPE: ACUTE PAIN;SURGICAL PAIN

## 2025-05-25 NOTE — PROGRESS NOTES
Patient stating she is wanting to leave the hospital, Dr. Tran notified. Patient educated on need to stay including receiving antibiotics and being able to consume fluids and eat. Patient still refusing to stay. Dr. Tran notified and new orders to discharge placed.

## 2025-05-25 NOTE — PROGRESS NOTES
[facility] Medicine Daily Progress Note    Date of Service  5/25/2025    Chief Complaint  Mare Eason is a 25 y.o. female admitted 5/20/2025 with facial swelling.    Hospital Course    Mare Eason is a 25 y.o. female without significant chronic medical conditions who presented 5/20/2025 with oral/face swelling.     Patient presented to [facility] today for swelling to submandibular area making it difficult for her to speak or swallow, swelling has been progressive over the past 3 days.  No distress, saturating well on room air, tolerating oral secretions.  She had a CT which showed enlarged right submandibular gland with infiltrative edema in the submandibular space extending to the right parotid inferiorly to the hyoid and piriform sinus as well as to the right vallecula adjacent to the epiglottis.  WBC 17, lactate was 3, she was given Decadron 10 mg, 3 g of Unasyn, 2 L IV fluid, Toradol, morphine transferred to [facility].  No headache or vision changes, no chest pain dyspnea cough vomiting abdominal pain dysuria diarrhea.  In the ED she is afebrile with normal pulse and respiratory rate systolic blood pressure 150s to 170s pulse ox 95 to 98% on room air.Leukocytosis 21.8, bicarb 19, calcium 8.4 alk phos 113 globulin 3.7 lactate 1.4.  Blood cultures were obtained.  ERP discussed with ENT Dr. Winchester recommends admission for IV antibiotics, warm compresses, sialagogues, fluids, will see patient in consult.    Interval Problem Update  Patient seen and examined today.  Data, Medication data reviewed.  Case discussed with nursing as available.  Plan of Care reviewed with patient and notified of changes.  5/22 patient with worsening throat swelling this morning.  Tongue is protruding slightly out of mouth, she reports ongoing throat pain as well.  Hypertensive, IV prn's given.  Patient with increased pain, pain regimen adjusted.  ENT concerned about Ludwigs angina. Plan to take patient to  the OR emergently for I+D.  Plan to monitor patient in the ICU post operatively given tenuous respiratory status.  Continue IV antibiotics, restarting IV steroids.  Appreciate ENT plan of care.  5/23-5/25 under the care of intensive care  5/25 the patient deemed stabilized to transfer to ICU level of care  The patient denies difficulty with swallowing, breathing, Penrose drain was removed,  Earlier afebrile, heart rate in the 70s, respiration unlabored, blood pressure 140s over 80s, patient required some nicardipine overnight for blood pressure control, this is currently improved, laboratory data white count 10 hemoglobin 12.3 platelet count 226 sodium 141 potassium 3 chloride 105 bicarb 23 glucose 109 BUN 6 creatinine 0.65  The patient remains on IV antibiotics, required replacement of potassium, ongoing monitoring  Advancing diet  I have discussed this patient's plan of care and discharge plan at IDT rounds today with Case Management, Nursing, Nursing leadership, and other members of the IDT team.    Consultants/Specialty  ENT  Critical care    Code Status  Full Code    Disposition  Medically Cleared  I have placed the appropriate orders for post-discharge needs.    Review of Systems  Review of Systems   Constitutional:  Negative for chills and fever.   HENT:          Sore floor of the mouth/chin   Eyes:  Negative for blurred vision and pain.   Respiratory:  Negative for cough and shortness of breath.    Cardiovascular:  Negative for chest pain, palpitations and leg swelling.   Gastrointestinal:  Negative for abdominal pain, nausea and vomiting.   Genitourinary:  Negative for dysuria and urgency.   Musculoskeletal:  Negative for back pain and falls.   Skin:  Negative for itching and rash.   Neurological:  Negative for dizziness, sensory change, focal weakness, loss of consciousness and headaches.   Psychiatric/Behavioral:  Negative for substance abuse. The patient does not have insomnia.         Physical  Exam  Temp:  [36.2 °C (97.1 °F)-37.3 °C (99.1 °F)] 37.3 °C (99.1 °F)  Pulse:  [] 75  Resp:  [7-33] 21  BP: (127-210)/() 140/87  SpO2:  [92 %-100 %] 95 %    Physical Exam  Vitals reviewed.   Constitutional:       General: She is not in acute distress.     Appearance: She is not diaphoretic.   HENT:      Head: Normocephalic and atraumatic.      Right Ear: External ear normal.      Left Ear: External ear normal.      Nose: Nose normal. No congestion.      Mouth/Throat:      Pharynx: No oropharyngeal exudate or posterior oropharyngeal erythema.      Comments: Submandibular swelling on right crossing midline to under chin  Dressing in place  Eyes:      Extraocular Movements: Extraocular movements intact.      Pupils: Pupils are equal, round, and reactive to light.   Cardiovascular:      Rate and Rhythm: Normal rate and regular rhythm.   Pulmonary:      Effort: Pulmonary effort is normal. No respiratory distress.      Breath sounds: Normal breath sounds. No stridor. No wheezing, rhonchi or rales.   Abdominal:      General: Bowel sounds are normal. There is no distension.      Palpations: Abdomen is soft.      Tenderness: There is no abdominal tenderness.   Musculoskeletal:         General: No swelling. Normal range of motion.      Cervical back: Normal range of motion and neck supple.      Right lower leg: No edema.      Left lower leg: No edema.   Skin:     General: Skin is warm and dry.   Neurological:      General: No focal deficit present.      Mental Status: She is alert and oriented to person, place, and time.      Cranial Nerves: No cranial nerve deficit.      Motor: No weakness.   Psychiatric:         Mood and Affect: Mood normal.         Behavior: Behavior normal.         Fluids    Intake/Output Summary (Last 24 hours) at 5/25/2025 0816  Last data filed at 5/25/2025 0300  Gross per 24 hour   Intake 1064.53 ml   Output 3775 ml   Net -2710.47 ml        Laboratory  Recent Labs     05/23/25  0527  "05/24/25  0408 05/25/25  0650   WBC 12.2* 10.1 10.7   RBC 3.86* 3.68* 4.34   HEMOGLOBIN 10.9* 10.4* 12.3   HEMATOCRIT 34.7* 32.7* 38.5   MCV 89.9 88.9 88.7   MCH 28.2 28.3 28.3   MCHC 31.4* 31.8* 31.9*   RDW 48.0 48.0 47.7   PLATELETCT 176 190 226   MPV 11.2 10.6 10.7     Recent Labs     05/23/25  0531 05/24/25  0408 05/25/25  0650   SODIUM 139 144 141   POTASSIUM 3.9 3.4* 3.0*   CHLORIDE 105 109 105   CO2 22 24 23   GLUCOSE 117* 130* 109*   BUN 9 10 6*   CREATININE 0.73 0.82 0.65   CALCIUM 8.3* 8.2* 8.5               Recent Labs     05/22/25  1456 05/24/25  0408   TRIGLYCERIDE 80 91       Imaging  DX-CHEST-PORTABLE (1 VIEW)   Final Result         1.  Left basilar atelectasis and/or infiltrates, new since prior study.      DX-ABDOMEN FOR TUBE PLACEMENT   Final Result         Feeding tube with tip projecting over the expected area of the stomach.      DX-CHEST-PORTABLE (1 VIEW)   Final Result         1.  No acute cardiopulmonary disease.      DX-CHEST-LIMITED (1 VIEW)   Final Result      1.  Well-positioned ETT.   2.  No focal consolidation or pleural effusions.      CT-OUTSIDE IMAGES-CT NECK   Final Result           Assessment/Plan  * Jl angina  Assessment & Plan  S/P I&D of submandibular abscess on 5/22  Cultures with Streptococcus constellatus  Continue ampicillin/sulbactam    Hypertension  Assessment & Plan  She is off nicardipine  Continue amlodipine, 5 mg daily    Acute respiratory failure with hypoxia (HCC)  Assessment & Plan  Intubated 5/22-5/24  Resolved    Sialadenitis- (present on admission)  Assessment & Plan  ERP consulted ENT, recommends antibiotics, sialagogues, will see patient in consult.  Mild odynophagia, tolerating oral secretions, no respiratory compromise.  Unasyn and pilocarpine ordered.  CT at OSH shows \"enlarged right submandibular gland with infiltrative edema in right submandibular space extending superiorly to the level of the right parotid gland inferiorly down to the right hyoid bone " "and piriform sinus area, edema also extends into the right vallecula adjacent to the epiglottis, fluid density noted in the right tongue base related to probable obstruction of the right submandibular duct, no drainable fluid collection or abscess.\"  S/p I&D by ENT, Penrose drain removed  Plan to monitor in ICU post operatively, currently improved to transfer back to the floor  Continue IV antibiotics, steroids, fluids    Plan  Continue to monitor closely in terms of the oral intake, tolerance  Then transition antibiotics to oral once the patient is improved  A.m. labs  Electrolyte balance, replacing potassium rechecking  Blood pressure control  See orders  Patient is has a high medical complexity, complex decision making and is at high risk for complication, morbidity, and mortality.  I spent 51 minutes, reviewing the chart, obtaining and/or reviewing separately obtained history. Performing a medically appropriate examination and evaluation.  Counseling and educating the patient. Ordering and reviewing medications, tests, or procedures.   Documenting clinical information in EPIC. Independently interpreting results and communicating results to patient. Discussing future disposition of care with patient, RN and case management.      VTE prophylaxis: Enoxaparin    I have performed a physical exam and reviewed and updated ROS and Plan today (5/25/2025). In review of yesterday's note (5/24/2025), there are no changes except as documented above.      Please note that this dictation was created using voice recognition software. I have made every reasonable attempt to correct obvious errors, but I expect that there are errors of grammar and possibly context that I did not discover before finalizing the note.      "

## 2025-05-25 NOTE — DISCHARGE SUMMARY
Discharge Summary    CHIEF COMPLAINT ON ADMISSION  Chief Complaint   Patient presents with    Oral Swelling     Transfer from [place] for enlarged submandibular gland that is making it difficult for her to speak or swallow. Swelling has been getting worse for past few days. Sating well on RA, NAD noted. Patient was given 1L NS, toradol, unasyn, decadron, zofran, morphine and labetalol PTA       Reason for Admission  Submandibular abscess, Jl's angina    Admission Date  5/20/2025    CODE STATUS  Full Code    HPI & HOSPITAL COURSE  Mare Eason is a 25 y.o. female without significant chronic medical conditions who presented 5/20/2025 with oral/face swelling.     Patient presented to [facility] today for swelling to submandibular area making it difficult for her to speak or swallow, swelling has been progressive over the past 3 days.  No distress, saturating well on room air, tolerating oral secretions.  She had a CT which showed enlarged right submandibular gland with infiltrative edema in the submandibular space extending to the right parotid inferiorly to the hyoid and piriform sinus as well as to the right vallecula adjacent to the epiglottis.  WBC 17, lactate was 3, she was given Decadron 10 mg, 3 g of Unasyn, 2 L IV fluid, Toradol, morphine transferred to [facility].  No headache or vision changes, no chest pain dyspnea cough vomiting abdominal pain dysuria diarrhea.  In the ED she is afebrile with normal pulse and respiratory rate systolic blood pressure 150s to 170s pulse ox 95 to 98% on room air.Leukocytosis 21.8, bicarb 19, calcium 8.4 alk phos 113 globulin 3.7 lactate 1.4.  Blood cultures were obtained.  ERP discussed with ENT Dr. Winchester recommends admission for IV antibiotics, warm compresses, sialagogues, fluids, will see patient in consult.  Hospital course chronologically  5/22 patient with worsening throat swelling this morning.  Tongue is protruding slightly out of mouth, she  reports ongoing throat pain as well.  Hypertensive, IV prn's given.  Patient with increased pain, pain regimen adjusted.  ENT concerned about Ludwigs angina. Plan to take patient to the OR emergently for I+D.  Plan to monitor patient in the ICU post operatively given tenuous respiratory status.  Continue IV antibiotics, restarting IV steroids.  Appreciate ENT plan of care.  5/23-5/25 under the care of intensive care  5/25 the patient deemed stabilized to transfer to ICU level of care  The patient denies difficulty with swallowing, breathing, Penrose drain was removed,  Earlier afebrile, heart rate in the 70s, respiration unlabored, blood pressure 140s over 80s, patient required some nicardipine overnight for blood pressure control, this is currently improved, laboratory data white count 10 hemoglobin 12.3 platelet count 226 sodium 141 potassium 3 chloride 105 bicarb 23 glucose 109 BUN 6 creatinine 0.65  The patient remains on IV antibiotics, required replacement of potassium, ongoing monitoring  Later in the day on 5/25 the patient demands to be discharged  We explained that this is premature, her electrolytes are abnormal and she should stay additional day to ensure that she is able to take nutrition and liquids adequately and her electrolytes stay stable  The patient wants to leave regardless  I am providing medication including antibiotics, so this antihypertensive regimen  The patient is instructed to hydrate well, and return to medical facility if she has further difficulties  Ongoing wound care as instructed    Therefore, she is discharged in fair and stable condition against medcial advice.    The patient met 2-midnight criteria for an inpatient stay at the time of discharge.    Discharge Date  5/25/2025    FOLLOW UP ITEMS POST DISCHARGE  Close follow-up with primary care to assure adequate healing    DISCHARGE DIAGNOSES  Principal Problem:    Jl angina (POA: Unknown)  Active Problems:    Sialadenitis  (POA: Yes)    Acute respiratory failure with hypoxia (HCC) (POA: Unknown)    Hypertension (POA: Unknown)  Resolved Problems:    * No resolved hospital problems. *          MEDICATIONS ON DISCHARGE     Medication List        START taking these medications        Instructions   amLODIPine 5 MG Tabs  Start taking on: May 26, 2025  Commonly known as: Norvasc   Take 1 Tablet by mouth every day.  Dose: 5 mg     amoxicillin-clavulanate 875-125 MG Tabs  Commonly known as: Augmentin   Take 1 Tablet by mouth 2 times a day for 7 days.  Dose: 1 Tablet            CONTINUE taking these medications        Instructions   ibuprofen 200 MG Tabs  Commonly known as: Motrin   Take 200 mg by mouth every 6 hours as needed for Mild Pain.  Dose: 200 mg     Nexplanon 68 MG Impl implant  Generic drug: etonogestrel   1 Each by Subdermal route one time.  Dose: 1 Each              Allergies  Allergies[1]    DIET  Orders Placed This Encounter   Procedures    Diet Order Diet: Regular     Standing Status:   Standing     Number of Occurrences:   1     Diet::   Regular [1]       ACTIVITY  As tolerated.  Weight bearing as tolerated    CONSULTATIONS  Critical care  ENT  PROCEDURES  Tamanna Winchester M.D.  Physician  Surgery Otolaryngology     OP Report     Unsigned Transcription     Date of Service: 5/22/2025  5:03 PM   suggestion   Draft: Not electronically signed.          DATE OF SERVICE:  05/22/2025      PREOPERATIVE DIAGNOSIS:  Sialadenitis with abscess.     POSTOPERATIVE DIAGNOSIS:  Jl's angina.     PROCEDURE:  Incision and drainage of neck infection and floor of mouth.     ATTENDING SURGEON:  Tamanna Winchester MD     ANESTHESIOLOGIST:  Brian Jiménez MD     COMPLICATIONS:  None.     SPECIMENS:  Wound culture.     PROCEDURE IN DETAIL:  The patient was appropriately identified and taken to   the operating room because of swelling of her neck and her tongue.  She was   initially given aerosolized lidocaine and then sprayed down.  A  flexible scope   was done to evaluate her airway through the left side of her nose.  The nasal   patches and nasopharynx were clear.  The oropharynx showed some exudate.  The   tongue base was slightly swollen.  She did have some retroflexion of the   epiglottis, but with deep breaths, it was easy to see the vocal cords at this   point and there was not collapse of the tongue base.  The scope was removed   and she was intubated per the anesthesiologist with the glide scope and then   she was secured in position.  The patient was then prepped and draped in a   sterile fashion.  A 1% lidocaine was injected under her neck at the maximum   point in the midline where the swelling was.  An incision was made in the   skin.  Bleeding was stopped using monopolar cautery.  Further dissection was   taken down the midline using monopolar cautery and then blunt hemostat was   used to dissect through the tissues.  The tissue was noted to be very inflamed   and stranded.  Dissection was carried bilaterally along the ___ glands and   then up towards the floor of the mouth entering a pocket of purulence.  This   was taken for culture.  Palpation was then also done of the mouth.  The tongue   was very firm as well as the right floor of the mouth.  Further dissection   was carried along the floor of the mouth opening up this pocket.  This was   irrigated out several times and then suctioned.  Further palpation of the   floor of the mouth, it felt much softer.  The area was irrigated again and   then a half-inch Penrose was placed in the wound.  This was secured into place   using a 3-0 silk suture.  The patient was left intubated and taken to the   ICU.                   LABORATORY  Lab Results   Component Value Date    SODIUM 141 05/25/2025    POTASSIUM 3.0 (L) 05/25/2025    CHLORIDE 105 05/25/2025    CO2 23 05/25/2025    GLUCOSE 109 (H) 05/25/2025    BUN 6 (L) 05/25/2025    CREATININE 0.65 05/25/2025        Lab Results   Component  Value Date    WBC 10.7 05/25/2025    HEMOGLOBIN 12.3 05/25/2025    HEMATOCRIT 38.5 05/25/2025    PLATELETCT 226 05/25/2025        Total time of the discharge process 35 minutes.          [1] No Known Allergies

## 2025-05-25 NOTE — CARE PLAN
The patient is Stable - Low risk of patient condition declining or worsening    Shift Goals  Clinical Goals: Monitor airway  Patient Goals: TYLER  Family Goals: Updates    Progress made toward(s) clinical / shift goals:      Problem: Hemodynamics  Goal: Patient's hemodynamics, fluid balance and neurologic status will be stable or improve  Outcome: Progressing  Note: Nicardipine off, BP within goal     Problem: Urinary - Renal Perfusion  Goal: Ability to achieve and maintain adequate renal perfusion and functioning will improve  Outcome: Progressing  Note: Good urine output     Problem: Respiratory  Goal: Patient will achieve/maintain optimum respiratory ventilation and gas exchange  Outcome: Progressing  Note: On room air, oxygen saturation high 90's.     Problem: Pain - Standard  Goal: Alleviation of pain or a reduction in pain to the patient’s comfort goal  Outcome: Progressing  Note: Medicated per MAR     Problem: Safety - Medical Restraint  Goal: Free from restraint(s) (Restraint for Interference with Medical Device)  Outcome: Progressing     Problem: Fall Risk  Goal: Patient will remain free from falls  Outcome: Progressing     Problem: Skin Integrity  Goal: Skin integrity is maintained or improved  Outcome: Progressing       Patient is not progressing towards the following goals:

## 2025-05-25 NOTE — DIETARY
"Nutrition services: Day 5 of admit.  Mare Eason is a 25 y.o. female with admitting DX of sialadenitis.    Consult received for tube feeding.  Pt was admitted to RICU, transferred to [facility] 5/25, then to [facility] this morning.  Enteral tube removed 5/24, regular diet ordered.  PO per flow sheet <25% for one documented meal thus far.  RD will add supplement order and adjust menu accordingly for pt to receive. Ensure Plus High Protein BID to optimize nutrition and bolster intake.     Assessment:  Height: 157.5 cm (5' 2\")  Weight: 57.5 kg (126 lb 12.2 oz)  Body mass index is 23.19 kg/m²., BMI classification: normal  Diet/Intake: Regular    Recommendations/Plan:  Ensure Plus HP TID.  Encourage intake of meals and supplements.  Document intake of all meals and supplements as % taken in ADLs to provide interdisciplinary communication across all shifts.   Monitor weight.  Nutrition rep will continue to see patient for ongoing meal and snack preferences.     RD to follow per dept guidelines.     "

## 2025-05-25 NOTE — PROGRESS NOTES
Per pt, she is allergic to steroids. Pt stated that her mouth swell up after receiving a dose of steroid. MD notified. OK to hold dexamethasone for now.

## 2025-05-25 NOTE — PROGRESS NOTES
Critical Care Progress Note    Date of admission  5/20/2025    Chief Complaint  25 y.o. female admitted 5/20/2025 with sial adenitis with abscess and Ludewig's angina.    Hospital Course      5/25 -    stop dexamethasone.  Continue ampicillin/sulbactam.  Continue amlodipine.  She is off nicardipine.      Interval Problem Update  Reviewed last 24 hour events:      SR  99.1  -2286 mL in the last 24  +3032 mL since admit    Mare feels better today.  She has a cough with scant sputum production.  She denies any dyspnea.  She has a wee bit of pain when she swallows.  She has no abdominal pain, nausea or vomiting.  She has no angina, palpitations or syncope.      Review of Systems  Review of Systems   Constitutional:  Negative for chills, diaphoresis, fever and malaise/fatigue.   HENT:  Positive for sore throat. Negative for congestion, ear discharge, ear pain, nosebleeds, sinus pain and tinnitus.    Eyes:  Negative for pain, discharge and redness.   Respiratory:  Positive for cough and sputum production. Negative for hemoptysis, shortness of breath, wheezing and stridor.    Cardiovascular:  Negative for chest pain, palpitations, claudication, leg swelling and PND.   Gastrointestinal:  Negative for abdominal pain, constipation, diarrhea, nausea and vomiting.   Genitourinary:  Negative for dysuria, flank pain, hematuria and urgency.   Musculoskeletal:  Negative for myalgias and neck pain.   Skin:  Negative for rash.   Neurological:  Negative for focal weakness, seizures and headaches.   Endo/Heme/Allergies:  Does not bruise/bleed easily.   Psychiatric/Behavioral:  Negative for hallucinations and substance abuse. The patient is not nervous/anxious.         Vital Signs for last 24 hours   Temp:  [36.2 °C (97.1 °F)-37.3 °C (99.1 °F)] 37.3 °C (99.1 °F)  Pulse:  [] 75  Resp:  [7-33] 21  BP: (127-210)/() 140/87  SpO2:  [92 %-100 %] 95 %    Hemodynamic parameters for last 24 hours       Respiratory Information for  the last 24 hours       Physical Exam   Physical Exam  Constitutional:       General: She is not in acute distress.     Appearance: She is not ill-appearing, toxic-appearing or diaphoretic.   HENT:      Head: Normocephalic and atraumatic.      Right Ear: External ear normal.      Left Ear: External ear normal.      Nose: Nose normal.      Mouth/Throat:      Mouth: Mucous membranes are moist.      Pharynx: Oropharynx is clear.   Eyes:      General: No scleral icterus.     Pupils: Pupils are equal, round, and reactive to light.   Neck:      Comments: She has a wee bit of swelling in her submandibular region  Cardiovascular:      Comments: Sinus rhythm  Pulmonary:      Effort: Pulmonary effort is normal. No respiratory distress.      Breath sounds: No stridor. No wheezing or rales.   Abdominal:      General: There is no distension.      Palpations: Abdomen is soft.      Tenderness: There is no abdominal tenderness. There is no guarding or rebound.   Musculoskeletal:         General: Normal range of motion.      Cervical back: Normal range of motion and neck supple. No rigidity.      Right lower leg: No edema.      Left lower leg: No edema.   Skin:     General: Skin is warm.      Capillary Refill: Capillary refill takes less than 2 seconds.   Neurological:      General: No focal deficit present.      Mental Status: She is alert and oriented to person, place, and time.      Cranial Nerves: No cranial nerve deficit.   Psychiatric:         Mood and Affect: Mood normal.         Behavior: Behavior normal.         Thought Content: Thought content normal.         Judgment: Judgment normal.         Medications  Current Medications[1]    Fluids    Intake/Output Summary (Last 24 hours) at 5/25/2025 0717  Last data filed at 5/25/2025 0300  Gross per 24 hour   Intake 1613.56 ml   Output 3900 ml   Net -2286.44 ml       Laboratory  Recent Labs     05/22/25  1507 05/23/25  0436   ISTATAPH 7.354 7.492*   ISTATAPCO2 51.1* 32.5    ISTATAPO2 93 110*   ISTATATCO2 30* 26*   LIEDQRZ6QCQ 97 99   ISTATARTHCO3 28.5* 24.9   ISTATARTBE 2 2   ISTATTEMP 97.4 F 97.6 F   ISTATFIO2 30 30   ISTATSPEC Arterial Arterial   ISTATAPHTC 7.364 7.501*   [id number] 89 107         Recent Labs     05/23/25  0531 05/24/25  0408   SODIUM 139 144   POTASSIUM 3.9 3.4*   CHLORIDE 105 109   CO2 22 24   BUN 9 10   CREATININE 0.73 0.82   MAGNESIUM 2.0 2.5   PHOSPHORUS 3.8 3.2   CALCIUM 8.3* 8.2*     Recent Labs     05/23/25  0531 05/24/25  0408   GLUCOSE 117* 130*     Recent Labs     05/23/25  0531 05/24/25  0408   WBC 12.2* 10.1   NEUTSPOLYS 91.60* 84.70*   LYMPHOCYTES 5.20* 8.30*   MONOCYTES 2.40 6.10   EOSINOPHILS 0.00 0.00   BASOPHILS 0.10 0.10     Recent Labs     05/23/25  0531 05/24/25  0408   RBC 3.86* 3.68*   HEMOGLOBIN 10.9* 10.4*   HEMATOCRIT 34.7* 32.7*   PLATELETCT 176 190       Imaging  None    Assessment/Plan  * Jl angina  Assessment & Plan  S/P I&D of submandibular abscess on 5/22  Cultures with Streptococcus constellatus  Continue ampicillin/sulbactam    Acute respiratory failure with hypoxia (HCC)  Assessment & Plan  Intubated 5/22-5/24  Resolved    Hypertension  Assessment & Plan  She is off nicardipine  Continue amlodipine, 5 mg daily    Sialadenitis- (present on admission)  Assessment & Plan  Continue ampicillin/sulbactam  Stop dexamethasone         VTE:  Lovenox  Ulcer: Not Indicated  Lines: None    I have performed a physical exam and reviewed and updated ROS and Plan today (5/25/2025). In review of yesterday's note (5/24/2025), there are no changes except as documented above.     OK to transfer out of ICU.  [facility] will sign off.  Please call if you have any questions.    Discussed patient condition and risk of morbidity and/or mortality with RN, RT, and Pharmacy    Al Randle MD  Pulmonary and Critical Care Medicine         [1]   Current Facility-Administered Medications   Medication Dose Route Frequency Provider Last  Rate Last Admin    HYDROmorphone (Dilaudid) injection 0.5 mg  0.5 mg Intravenous Q4HRS PRN William Morales D.O.        acetaminophen (Tylenol) tablet 650 mg  650 mg Oral Q6HRS PRN William Morales, D.O.        diphenhydrAMINE (Benadryl) tablet/capsule 25 mg  25 mg Oral Q6HRS PRN William Morales, D.O.        oxyCODONE immediate-release (Roxicodone) tablet 5 mg  5 mg Oral Q4HRS PRN William Morales, D.O.   5 mg at 05/25/25 0306    Or    oxyCODONE immediate release (Roxicodone) tablet 10 mg  10 mg Oral Q4HRS PRN William Morales D.O.        pilocarpine (Salagen) tablet 5 mg  5 mg Oral TID William Morales D.O.   5 mg at 05/25/25 0613    senna-docusate (Pericolace Or Senokot S) 8.6-50 MG per tablet 2 Tablet  2 Tablet Oral Q EVENING William Morales D.O.        And    polyethylene glycol/lytes (Miralax) Packet 1 Packet  1 Packet Oral QDAY PRN William Morales D.O.        bacitracin ointment   Topical BID Jenniffer Cano M.D.   Given at 05/25/25 0613    amLODIPine (Norvasc) tablet 5 mg  5 mg Oral Q DAY William Morales D.O.   5 mg at 05/25/25 0613    enoxaparin (Lovenox) inj 40 mg  40 mg Subcutaneous DAILY AT 1800 William Morales D.O.   40 mg at 05/23/25 1711    hydrALAZINE (Apresoline) injection 20 mg  20 mg Intravenous Q6HRS PRN Alhaji Booth M.D.   20 mg at 05/24/25 1306    Respiratory Therapy Consult   Nebulization Continuous RT Brian White M.D.        ampicillin/sulbactam (Unasyn) 3 g in  mL IVPB  3 g Intravenous Q6HRS Ramone Doyle M.D.   Stopped at 05/25/25 0650    ondansetron (Zofran) syringe/vial injection 4 mg  4 mg Intravenous Q4HRS PRN Ramone Doyle M.D.   4 mg at 05/22/25 0609

## 2025-05-25 NOTE — CARE PLAN
Problem: Knowledge Deficit - Standard  Goal: Patient and family/care givers will demonstrate understanding of plan of care, disease process/condition, diagnostic tests and medications  Outcome: Progressing   The patient is Stable - Low risk of patient condition declining or worsening    Shift Goals  Clinical Goals: Monitor airway  Patient Goals: TYLER  Family Goals: Updates    Progress made toward(s) clinical / shift goals:  patient involved in plan of care    Patient is not progressing towards the following goals:   N/a

## 2025-05-25 NOTE — PROGRESS NOTES
4 Eyes Skin Assessment Completed by GISELA Sears and GISELA Friend.    Skin assessment is primarily focused on high risk bony prominences. Pay special attention to skin beneath and around medical devices, high risk bony prominences, skin to skin areas and areas where the patient lacks sensation to feel pain and areas where the patient previously had breakdown.     Head (Occipital):  WDL   Ears (Under Medical Devices): WDL   Nose (Under Medical Devices): WDL   Mouth:  Edema   Neck: Edema and Incision   Breast/Chest:  WDL   Shoulder Blades:  WDL   Spine:   WDL   (R) Arm/Elbow/Hand: WDL   (L) Arm/Elbow/Hand: WDL   Abdomen: WDL   Pannus/Groin:  WDL   Sacrum/Coccyx:   WDL   (R) Ischial Tuberosity (Sit Bones):  WDL   (L) Ischial Tuberosity (Sit Bones):  WDL   (R) Leg:  WDL   (L) Leg:  WDL   (R) Heel:  WDL   (R) Foot/Toe: WDL   (L) Heel: WDL   (L) Foot/Toe:  WDL       DEVICES IN USE:   Respiratory Devices:  NA, patient on room air  Feeding Devices:  N/A   Lines & BP Monitoring Devices:  Peripheral IV, BP cuff, and Pulse ox    Orthopedic Devices:  N/A  Miscellaneous Devices:  N/A    PROTOCOL INTERVENTIONS:   ICU Low Airloss Bed:  Already in place  Float Heels with Pillows:  Already in place    WOUND PHOTOS:   N/A no wounds identified    WOUND CONSULT:   N/A, no advanced wound care needs identified

## 2025-05-29 LAB
FUNGUS SPEC CULT: NORMAL
FUNGUS SPEC FUNGUS STN: NORMAL
SIGNIFICANT IND 70042: NORMAL
SITE SITE: NORMAL
SOURCE SOURCE: NORMAL

## 2025-07-01 NOTE — ED NOTES
As evidenced by EGD biopsy findings including reactive gastropathy and minimal chronic inflammation in the stomach and esophageal mucosa with reactive changes in both the distal and proximal esophagus.  Will start a short course of acid suppression with omeprazole 20 mg daily for 8 weeks.  Recommend avoidance of spicy and acidic foods and drinks in patient's diet for 2-3 months, along with not taking meals within 2 hours before bedtime.          Discharged home in good condition with prescriptions and follow up instructions, pt verbalizes understanding of all, ambulates out with steady gait accompanied by self.

## 2025-08-04 ENCOUNTER — OFFICE VISIT (OUTPATIENT)
Dept: OBGYN | Facility: CLINIC | Age: 25
End: 2025-08-04
Payer: MEDICAID

## 2025-08-04 VITALS
WEIGHT: 136.2 LBS | SYSTOLIC BLOOD PRESSURE: 129 MMHG | BODY MASS INDEX: 25.06 KG/M2 | HEIGHT: 62 IN | DIASTOLIC BLOOD PRESSURE: 67 MMHG

## 2025-08-04 DIAGNOSIS — Z30.46 ENCOUNTER FOR NEXPLANON REMOVAL: Primary | ICD-10-CM

## 2025-08-04 ASSESSMENT — FIBROSIS 4 INDEX: FIB4 SCORE: 0.64

## (undated) DEVICE — CANISTER SUCTION 3000ML MECHANICAL FILTER AUTO SHUTOFF MEDI-VAC NONSTERILE LF DISP  (40EA/CA)

## (undated) DEVICE — CATHETER IV NON-SAFETY 18 GA X 1 1/4 (50/BX 4BX/CA)

## (undated) DEVICE — WATER IRRIGATION STERILE 1000ML (12EA/CA)

## (undated) DEVICE — TOWEL STOP TIMEOUT SAFETY FLAG (40EA/CA)

## (undated) DEVICE — CANISTER SUCTION 3000ML MECHANICAL FILTER AUTO SHUTOFF MEDI-VAC NONSTERILE LF DISP (40EA/CA)

## (undated) DEVICE — SUTURE ABSORBABLE MONOFILAMENT VIOLET MONOCRYL CT-1 L36 IN (36EA/BX)

## (undated) DEVICE — BLANKET WARMING LOWER BODY (10EA/CA)

## (undated) DEVICE — GLOVE BIOGEL SZ 7 SURGICAL PF LTX - (50PR/BX 4BX/CA)

## (undated) DEVICE — MASK OXYGEN VNYL ADLT MED CONC WITH 7 FOOT TUBING - (50EA/CA)

## (undated) DEVICE — SUTURE 2-0 CHROMIC GUT CT-1 27 (36PK/BX)"

## (undated) DEVICE — SUTURE 0 VICRYL PLUS CT-1 - 36 INCH (36/BX)

## (undated) DEVICE — SUTURE 3-0 VICRYL PLUS CT-1 - 36 INCH (36/BX)

## (undated) DEVICE — CLOSURE SKIN STRIP 1/2 X 4 IN - (STERI STRIP) (50/BX 4BX/CA)

## (undated) DEVICE — DRESSING POST OP BORDER 4 X 10 (5EA/BX)

## (undated) DEVICE — TUBING CLEARLINK DUO-VENT - C-FLO (48EA/CA)

## (undated) DEVICE — CHLORAPREP 26 ML APPLICATOR - ORANGE TINT(25/CA)

## (undated) DEVICE — TRAY BLADDER CARE W/ 16 FR FOLEY CATHETER STATLOCK  (10/CA)

## (undated) DEVICE — SODIUM CHL IRRIGATION 0.9% 1000ML (12EA/CA)

## (undated) DEVICE — DRAIN PENROSE 3/8 IN X 12 IN - STERILE (25EA/BX)

## (undated) DEVICE — SUTURE 4-0 MONOCRYL PLUS PS-1 - 27 INCH (36/BX)

## (undated) DEVICE — HEAD HOLDER JUNIOR/ADULT

## (undated) DEVICE — SUCTION INSTRUMENT YANKAUER BULBOUS TIP W/O VENT (50EA/CA)

## (undated) DEVICE — TRAY SPINAL ANESTHESIA NON-SAFETY (10/CA)

## (undated) DEVICE — STERI STRIP COMPOUND BENZOIN - TINCTURE 0.6ML WITH APPLICATOR (40EA/BX)

## (undated) DEVICE — SOLUTION PLASMA-LYTE PH 7.4 INJ 1000ML  (14EA/CA)

## (undated) DEVICE — SLEEVE, SEQUENTIAL CALF REG

## (undated) DEVICE — TUBE CONNECTING SUCTION - CLEAR PLASTIC STERILE 72 IN (50EA/CA)

## (undated) DEVICE — RETRACTOR O C SECTION LRY - (5/BX)

## (undated) DEVICE — SUTURE 2-0 MONOCRYL CT-1

## (undated) DEVICE — BLANKET UNDERBODY FULL ACCES - (5/CA)

## (undated) DEVICE — PACK C-SECTION (2EA/CA)

## (undated) DEVICE — PACK ROOM TURNOVER L&D (12/CA)

## (undated) DEVICE — CANISTER SUCTION RIGID RED 1500CC (40EA/CA)

## (undated) DEVICE — DETERGENT RENUZYME PLUS 10 OZ PACKET (50/BX)

## (undated) DEVICE — SPONGE GAUZESTER 4 X 4 4PLY - (128PK/CA)

## (undated) DEVICE — GLOVE BIOGEL SZ 6 PF LATEX - (50EA/BX 4BX/CA)

## (undated) DEVICE — TRAY CATHETER FOLEY URINE METER W/STATLOCK 350ML (10EA/CA)

## (undated) DEVICE — SUTURE 3-0 MONOCRYL CT-1 ABS - 36IN (36/BX)

## (undated) DEVICE — STAPLER SKIN DISP - (6/BX 10BX/CA) VISISTAT

## (undated) DEVICE — ELECTRODE DUAL RETURN W/ CORD - (50/PK)

## (undated) DEVICE — SENSOR OXIMETER ADULT SPO2 RD SET (20EA/BX)

## (undated) DEVICE — KIT  I.V. START (100EA/CA)

## (undated) DEVICE — SHEATH RO 4F 10CM (10EA/BX)

## (undated) DEVICE — PENCIL ELECTSURG 10FT HLSTR - WITH BLADE (50EA/CA)

## (undated) DEVICE — PACK ENT OR - (6EA/CA)

## (undated) DEVICE — BRONCHOSCOPE VIDEO GLIDESCOPE BFLEX OD3.8 MM (5EA/PK)

## (undated) DEVICE — WATER IRRIG. STER. 1500 ML - (9/CA)

## (undated) DEVICE — TUBE E-T HI-LO CUFF 6.5MM (10EA/BX)

## (undated) DEVICE — DRESSING INTERCEED ABSORBABLE ADHESION BARRIER TC7 (10EA/CA)

## (undated) DEVICE — LACTATED RINGERS INJ 1000 ML - (14EA/CA 60CA/PF)

## (undated) DEVICE — SUTURE 1 CHROMIC CTX ETHICON - (36PK/BX)

## (undated) DEVICE — SET LEADWIRE 5 LEAD BEDSIDE DISPOSABLE ECG (1SET OF 5/EA)

## (undated) DEVICE — SLEEVE VASO DVT COMPRESSION CALF MED - (10PR/CA)

## (undated) DEVICE — GLOVE BIOGEL SZ 8 SURGICAL PF LTX - (50PR/BX 4BX/CA)

## (undated) DEVICE — SET EXTENSION WITH 2 PORTS (48EA/CA) ***PART #2C8610 IS A SUBSTITUTE*****

## (undated) DEVICE — SWAB ANAEROBIC SPEC.COLLECTOR - (25/PK 4PK/CA 100EA/CA)

## (undated) DEVICE — TAPE CLOTH MEDIPORE 6 INCH - (12RL/CA)

## (undated) DEVICE — SUTURE 0 VICRYL PLUS CT 36 (36PK/BX)"

## (undated) DEVICE — PLUMEPEN ULTRA 3/8 IN X 10 FT HOSE (20EA/CA)

## (undated) DEVICE — GOWN WARMING STANDARD FLEX - (30/CA)

## (undated) DEVICE — CANNULA O2 COMFORT SOFT EAR ADULT 7 FT TUBING (50/CA)